# Patient Record
Sex: FEMALE | Race: WHITE | Employment: FULL TIME
[De-identification: names, ages, dates, MRNs, and addresses within clinical notes are randomized per-mention and may not be internally consistent; named-entity substitution may affect disease eponyms.]

---

## 2017-01-12 ENCOUNTER — TELEPHONE (OUTPATIENT)
Facility: CLINIC | Age: 47
End: 2017-01-12

## 2017-01-12 RX ORDER — AZITHROMYCIN 250 MG/1
TABLET, FILM COATED ORAL
Qty: 1 PACKET | Refills: 0 | Status: SHIPPED | OUTPATIENT
Start: 2017-01-12 | End: 2017-01-22

## 2017-02-10 DIAGNOSIS — F90.9 ATTENTION DEFICIT HYPERACTIVITY DISORDER (ADHD), UNSPECIFIED ADHD TYPE: Primary | ICD-10-CM

## 2017-02-10 DIAGNOSIS — M79.7 FIBROMYALGIA: ICD-10-CM

## 2017-02-10 RX ORDER — CYCLOBENZAPRINE HCL 5 MG
5 TABLET ORAL EVERY 8 HOURS PRN
Qty: 30 TABLET | Refills: 0 | Status: SHIPPED | OUTPATIENT
Start: 2017-02-10 | End: 2017-05-18 | Stop reason: SDUPTHER

## 2017-02-10 RX ORDER — DEXTROAMPHETAMINE SACCHARATE, AMPHETAMINE ASPARTATE, DEXTROAMPHETAMINE SULFATE AND AMPHETAMINE SULFATE 3.75; 3.75; 3.75; 3.75 MG/1; MG/1; MG/1; MG/1
15 TABLET ORAL 2 TIMES DAILY
Qty: 60 TABLET | Refills: 0 | Status: SHIPPED | OUTPATIENT
Start: 2017-02-10 | End: 2017-05-18 | Stop reason: SDUPTHER

## 2017-02-14 ENCOUNTER — OFFICE VISIT (OUTPATIENT)
Facility: CLINIC | Age: 47
End: 2017-02-14

## 2017-02-14 VITALS
DIASTOLIC BLOOD PRESSURE: 78 MMHG | HEART RATE: 80 BPM | OXYGEN SATURATION: 98 % | SYSTOLIC BLOOD PRESSURE: 120 MMHG | RESPIRATION RATE: 16 BRPM

## 2017-02-14 DIAGNOSIS — R42 LIGHT HEADED: Primary | ICD-10-CM

## 2017-02-14 DIAGNOSIS — M79.7 FIBROMYALGIA: ICD-10-CM

## 2017-02-14 DIAGNOSIS — E55.9 VITAMIN D DEFICIENCY: ICD-10-CM

## 2017-02-14 DIAGNOSIS — E78.00 PURE HYPERCHOLESTEROLEMIA: ICD-10-CM

## 2017-02-14 PROCEDURE — G8427 DOCREV CUR MEDS BY ELIG CLIN: HCPCS | Performed by: NURSE PRACTITIONER

## 2017-02-14 PROCEDURE — G8419 CALC BMI OUT NRM PARAM NOF/U: HCPCS | Performed by: NURSE PRACTITIONER

## 2017-02-14 PROCEDURE — 1036F TOBACCO NON-USER: CPT | Performed by: NURSE PRACTITIONER

## 2017-02-14 PROCEDURE — 99213 OFFICE O/P EST LOW 20 MIN: CPT | Performed by: NURSE PRACTITIONER

## 2017-02-14 PROCEDURE — G8484 FLU IMMUNIZE NO ADMIN: HCPCS | Performed by: NURSE PRACTITIONER

## 2017-02-14 RX ORDER — PREDNISONE 10 MG/1
TABLET ORAL
Qty: 12 TABLET | Refills: 0 | Status: SHIPPED | OUTPATIENT
Start: 2017-02-14 | End: 2017-08-22 | Stop reason: ALTCHOICE

## 2017-02-14 RX ORDER — SERTRALINE HYDROCHLORIDE 100 MG/1
TABLET, FILM COATED ORAL
Qty: 90 TABLET | Refills: 3 | Status: SHIPPED | OUTPATIENT
Start: 2017-02-14 | End: 2017-08-22

## 2017-02-15 ASSESSMENT — ENCOUNTER SYMPTOMS
ALLERGIC/IMMUNOLOGIC NEGATIVE: 1
GASTROINTESTINAL NEGATIVE: 1
EYES NEGATIVE: 1
RESPIRATORY NEGATIVE: 1

## 2017-02-18 ENCOUNTER — HOSPITAL ENCOUNTER (OUTPATIENT)
Age: 47
Discharge: HOME | End: 2017-02-18
Payer: COMMERCIAL

## 2017-02-18 DIAGNOSIS — R42 LIGHT HEADED: ICD-10-CM

## 2017-02-18 DIAGNOSIS — E55.9 VITAMIN D DEFICIENCY: ICD-10-CM

## 2017-02-18 DIAGNOSIS — E78.00 PURE HYPERCHOLESTEROLEMIA: ICD-10-CM

## 2017-02-18 LAB
ALBUMIN SERPL-MCNC: 4.4 G/DL (ref 3.5–5.2)
ALBUMIN/GLOBULIN RATIO: ABNORMAL (ref 1–2.5)
ALP BLD-CCNC: 51 U/L (ref 35–104)
ALT SERPL-CCNC: 10 U/L (ref 5–33)
ANION GAP SERPL CALCULATED.3IONS-SCNC: 12 MMOL/L (ref 9–17)
AST SERPL-CCNC: 16 U/L
BILIRUB SERPL-MCNC: 0.39 MG/DL (ref 0.3–1.2)
BUN BLDV-MCNC: 10 MG/DL (ref 6–20)
BUN/CREAT BLD: ABNORMAL (ref 9–20)
CALCIUM SERPL-MCNC: 9.2 MG/DL (ref 8.6–10.4)
CHLORIDE BLD-SCNC: 105 MMOL/L (ref 98–107)
CHOLESTEROL/HDL RATIO: 2.6
CHOLESTEROL: 182 MG/DL
CO2: 27 MMOL/L (ref 20–31)
CREAT SERPL-MCNC: 0.57 MG/DL (ref 0.5–0.9)
GFR AFRICAN AMERICAN: >60 ML/MIN
GFR NON-AFRICAN AMERICAN: >60 ML/MIN
GFR SERPL CREATININE-BSD FRML MDRD: ABNORMAL ML/MIN/{1.73_M2}
GFR SERPL CREATININE-BSD FRML MDRD: ABNORMAL ML/MIN/{1.73_M2}
GLUCOSE BLD-MCNC: 108 MG/DL (ref 70–99)
HCT VFR BLD CALC: 31.6 % (ref 36–46)
HDLC SERPL-MCNC: 70 MG/DL
HEMOGLOBIN: 9.5 G/DL (ref 12–16)
IRON: 21 UG/DL (ref 37–145)
LDL CHOLESTEROL: 99 MG/DL (ref 0–130)
MCH RBC QN AUTO: 21.2 PG (ref 26–34)
MCHC RBC AUTO-ENTMCNC: 30.2 G/DL (ref 31–37)
MCV RBC AUTO: 70.3 FL (ref 80–100)
PDW BLD-RTO: 17.6 % (ref 11.5–14.9)
PLATELET # BLD: 298 K/UL (ref 150–450)
PMV BLD AUTO: 8.4 FL (ref 6–12)
POTASSIUM SERPL-SCNC: 4.5 MMOL/L (ref 3.7–5.3)
RBC # BLD: 4.5 M/UL (ref 4–5.2)
SODIUM BLD-SCNC: 144 MMOL/L (ref 135–144)
THYROXINE, FREE: 1.19 NG/DL (ref 0.93–1.7)
TOTAL PROTEIN: 7.5 G/DL (ref 6.4–8.3)
TRIGL SERPL-MCNC: 66 MG/DL
TSH SERPL DL<=0.05 MIU/L-ACNC: 1.03 MIU/L (ref 0.3–5)
VITAMIN B-12: 539 PG/ML (ref 211–946)
VITAMIN D 25-HYDROXY: 34.4 NG/ML (ref 30–100)
VLDLC SERPL CALC-MCNC: NORMAL MG/DL (ref 1–30)
WBC # BLD: 4.5 K/UL (ref 3.5–11)

## 2017-02-18 PROCEDURE — 80053 COMPREHEN METABOLIC PANEL: CPT

## 2017-02-18 PROCEDURE — 82306 VITAMIN D 25 HYDROXY: CPT

## 2017-02-18 PROCEDURE — 82607 VITAMIN B-12: CPT

## 2017-02-18 PROCEDURE — 84443 ASSAY THYROID STIM HORMONE: CPT

## 2017-02-18 PROCEDURE — 80061 LIPID PANEL: CPT

## 2017-02-18 PROCEDURE — 84439 ASSAY OF FREE THYROXINE: CPT

## 2017-02-18 PROCEDURE — 83540 ASSAY OF IRON: CPT

## 2017-02-18 PROCEDURE — 85027 COMPLETE CBC AUTOMATED: CPT

## 2017-02-18 PROCEDURE — 36415 COLL VENOUS BLD VENIPUNCTURE: CPT

## 2017-12-26 PROBLEM — M25.551 RIGHT HIP PAIN: Status: ACTIVE | Noted: 2017-12-26

## 2017-12-26 PROBLEM — F33.41 RECURRENT MAJOR DEPRESSIVE DISORDER, IN PARTIAL REMISSION (HCC): Status: ACTIVE | Noted: 2017-12-26

## 2018-01-02 PROBLEM — M19.90 OSTEOARTHRITIS: Status: ACTIVE | Noted: 2018-01-02

## 2018-01-25 ENCOUNTER — HOSPITAL ENCOUNTER (OUTPATIENT)
Dept: WOMENS IMAGING | Age: 48
Discharge: HOME | End: 2018-01-25
Payer: COMMERCIAL

## 2018-01-25 DIAGNOSIS — Z12.39 SCREENING FOR BREAST CANCER: ICD-10-CM

## 2018-01-25 PROCEDURE — 77063 BREAST TOMOSYNTHESIS BI: CPT

## 2018-03-15 ENCOUNTER — HOSPITAL ENCOUNTER (OUTPATIENT)
Age: 48
Discharge: HOME | End: 2018-03-15
Payer: COMMERCIAL

## 2018-03-15 DIAGNOSIS — D50.9 IRON DEFICIENCY ANEMIA, UNSPECIFIED IRON DEFICIENCY ANEMIA TYPE: ICD-10-CM

## 2018-03-15 DIAGNOSIS — E78.00 PURE HYPERCHOLESTEROLEMIA: ICD-10-CM

## 2018-03-15 DIAGNOSIS — Z98.84 STATUS POST GASTRIC BYPASS FOR OBESITY: ICD-10-CM

## 2018-03-15 DIAGNOSIS — E55.9 VITAMIN D DEFICIENCY: ICD-10-CM

## 2018-03-15 LAB
ALBUMIN SERPL-MCNC: 4.3 G/DL (ref 3.5–5.2)
ALBUMIN/GLOBULIN RATIO: NORMAL (ref 1–2.5)
ALP BLD-CCNC: 63 U/L (ref 35–104)
ALT SERPL-CCNC: 13 U/L (ref 5–33)
ANION GAP SERPL CALCULATED.3IONS-SCNC: 11 MMOL/L (ref 9–17)
AST SERPL-CCNC: 20 U/L
BILIRUB SERPL-MCNC: 0.63 MG/DL (ref 0.3–1.2)
BUN BLDV-MCNC: 11 MG/DL (ref 6–20)
BUN/CREAT BLD: NORMAL (ref 9–20)
CALCIUM SERPL-MCNC: 9.4 MG/DL (ref 8.6–10.4)
CHLORIDE BLD-SCNC: 100 MMOL/L (ref 98–107)
CHOLESTEROL/HDL RATIO: 2.7
CHOLESTEROL: 196 MG/DL
CO2: 28 MMOL/L (ref 20–31)
CREAT SERPL-MCNC: 0.64 MG/DL (ref 0.5–0.9)
GFR AFRICAN AMERICAN: >60 ML/MIN
GFR NON-AFRICAN AMERICAN: >60 ML/MIN
GFR SERPL CREATININE-BSD FRML MDRD: NORMAL ML/MIN/{1.73_M2}
GFR SERPL CREATININE-BSD FRML MDRD: NORMAL ML/MIN/{1.73_M2}
GLUCOSE BLD-MCNC: 96 MG/DL (ref 70–99)
HCT VFR BLD CALC: 38.6 % (ref 36–46)
HDLC SERPL-MCNC: 73 MG/DL
HEMOGLOBIN: 12.6 G/DL (ref 12–16)
IRON: 128 UG/DL (ref 37–145)
LDL CHOLESTEROL: 99 MG/DL (ref 0–130)
MCH RBC QN AUTO: 27.9 PG (ref 26–34)
MCHC RBC AUTO-ENTMCNC: 32.7 G/DL (ref 31–37)
MCV RBC AUTO: 85.5 FL (ref 80–100)
NRBC AUTOMATED: ABNORMAL PER 100 WBC
PDW BLD-RTO: 15.6 % (ref 11.5–14.9)
PLATELET # BLD: 243 K/UL (ref 150–450)
PMV BLD AUTO: 8.9 FL (ref 6–12)
POTASSIUM SERPL-SCNC: 4 MMOL/L (ref 3.7–5.3)
RBC # BLD: 4.51 M/UL (ref 4–5.2)
SODIUM BLD-SCNC: 139 MMOL/L (ref 135–144)
THYROXINE, FREE: 1.35 NG/DL (ref 0.93–1.7)
TOTAL PROTEIN: 7.5 G/DL (ref 6.4–8.3)
TRIGL SERPL-MCNC: 122 MG/DL
TSH SERPL DL<=0.05 MIU/L-ACNC: 4.74 MIU/L (ref 0.3–5)
VITAMIN B-12: 541 PG/ML (ref 232–1245)
VITAMIN D 25-HYDROXY: 35.7 NG/ML (ref 30–100)
VLDLC SERPL CALC-MCNC: NORMAL MG/DL (ref 1–30)
WBC # BLD: 5.1 K/UL (ref 3.5–11)

## 2018-03-15 PROCEDURE — 36415 COLL VENOUS BLD VENIPUNCTURE: CPT

## 2018-03-15 PROCEDURE — 82306 VITAMIN D 25 HYDROXY: CPT

## 2018-03-15 PROCEDURE — 82607 VITAMIN B-12: CPT

## 2018-03-15 PROCEDURE — 83540 ASSAY OF IRON: CPT

## 2018-03-15 PROCEDURE — 84439 ASSAY OF FREE THYROXINE: CPT

## 2018-03-15 PROCEDURE — 80061 LIPID PANEL: CPT

## 2018-03-15 PROCEDURE — 85027 COMPLETE CBC AUTOMATED: CPT

## 2018-03-15 PROCEDURE — 84443 ASSAY THYROID STIM HORMONE: CPT

## 2018-03-15 PROCEDURE — 80053 COMPREHEN METABOLIC PANEL: CPT

## 2018-03-28 ENCOUNTER — HOSPITAL ENCOUNTER (OUTPATIENT)
Dept: CT IMAGING | Age: 48
Discharge: HOME | End: 2018-03-30
Payer: COMMERCIAL

## 2018-03-28 DIAGNOSIS — R10.32 ABDOMINAL PAIN, LEFT LOWER QUADRANT: ICD-10-CM

## 2018-03-28 DIAGNOSIS — R10.32 LLQ ABDOMINAL PAIN: ICD-10-CM

## 2018-03-28 PROCEDURE — 74177 CT ABD & PELVIS W/CONTRAST: CPT

## 2018-03-28 PROCEDURE — 2580000003 HC RX 258: Performed by: NURSE PRACTITIONER

## 2018-03-28 PROCEDURE — 6360000004 HC RX CONTRAST MEDICATION: Performed by: NURSE PRACTITIONER

## 2018-03-28 RX ORDER — 0.9 % SODIUM CHLORIDE 0.9 %
100 INTRAVENOUS SOLUTION INTRAVENOUS ONCE
Status: COMPLETED | OUTPATIENT
Start: 2018-03-28 | End: 2018-03-28

## 2018-03-28 RX ORDER — SODIUM CHLORIDE 0.9 % (FLUSH) 0.9 %
10 SYRINGE (ML) INJECTION PRN
Status: DISCONTINUED | OUTPATIENT
Start: 2018-03-28 | End: 2018-03-31 | Stop reason: HOSPADM

## 2018-03-28 RX ADMIN — IOPAMIDOL 100 ML: 755 INJECTION, SOLUTION INTRAVENOUS at 07:30

## 2018-03-28 RX ADMIN — SODIUM CHLORIDE 100 ML: 9 INJECTION, SOLUTION INTRAVENOUS at 07:30

## 2018-03-28 RX ADMIN — Medication 10 ML: at 07:30

## 2018-05-31 ENCOUNTER — HOSPITAL ENCOUNTER (OUTPATIENT)
Dept: CT IMAGING | Age: 48
Discharge: HOME | End: 2018-06-02
Payer: COMMERCIAL

## 2018-05-31 DIAGNOSIS — J32.0 CHRONIC MAXILLARY SINUSITIS: ICD-10-CM

## 2018-05-31 PROCEDURE — 70486 CT MAXILLOFACIAL W/O DYE: CPT

## 2018-11-02 PROBLEM — R07.2 PRECORDIAL PAIN: Status: ACTIVE | Noted: 2018-11-02

## 2018-11-02 PROBLEM — F51.04 PSYCHOPHYSIOLOGICAL INSOMNIA: Status: ACTIVE | Noted: 2018-11-02

## 2018-11-02 PROBLEM — N39.41 URGE INCONTINENCE OF URINE: Status: ACTIVE | Noted: 2018-11-02

## 2018-11-02 PROBLEM — N95.1 PERIMENOPAUSAL SYMPTOMS: Status: ACTIVE | Noted: 2018-11-02

## 2018-11-16 PROBLEM — Z78.9 ALCOHOL USE: Status: ACTIVE | Noted: 2018-11-16

## 2018-11-19 ENCOUNTER — OFFICE VISIT (OUTPATIENT)
Dept: BEHAVIORAL/MENTAL HEALTH CLINIC | Age: 48
End: 2018-11-19
Payer: COMMERCIAL

## 2018-11-19 DIAGNOSIS — F33.0 MAJOR DEPRESSIVE DISORDER, RECURRENT EPISODE, MILD (HCC): Primary | ICD-10-CM

## 2018-11-19 PROCEDURE — 90791 PSYCH DIAGNOSTIC EVALUATION: CPT | Performed by: SOCIAL WORKER

## 2019-01-02 PROBLEM — R56.9 SEIZURE (HCC): Status: ACTIVE | Noted: 2019-01-02

## 2019-01-02 PROBLEM — M54.31 SCIATICA OF RIGHT SIDE: Status: ACTIVE | Noted: 2019-01-02

## 2019-01-21 ENCOUNTER — HOSPITAL ENCOUNTER (OUTPATIENT)
Age: 49
Discharge: HOME | End: 2019-01-21
Payer: COMMERCIAL

## 2019-01-21 DIAGNOSIS — R53.83 OTHER FATIGUE: ICD-10-CM

## 2019-01-21 DIAGNOSIS — N95.1 PERIMENOPAUSAL SYMPTOMS: ICD-10-CM

## 2019-01-21 LAB
ANION GAP SERPL CALCULATED.3IONS-SCNC: 10 MMOL/L (ref 9–17)
BUN BLDV-MCNC: 13 MG/DL (ref 6–20)
BUN/CREAT BLD: NORMAL (ref 9–20)
CALCIUM SERPL-MCNC: 8.9 MG/DL (ref 8.6–10.4)
CHLORIDE BLD-SCNC: 105 MMOL/L (ref 98–107)
CO2: 26 MMOL/L (ref 20–31)
CREAT SERPL-MCNC: 0.5 MG/DL (ref 0.5–0.9)
ESTIMATED AVERAGE GLUCOSE: 103 MG/DL
ESTRADIOL LEVEL: 98 PG/ML (ref 27–314)
FOLLICLE STIMULATING HORMONE: 21.5 U/L (ref 1.7–21.5)
GFR AFRICAN AMERICAN: >60 ML/MIN
GFR NON-AFRICAN AMERICAN: >60 ML/MIN
GFR SERPL CREATININE-BSD FRML MDRD: NORMAL ML/MIN/{1.73_M2}
GFR SERPL CREATININE-BSD FRML MDRD: NORMAL ML/MIN/{1.73_M2}
GLUCOSE BLD-MCNC: 97 MG/DL (ref 70–99)
HBA1C MFR BLD: 5.2 % (ref 4–6)
HCT VFR BLD CALC: 37.2 % (ref 36–46)
HEMOGLOBIN: 12.4 G/DL (ref 12–16)
IRON: 31 UG/DL (ref 37–145)
LH: 21 U/L (ref 1–95.6)
MCH RBC QN AUTO: 29 PG (ref 26–34)
MCHC RBC AUTO-ENTMCNC: 33.3 G/DL (ref 31–37)
MCV RBC AUTO: 86.8 FL (ref 80–100)
MYOGLOBIN: <21 NG/ML (ref 25–58)
NRBC AUTOMATED: NORMAL PER 100 WBC
PDW BLD-RTO: 14.5 % (ref 11.5–14.9)
PLATELET # BLD: 241 K/UL (ref 150–450)
PMV BLD AUTO: 8.3 FL (ref 6–12)
POTASSIUM SERPL-SCNC: 4.4 MMOL/L (ref 3.7–5.3)
PROGESTERONE LEVEL: 1.72 NG/ML
RBC # BLD: 4.28 M/UL (ref 4–5.2)
SEX HORMONE BINDING GLOBULIN: 106 NMOL/L (ref 30–135)
SODIUM BLD-SCNC: 141 MMOL/L (ref 135–144)
TESTOSTERONE FREE-NONMALE: 1.6 PG/ML (ref 1.1–5.8)
TESTOSTERONE TOTAL: 20 NG/DL (ref 20–70)
THYROXINE, FREE: 0.94 NG/DL (ref 0.93–1.7)
TOTAL CK: 99 U/L (ref 26–192)
TSH SERPL DL<=0.05 MIU/L-ACNC: 1.96 MIU/L (ref 0.3–5)
VITAMIN B-12: 313 PG/ML (ref 232–1245)
VITAMIN D 25-HYDROXY: 26.8 NG/ML (ref 30–100)
WBC # BLD: 5.2 K/UL (ref 3.5–11)

## 2019-01-21 PROCEDURE — 84439 ASSAY OF FREE THYROXINE: CPT

## 2019-01-21 PROCEDURE — 83036 HEMOGLOBIN GLYCOSYLATED A1C: CPT

## 2019-01-21 PROCEDURE — 83001 ASSAY OF GONADOTROPIN (FSH): CPT

## 2019-01-21 PROCEDURE — 82306 VITAMIN D 25 HYDROXY: CPT

## 2019-01-21 PROCEDURE — 82607 VITAMIN B-12: CPT

## 2019-01-21 PROCEDURE — 84144 ASSAY OF PROGESTERONE: CPT

## 2019-01-21 PROCEDURE — 36415 COLL VENOUS BLD VENIPUNCTURE: CPT

## 2019-01-21 PROCEDURE — 83874 ASSAY OF MYOGLOBIN: CPT

## 2019-01-21 PROCEDURE — 84403 ASSAY OF TOTAL TESTOSTERONE: CPT

## 2019-01-21 PROCEDURE — 80048 BASIC METABOLIC PNL TOTAL CA: CPT

## 2019-01-21 PROCEDURE — 82670 ASSAY OF TOTAL ESTRADIOL: CPT

## 2019-01-21 PROCEDURE — 83002 ASSAY OF GONADOTROPIN (LH): CPT

## 2019-01-21 PROCEDURE — 84443 ASSAY THYROID STIM HORMONE: CPT

## 2019-01-21 PROCEDURE — 84270 ASSAY OF SEX HORMONE GLOBUL: CPT

## 2019-01-21 PROCEDURE — 85027 COMPLETE CBC AUTOMATED: CPT

## 2019-01-21 PROCEDURE — 83540 ASSAY OF IRON: CPT

## 2019-01-21 PROCEDURE — 82550 ASSAY OF CK (CPK): CPT

## 2019-02-11 ENCOUNTER — HOSPITAL ENCOUNTER (OUTPATIENT)
Dept: NON INVASIVE DIAGNOSTICS | Age: 49
Discharge: HOME | End: 2019-02-11
Payer: COMMERCIAL

## 2019-02-11 ENCOUNTER — HOSPITAL ENCOUNTER (OUTPATIENT)
Dept: NUCLEAR MEDICINE | Age: 49
Discharge: HOME | End: 2019-02-13
Payer: COMMERCIAL

## 2019-02-11 VITALS — HEIGHT: 67 IN | BODY MASS INDEX: 36.1 KG/M2 | WEIGHT: 230 LBS

## 2019-02-11 DIAGNOSIS — R07.9 CHEST PAIN, UNSPECIFIED TYPE: ICD-10-CM

## 2019-02-11 DIAGNOSIS — R07.2 PRECORDIAL PAIN: ICD-10-CM

## 2019-02-11 LAB
LV EF: 53 %
LVEF MODALITY: NORMAL

## 2019-02-11 PROCEDURE — 78452 HT MUSCLE IMAGE SPECT MULT: CPT

## 2019-02-11 PROCEDURE — 2580000003 HC RX 258: Performed by: NURSE PRACTITIONER

## 2019-02-11 PROCEDURE — 93017 CV STRESS TEST TRACING ONLY: CPT

## 2019-02-11 PROCEDURE — 6360000002 HC RX W HCPCS: Performed by: NURSE PRACTITIONER

## 2019-02-11 PROCEDURE — 3430000000 HC RX DIAGNOSTIC RADIOPHARMACEUTICAL: Performed by: NURSE PRACTITIONER

## 2019-02-11 PROCEDURE — A9500 TC99M SESTAMIBI: HCPCS | Performed by: NURSE PRACTITIONER

## 2019-02-11 RX ORDER — NITROGLYCERIN 0.4 MG/1
0.4 TABLET SUBLINGUAL EVERY 5 MIN PRN
Status: ACTIVE | OUTPATIENT
Start: 2019-02-11 | End: 2019-02-12

## 2019-02-11 RX ORDER — SODIUM CHLORIDE 0.9 % (FLUSH) 0.9 %
10 SYRINGE (ML) INJECTION PRN
Status: DISCONTINUED | OUTPATIENT
Start: 2019-02-11 | End: 2019-02-14 | Stop reason: HOSPADM

## 2019-02-11 RX ORDER — METOPROLOL TARTRATE 5 MG/5ML
2.5 INJECTION INTRAVENOUS PRN
Status: ACTIVE | OUTPATIENT
Start: 2019-02-11 | End: 2019-02-12

## 2019-02-11 RX ORDER — 0.9 % SODIUM CHLORIDE 0.9 %
250 INTRAVENOUS SOLUTION INTRAVENOUS ONCE
Status: DISCONTINUED | OUTPATIENT
Start: 2019-02-11 | End: 2019-02-14 | Stop reason: HOSPADM

## 2019-02-11 RX ORDER — SODIUM CHLORIDE 0.9 % (FLUSH) 0.9 %
10 SYRINGE (ML) INJECTION PRN
Status: ACTIVE | OUTPATIENT
Start: 2019-02-11 | End: 2019-02-12

## 2019-02-11 RX ORDER — AMINOPHYLLINE DIHYDRATE 25 MG/ML
100 INJECTION, SOLUTION INTRAVENOUS
Status: ACTIVE | OUTPATIENT
Start: 2019-02-11 | End: 2019-02-11

## 2019-02-11 RX ADMIN — Medication 10 ML: at 08:52

## 2019-02-11 RX ADMIN — REGADENOSON 0.4 MG: 0.08 INJECTION, SOLUTION INTRAVENOUS at 09:22

## 2019-02-11 RX ADMIN — TETRAKIS(2-METHOXYISOBUTYLISOCYANIDE)COPPER(I) TETRAFLUOROBORATE 35.3 MILLICURIE: 1 INJECTION, POWDER, LYOPHILIZED, FOR SOLUTION INTRAVENOUS at 09:24

## 2019-02-11 RX ADMIN — Medication 10 ML: at 06:56

## 2019-02-11 RX ADMIN — TETRAKIS(2-METHOXYISOBUTYLISOCYANIDE)COPPER(I) TETRAFLUOROBORATE 11.5 MILLICURIE: 1 INJECTION, POWDER, LYOPHILIZED, FOR SOLUTION INTRAVENOUS at 06:55

## 2019-05-01 ENCOUNTER — HOSPITAL ENCOUNTER (OUTPATIENT)
Age: 49
Discharge: HOME | End: 2019-05-01
Payer: COMMERCIAL

## 2019-05-01 DIAGNOSIS — D50.9 IRON DEFICIENCY ANEMIA, UNSPECIFIED IRON DEFICIENCY ANEMIA TYPE: ICD-10-CM

## 2019-05-01 DIAGNOSIS — E55.9 VITAMIN D DEFICIENCY: ICD-10-CM

## 2019-05-01 LAB
IRON: 84 UG/DL (ref 37–145)
VITAMIN D 25-HYDROXY: 35.8 NG/ML (ref 30–100)

## 2019-05-01 PROCEDURE — 83540 ASSAY OF IRON: CPT

## 2019-05-01 PROCEDURE — 82306 VITAMIN D 25 HYDROXY: CPT

## 2019-05-01 PROCEDURE — 36415 COLL VENOUS BLD VENIPUNCTURE: CPT

## 2019-05-18 ENCOUNTER — HOSPITAL ENCOUNTER (OUTPATIENT)
Age: 49
Discharge: HOME | End: 2019-05-18
Payer: COMMERCIAL

## 2019-05-18 DIAGNOSIS — T14.8XXA BRUISING: ICD-10-CM

## 2019-05-18 DIAGNOSIS — Z80.8 FAMILY HISTORY OF THYROID CANCER: ICD-10-CM

## 2019-05-18 DIAGNOSIS — R56.9 SEIZURE (HCC): ICD-10-CM

## 2019-05-18 LAB
HCT VFR BLD CALC: 38.2 % (ref 36–46)
HEMOGLOBIN: 12.6 G/DL (ref 12–16)
KEPPRA: 32 UG/ML
MCH RBC QN AUTO: 28.7 PG (ref 26–34)
MCHC RBC AUTO-ENTMCNC: 32.9 G/DL (ref 31–37)
MCV RBC AUTO: 87.2 FL (ref 80–100)
NRBC AUTOMATED: ABNORMAL PER 100 WBC
PDW BLD-RTO: 15.9 % (ref 11.5–14.9)
PLATELET # BLD: 271 K/UL (ref 150–450)
PMV BLD AUTO: 8.3 FL (ref 6–12)
RBC # BLD: 4.38 M/UL (ref 4–5.2)
THYROXINE, FREE: 1.46 NG/DL (ref 0.93–1.7)
TSH SERPL DL<=0.05 MIU/L-ACNC: 2.13 MIU/L (ref 0.3–5)
WBC # BLD: 4.5 K/UL (ref 3.5–11)

## 2019-05-18 PROCEDURE — 84439 ASSAY OF FREE THYROXINE: CPT

## 2019-05-18 PROCEDURE — 36415 COLL VENOUS BLD VENIPUNCTURE: CPT

## 2019-05-18 PROCEDURE — 85027 COMPLETE CBC AUTOMATED: CPT

## 2019-05-18 PROCEDURE — 80177 DRUG SCRN QUAN LEVETIRACETAM: CPT

## 2019-05-18 PROCEDURE — 84597 ASSAY OF VITAMIN K: CPT

## 2019-05-18 PROCEDURE — 84443 ASSAY THYROID STIM HORMONE: CPT

## 2019-05-21 LAB — VITAMIN K: 1.28 NMOL/L (ref 0.22–4.88)

## 2019-11-05 ENCOUNTER — HOSPITAL ENCOUNTER (OUTPATIENT)
Age: 49
Discharge: HOME | End: 2019-11-05
Payer: COMMERCIAL

## 2019-11-05 LAB
C DIFFICILE TOXINS, PCR: NORMAL
C-REACTIVE PROTEIN: 1.2 MG/L (ref 0–5)
CAMPYLOBACTER PCR: NORMAL
E COLI ENTEROTOXIGENIC PCR: NORMAL
IGA: 333 MG/DL (ref 70–400)
PLESIOMONAS SHIGELLOIDES PCR: NORMAL
SALMONELLA PCR: NORMAL
SHIGATOXIN GENE PCR: NORMAL
SHIGELLA SP PCR: NORMAL
SPECIMEN DESCRIPTION: NORMAL
SPECIMEN DESCRIPTION: NORMAL
VIBRIO PCR: NORMAL
YERSINIA ENTEROCOLITICA PCR: NORMAL

## 2019-11-05 PROCEDURE — 83516 IMMUNOASSAY NONANTIBODY: CPT

## 2019-11-05 PROCEDURE — 87329 GIARDIA AG IA: CPT

## 2019-11-05 PROCEDURE — 82784 ASSAY IGA/IGD/IGG/IGM EACH: CPT

## 2019-11-05 PROCEDURE — 83993 ASSAY FOR CALPROTECTIN FECAL: CPT

## 2019-11-05 PROCEDURE — 87493 C DIFF AMPLIFIED PROBE: CPT

## 2019-11-05 PROCEDURE — 36415 COLL VENOUS BLD VENIPUNCTURE: CPT

## 2019-11-05 PROCEDURE — 87506 IADNA-DNA/RNA PROBE TQ 6-11: CPT

## 2019-11-05 PROCEDURE — 86140 C-REACTIVE PROTEIN: CPT

## 2019-11-05 PROCEDURE — 87328 CRYPTOSPORIDIUM AG IA: CPT

## 2019-11-06 LAB
DIRECT EXAM: NORMAL
DIRECT EXAM: NORMAL
GLIADIN DEAMINIDATED PEPTIDE AB IGG: <0.4 U/ML
Lab: NORMAL
SPECIMEN DESCRIPTION: NORMAL
TISSUE TRANSGLUTAMINASE IGA: 0.5 U/ML

## 2019-11-08 LAB — CALPROTECTIN, FECAL: 79 UG/G

## 2020-05-20 ENCOUNTER — HOSPITAL ENCOUNTER (OUTPATIENT)
Dept: WOMENS IMAGING | Age: 50
Discharge: HOME | End: 2020-05-22
Payer: COMMERCIAL

## 2020-05-20 ENCOUNTER — HOSPITAL ENCOUNTER (OUTPATIENT)
Age: 50
Discharge: HOME | End: 2020-05-20
Payer: COMMERCIAL

## 2020-05-20 LAB
ALBUMIN SERPL-MCNC: 4.5 G/DL (ref 3.5–5.2)
ALBUMIN/GLOBULIN RATIO: ABNORMAL (ref 1–2.5)
ALP BLD-CCNC: 83 U/L (ref 35–104)
ALT SERPL-CCNC: 53 U/L (ref 5–33)
ANION GAP SERPL CALCULATED.3IONS-SCNC: 17 MMOL/L (ref 9–17)
AST SERPL-CCNC: 30 U/L
BILIRUB SERPL-MCNC: 0.67 MG/DL (ref 0.3–1.2)
BUN BLDV-MCNC: 12 MG/DL (ref 6–20)
BUN/CREAT BLD: ABNORMAL (ref 9–20)
CALCIUM SERPL-MCNC: 9.6 MG/DL (ref 8.6–10.4)
CHLORIDE BLD-SCNC: 99 MMOL/L (ref 98–107)
CHOLESTEROL/HDL RATIO: 2.2
CHOLESTEROL: 243 MG/DL
CO2: 24 MMOL/L (ref 20–31)
CREAT SERPL-MCNC: 0.65 MG/DL (ref 0.5–0.9)
ESTIMATED AVERAGE GLUCOSE: 100 MG/DL
GFR AFRICAN AMERICAN: >60 ML/MIN
GFR NON-AFRICAN AMERICAN: >60 ML/MIN
GFR SERPL CREATININE-BSD FRML MDRD: ABNORMAL ML/MIN/{1.73_M2}
GFR SERPL CREATININE-BSD FRML MDRD: ABNORMAL ML/MIN/{1.73_M2}
GLUCOSE BLD-MCNC: 102 MG/DL (ref 70–99)
HBA1C MFR BLD: 5.1 % (ref 4–6)
HCT VFR BLD CALC: 40.1 % (ref 36–46)
HDLC SERPL-MCNC: 111 MG/DL
HEMOGLOBIN: 13.2 G/DL (ref 12–16)
IRON: 95 UG/DL (ref 37–145)
LDL CHOLESTEROL: 109 MG/DL (ref 0–130)
MCH RBC QN AUTO: 29.7 PG (ref 26–34)
MCHC RBC AUTO-ENTMCNC: 33 G/DL (ref 31–37)
MCV RBC AUTO: 89.9 FL (ref 80–100)
NRBC AUTOMATED: ABNORMAL PER 100 WBC
PDW BLD-RTO: 16.7 % (ref 11.5–14.9)
PLATELET # BLD: 234 K/UL (ref 150–450)
PMV BLD AUTO: 8 FL (ref 6–12)
POTASSIUM SERPL-SCNC: 4.2 MMOL/L (ref 3.7–5.3)
RBC # BLD: 4.45 M/UL (ref 4–5.2)
SODIUM BLD-SCNC: 140 MMOL/L (ref 135–144)
TOTAL PROTEIN: 7.6 G/DL (ref 6.4–8.3)
TRIGL SERPL-MCNC: 114 MG/DL
VITAMIN B-12: 590 PG/ML (ref 232–1245)
VITAMIN D 25-HYDROXY: 37.7 NG/ML (ref 30–100)
VLDLC SERPL CALC-MCNC: ABNORMAL MG/DL (ref 1–30)
WBC # BLD: 4.2 K/UL (ref 3.5–11)

## 2020-05-20 PROCEDURE — 82607 VITAMIN B-12: CPT

## 2020-05-20 PROCEDURE — 36415 COLL VENOUS BLD VENIPUNCTURE: CPT

## 2020-05-20 PROCEDURE — 80053 COMPREHEN METABOLIC PANEL: CPT

## 2020-05-20 PROCEDURE — 82306 VITAMIN D 25 HYDROXY: CPT

## 2020-05-20 PROCEDURE — 83540 ASSAY OF IRON: CPT

## 2020-05-20 PROCEDURE — 85027 COMPLETE CBC AUTOMATED: CPT

## 2020-05-20 PROCEDURE — 80061 LIPID PANEL: CPT

## 2020-05-20 PROCEDURE — 77067 SCR MAMMO BI INCL CAD: CPT

## 2020-05-20 PROCEDURE — 83036 HEMOGLOBIN GLYCOSYLATED A1C: CPT

## 2021-04-22 ENCOUNTER — HOSPITAL ENCOUNTER (OUTPATIENT)
Age: 51
Discharge: HOME | End: 2021-04-22
Payer: COMMERCIAL

## 2021-04-22 DIAGNOSIS — E78.00 PURE HYPERCHOLESTEROLEMIA: ICD-10-CM

## 2021-04-22 DIAGNOSIS — E55.9 VITAMIN D DEFICIENCY: ICD-10-CM

## 2021-04-22 DIAGNOSIS — E53.8 VITAMIN B 12 DEFICIENCY: ICD-10-CM

## 2021-04-22 DIAGNOSIS — Z13.9 SCREENING FOR CONDITION: ICD-10-CM

## 2021-04-22 LAB
ALBUMIN SERPL-MCNC: 4.3 G/DL (ref 3.5–5.2)
ALBUMIN/GLOBULIN RATIO: ABNORMAL (ref 1–2.5)
ALP BLD-CCNC: 67 U/L (ref 35–104)
ALT SERPL-CCNC: 32 U/L (ref 5–33)
ANION GAP SERPL CALCULATED.3IONS-SCNC: 8 MMOL/L (ref 9–17)
AST SERPL-CCNC: 27 U/L
BILIRUB SERPL-MCNC: 0.2 MG/DL (ref 0.3–1.2)
BUN BLDV-MCNC: 11 MG/DL (ref 6–20)
BUN/CREAT BLD: ABNORMAL (ref 9–20)
CALCIUM SERPL-MCNC: 9.4 MG/DL (ref 8.6–10.4)
CHLORIDE BLD-SCNC: 108 MMOL/L (ref 98–107)
CHOLESTEROL/HDL RATIO: 3.8
CHOLESTEROL: 220 MG/DL
CO2: 30 MMOL/L (ref 20–31)
CREAT SERPL-MCNC: 0.67 MG/DL (ref 0.5–0.9)
ESTIMATED AVERAGE GLUCOSE: 117 MG/DL
GFR AFRICAN AMERICAN: >60 ML/MIN
GFR NON-AFRICAN AMERICAN: >60 ML/MIN
GFR SERPL CREATININE-BSD FRML MDRD: ABNORMAL ML/MIN/{1.73_M2}
GFR SERPL CREATININE-BSD FRML MDRD: ABNORMAL ML/MIN/{1.73_M2}
GLUCOSE BLD-MCNC: 122 MG/DL (ref 70–99)
HBA1C MFR BLD: 5.7 % (ref 4–6)
HCT VFR BLD CALC: 38 % (ref 36–46)
HDLC SERPL-MCNC: 58 MG/DL
HEMOGLOBIN: 12.3 G/DL (ref 12–16)
LDL CHOLESTEROL: 137 MG/DL (ref 0–130)
MCH RBC QN AUTO: 27 PG (ref 26–34)
MCHC RBC AUTO-ENTMCNC: 32.4 G/DL (ref 31–37)
MCV RBC AUTO: 83.3 FL (ref 80–100)
NRBC AUTOMATED: NORMAL PER 100 WBC
PDW BLD-RTO: 14 % (ref 11.5–14.9)
PLATELET # BLD: 266 K/UL (ref 150–450)
PMV BLD AUTO: 8.2 FL (ref 6–12)
POTASSIUM SERPL-SCNC: 4.4 MMOL/L (ref 3.7–5.3)
RBC # BLD: 4.56 M/UL (ref 4–5.2)
SODIUM BLD-SCNC: 146 MMOL/L (ref 135–144)
TOTAL PROTEIN: 7.3 G/DL (ref 6.4–8.3)
TRIGL SERPL-MCNC: 123 MG/DL
VITAMIN B-12: 405 PG/ML (ref 232–1245)
VITAMIN D 25-HYDROXY: 32.9 NG/ML (ref 30–100)
VLDLC SERPL CALC-MCNC: ABNORMAL MG/DL (ref 1–30)
WBC # BLD: 6.3 K/UL (ref 3.5–11)

## 2021-04-22 PROCEDURE — 83036 HEMOGLOBIN GLYCOSYLATED A1C: CPT

## 2021-04-22 PROCEDURE — 82306 VITAMIN D 25 HYDROXY: CPT

## 2021-04-22 PROCEDURE — 82607 VITAMIN B-12: CPT

## 2021-04-22 PROCEDURE — 85027 COMPLETE CBC AUTOMATED: CPT

## 2021-04-22 PROCEDURE — 80053 COMPREHEN METABOLIC PANEL: CPT

## 2021-04-22 PROCEDURE — 80061 LIPID PANEL: CPT

## 2021-04-22 PROCEDURE — 36415 COLL VENOUS BLD VENIPUNCTURE: CPT

## 2021-06-28 ENCOUNTER — HOSPITAL ENCOUNTER (OUTPATIENT)
Facility: CLINIC | Age: 51
Discharge: HOME | End: 2021-06-30
Payer: COMMERCIAL

## 2021-06-28 ENCOUNTER — HOSPITAL ENCOUNTER (OUTPATIENT)
Dept: GENERAL RADIOLOGY | Facility: CLINIC | Age: 51
Discharge: HOME | End: 2021-06-30
Payer: COMMERCIAL

## 2021-06-28 DIAGNOSIS — M54.2 CHRONIC NECK PAIN: ICD-10-CM

## 2021-06-28 DIAGNOSIS — G89.29 CHRONIC NECK PAIN: ICD-10-CM

## 2021-06-28 PROCEDURE — 72050 X-RAY EXAM NECK SPINE 4/5VWS: CPT

## 2021-09-27 ENCOUNTER — NURSE TRIAGE (OUTPATIENT)
Dept: OTHER | Facility: CLINIC | Age: 51
End: 2021-09-27

## 2021-09-27 NOTE — TELEPHONE ENCOUNTER
This is not a new problem. If she is not suicidal or thinking of hurting herself she does not need to be seen today. She could however do a virtual visit today. Go ahead and add her to the schedule that she wants to be seen to the virtual schedule.

## 2021-09-27 NOTE — TELEPHONE ENCOUNTER
Received call from Cj Valenzuela at [facility] with Red Flag Complaint. Brief description of triage: anxiety and depression    Triage indicates for patient to be seen today. Care advice provided, patient verbalizes understanding; denies any other questions or concerns; instructed to call back for any new or worsening symptoms. Writer provided warm transfer to Mony at [facility] for appointment scheduling. Attention Provider: Thank you for allowing me to participate in the care of your patient. The patient was connected to triage in response to information provided to the ECC/PSC. Please do not respond through this encounter as the response is not directed to a shared pool. Reason for Disposition   Sometimes has thoughts of suicide    Answer Assessment - Initial Assessment Questions  1. CONCERN: \"What happened that made you call today? \"      Missing work    2. DEPRESSION SYMPTOM SCREENING: \"How are you feeling overall? \" (e.g., decreased energy, increased sleeping or difficulty sleeping, difficulty concentrating, feelings of sadness, guilt, hopelessness, or worthlessness)      Anxious    3. RISK OF HARM - SUICIDAL IDEATION:  \"Do you ever have thoughts of hurting or killing yourself? \"  (e.g., yes, no, no but preoccupation with thoughts about death)    - INTENT:  \"Do you have thoughts of hurting or killing yourself right NOW? \" (e.g., yes, no, N/A)    - PLAN: \"Do you have a specific plan for how you would do this? \" (e.g., gun, knife, overdose, no plan, N/A)     No, no    4. RISK OF HARM - HOMICIDAL IDEATION:  \"Do you ever have thoughts of hurting or killing someone else? \"  (e.g., yes, no, no but preoccupation with thoughts about death)    - INTENT:  \"Do you have thoughts of hurting or killing someone right NOW? \" (e.g., yes, no, N/A)    - PLAN: \"Do you have a specific plan for how you would do this? \" (e.g., gun, knife, no plan, N/A)     No    5.  FUNCTIONAL IMPAIRMENT: \"How have things been going for you overall in your life? Have you had any more difficulties than usual doing your normal daily activities? \"  (e.g., better, same, worse; self-care, school, work, interactions)   states life not good, work,  yes    6. SUPPORT: \"Who is with you now? \" \"Who do you live with?\" \"Do you have family or friends nearby who you can talk to?\"   Son is home, lives with  and son, yes      7. THERAPIST: \"Do you have a counselor or therapist? Name? \"  No    8. STRESSORS: \"Has there been any new stress or recent changes in your life? \"    Change in work schedule    9. DRUG ABUSE/ALCOHOL: \"Do you drink alcohol or use any illegal drugs? \"    drinks alcohol    10. OTHER: \"Do you have any other health or medical symptoms right now? \" (e.g., fever)      Headache, nausea, diarrhea    11. PREGNANCY: \"Is there any chance you are pregnant? \" \"When was your last menstrual period? \"      No, no cycle    Protocols used: DEPRESSION-ADULT-OH

## 2021-10-23 ENCOUNTER — HOSPITAL ENCOUNTER (OUTPATIENT)
Age: 51
Discharge: HOME | End: 2021-10-23
Payer: COMMERCIAL

## 2021-10-23 PROCEDURE — 86225 DNA ANTIBODY NATIVE: CPT

## 2021-10-23 PROCEDURE — 86038 ANTINUCLEAR ANTIBODIES: CPT

## 2021-10-23 PROCEDURE — 36415 COLL VENOUS BLD VENIPUNCTURE: CPT

## 2021-10-25 LAB
ANTI DNA DOUBLE STRANDED: <0.5 IU/ML
ANTI-NUCLEAR ANTIBODY (ANA): NEGATIVE
ENA ANTIBODIES SCREEN: 0.2 U/ML

## 2021-12-05 ENCOUNTER — APPOINTMENT (OUTPATIENT)
Dept: GENERAL RADIOLOGY | Age: 51
End: 2021-12-05
Payer: COMMERCIAL

## 2021-12-05 ENCOUNTER — HOSPITAL ENCOUNTER (EMERGENCY)
Age: 51
Discharge: HOME | End: 2021-12-05
Attending: EMERGENCY MEDICINE
Payer: COMMERCIAL

## 2021-12-05 VITALS
SYSTOLIC BLOOD PRESSURE: 120 MMHG | OXYGEN SATURATION: 98 % | DIASTOLIC BLOOD PRESSURE: 83 MMHG | TEMPERATURE: 97.9 F | HEART RATE: 73 BPM | HEIGHT: 68 IN | BODY MASS INDEX: 31.83 KG/M2 | WEIGHT: 210 LBS | RESPIRATION RATE: 18 BRPM

## 2021-12-05 DIAGNOSIS — R05.9 COUGH: Primary | ICD-10-CM

## 2021-12-05 LAB
INFLUENZA A: NOT DETECTED
INFLUENZA B: NOT DETECTED
SARS-COV-2 RNA, RT PCR: NOT DETECTED
SOURCE: NORMAL
SPECIMEN DESCRIPTION: NORMAL

## 2021-12-05 PROCEDURE — 6370000000 HC RX 637 (ALT 250 FOR IP): Performed by: EMERGENCY MEDICINE

## 2021-12-05 PROCEDURE — 87636 SARSCOV2 & INF A&B AMP PRB: CPT

## 2021-12-05 PROCEDURE — 71046 X-RAY EXAM CHEST 2 VIEWS: CPT

## 2021-12-05 PROCEDURE — 99284 EMERGENCY DEPT VISIT MOD MDM: CPT

## 2021-12-05 RX ORDER — AZITHROMYCIN 250 MG/1
250 TABLET, FILM COATED ORAL DAILY
Qty: 4 TABLET | Refills: 0 | Status: SHIPPED | OUTPATIENT
Start: 2021-12-06 | End: 2021-12-10

## 2021-12-05 RX ORDER — AZITHROMYCIN 250 MG/1
500 TABLET, FILM COATED ORAL ONCE
Status: COMPLETED | OUTPATIENT
Start: 2021-12-05 | End: 2021-12-05

## 2021-12-05 RX ADMIN — AZITHROMYCIN MONOHYDRATE 500 MG: 250 TABLET ORAL at 18:10

## 2021-12-05 ASSESSMENT — ENCOUNTER SYMPTOMS
DIARRHEA: 0
CONSTIPATION: 0
COUGH: 1
SHORTNESS OF BREATH: 1
SORE THROAT: 1
NAUSEA: 0
VOMITING: 0
CHEST TIGHTNESS: 0
TROUBLE SWALLOWING: 1
ABDOMINAL PAIN: 0
COLOR CHANGE: 0

## 2021-12-05 ASSESSMENT — PAIN DESCRIPTION - DESCRIPTORS: DESCRIPTORS: DISCOMFORT

## 2021-12-05 ASSESSMENT — PAIN DESCRIPTION - FREQUENCY: FREQUENCY: CONTINUOUS

## 2021-12-05 ASSESSMENT — PAIN SCALES - GENERAL: PAINLEVEL_OUTOF10: 4

## 2021-12-05 ASSESSMENT — PAIN DESCRIPTION - PAIN TYPE: TYPE: ACUTE PAIN

## 2021-12-05 ASSESSMENT — PAIN DESCRIPTION - LOCATION: LOCATION: GENERALIZED

## 2021-12-05 NOTE — ED PROVIDER NOTES
EMERGENCY DEPARTMENT ENCOUNTER   ATTENDING ATTESTATION     Pt Name: Cristiano Yan  MRN: [id number]  [place] 1970  Date of evaluation: 12/5/21       Cristiano Yan is a 49 y.o. female who presents with Cough      MDM:     URI and cough for 10 days not getting better with cough meds  Do not suspect PE  Pulse < 100 bpm  SaO2 > 94%  No unilateral leg swelling  No hemoptysis  No recent trauma or surgery  No prior PE or DVT  No hormone use    covid neg            Vitals:   Vitals:    12/05/21 1529   BP: 120/83   Pulse: 73   Resp: 18   Temp: 97.9 °F (36.6 °C)   TempSrc: Oral   SpO2: 98%   Weight: 210 lb (95.3 kg)   Height: 5' 8\" (1.727 m)         I personally evaluated and examined the patient in conjunction with the resident and agree with the assessment, treatment plan, and disposition of the patient as recorded by the resident. I performed a history and physical examination of the patient and discussed management with the resident. I reviewed the residents note and agree with the documented findings and plan of care. Any areas of disagreement are noted on the chart. I was personally present for the key portions of any procedures. I have documented in the chart those procedures where I was not present during the key portions. I have personally reviewed all images and agree with the resident's interpretation. I have reviewed the emergency nurses triage note. I agree with the chief complaint, past medical history, past surgical history, allergies, medications, social and family history as documented unless otherwise noted. The care is provided during an unprecedented national emergency due to the novel coronavirus, COVID 19.   Ankit Cannon MD  Attending Emergency Physician            Ankit Cannon MD  12/05/21 8426

## 2021-12-05 NOTE — ED PROVIDER NOTES
[address]  Emergency Department Encounter  EmergencyMedicine Resident     Pt Nadeemmekhi Poster  MRN: [id number]  [place] 1970  Date of evaluation: 12/5/21  PCP:  ALIDA Yen [id number]       Chief Complaint   Patient presents with    Cough       HISTORY OF PRESENT ILLNESS  (Location/Symptom, Timing/Onset, Context/Setting, Quality, Duration, Modifying Factors, Severity.)      Essie Hawkins is a 49 y.o. female who presents with cough, shortness of breath, sinus drainage, sore throat, fever for 11 days. Patient's been utilizing Flonase, over-the-counter cold and flu, Mucinex, with minimal benefit. Patient has been exposed to COVID-19. 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 Patient states that she has not had a fever last couple days, states that she has had worsening cough and shortness of breath and is concerned that she has pneumonia. Patient has done at home COVID-19 test with negative results. Patient has a history of seizures. PAST MEDICAL / SURGICAL / SOCIAL / FAMILY HISTORY      has a past medical history of Abnormal Pap smear of cervix, ADHD (attention deficit hyperactivity disorder), Anxiety, Depression, Fibromyalgia, Hyperlipidemia, Obesity, Unspecified sleep apnea, and UTI (urinary tract infection). Seizures, no additional pertinent.     has a past surgical history that includes Tonsillectomy and adenoidectomy; Carpal tunnel release (1993); Colonoscopy (11/07); Gastric bypass surgery (09/09/13); Cholecystectomy (09/09/13); and hernia repair (09/2016).   No additional pertinent    Social History     Socioeconomic History    Marital status:      Spouse name: Not on file    Number of children: Not on file    Years of education: Not on file    Highest education level: Not on file   Occupational History    Not on file   Tobacco Use    Smoking status: Former Smoker     Packs/day: 0.00     Years: 1.00     Pack years: 0.00     Quit date: 1994     Years since quittin.2    Smokeless tobacco: Never Used   Substance and Sexual Activity    Alcohol use: No    Drug use: No    Sexual activity: Not on file   Other Topics Concern    Not on file   Social History Narrative    Not on file     Social Determinants of Health     Financial Resource Strain:     Difficulty of Paying Living Expenses: Not on file   Food Insecurity:     Worried About Running Out of Food in the Last Year: Not on file    Cheikh of Food in the Last Year: Not on file   Transportation Needs:     Lack of Transportation (Medical): Not on file    Lack of Transportation (Non-Medical):  Not on file   Physical Activity:     Days of Exercise per Week: Not on file    Minutes of Exercise per Session: Not on file   Stress:     Feeling of Stress : Not on file   Social Connections:     Frequency of Communication with Friends and Family: Not on file    Frequency of Social Gatherings with Friends and Family: Not on file    Attends [employer] Services: Not on file    Active Member of [address] [place] [address] or Organizations: Not on file    Attends Club or Organization Meetings: Not on file    Marital Status: Not on file   Intimate Partner Violence:     Fear of Current or Ex-Partner: Not on file    Emotionally Abused: Not on file    Physically Abused: Not on file    Sexually Abused: Not on file   Housing Stability:     Unable to Pay for Housing in the Last Year: Not on file    Number of [place] in the Last Year: Not on file    Unstable Housing in the Last Year: Not on file       Family History   Problem Relation Age of Onset    Diabetes Mother     High Blood Pressure Mother     Heart Disease Mother     Heart Disease Father     Cancer Father         prostate    Cancer Paternal Grandmother         breast cancer at age 80       Allergies:  Bupropion, Morphine, Fluoxetine, Motrin [ibuprofen], and Nsaids    Home Medications:  Prior to Admission medications    Medication Sig Start Date End Date Taking? Authorizing Provider   azithromycin (ZITHROMAX) 250 MG tablet Take 1 tablet by mouth daily for 4 days Start first dose on Monday 12/6 12/6/21 12/10/21 Yes Georgina Schaeffer DO   busPIRone (BUSPAR) 7.5 MG tablet take 1 tablet by mouth twice a day if needed for anxiety 12/1/21   Reford Second, APRN - CNP   diclofenac sodium (VOLTAREN) 1 % GEL apply 4 grams topically four times a day AS NEEDED FOR PAIN 11/18/21   Reford Second, APRN - CNP   cyclobenzaprine (FLEXERIL) 5 MG tablet take 1 tablet by mouth three times a day if needed for muscle spasm 9/9/21   Reford Second, APRN - CNP   amphetamine-dextroamphetamine (ADDERALL) 15 MG tablet take 1 tablet by mouth twice a day 9/9/21 10/9/21  Reford Second, APRN - CNP   pantoprazole (PROTONIX) 40 MG tablet take 1 tablet by mouth once daily 5/18/21   Reford Second, APRN - CNP   sertraline (ZOLOFT) 50 MG tablet take 2 tablets by mouth once daily 5/18/21   Reford Second, APRN - CNP   levETIRAcetam (KEPPRA) 750 MG tablet take 1 tablet by mouth twice a day 3/25/21   Historical Provider, MD   fluticasone [facility]) 50 MCG/ACT nasal spray 1 spray by Nasal route daily 4/21/21   Reford Second, APRN - CNP   cyanocobalamin 1000 MCG/ML injection Inject 1 mL into the muscle every 30 days 10/30/20   Reford Second, APRN - CNP   Pantoprazole Sodium (PROTONIX PO) Take by mouth    Historical Provider, MD   loratadine (CLARITIN) 10 MG tablet take 1 tablet by mouth once daily if needed 4/15/20   Reford Second, APRN - CNP   levETIRAcetam (KEPPRA) 500 MG tablet Take 1 tablet by mouth 2 times daily 1/2/19   Reford Second, APRN - CNP   Mirabegron ER (MYRBETRIQ) 25 MG TB24 Take 1 tablet by mouth daily 11/2/18   Reford Second, APRN - CNP   IRON PO  10/15/13   Historical Provider, MD   Vitamin D (CHOLECALCIFEROL) 1000 UNITS CAPS capsule Take 1,000 Units by mouth daily. Historical Provider, MD   CALCIUM CITRATE PO Take 1,500 mg by mouth daily. Historical Provider, MD   acetaminophen (TYLENOL) 325 MG tablet Take 650 mg by mouth every 6 hours as needed. Historical Provider, MD       REVIEW OF SYSTEMS    (2-9 systems for level 4, 10 or more for level 5)      Review of Systems   Constitutional: Positive for fever. Negative for chills. HENT: Positive for congestion, sore throat and trouble swallowing. Respiratory: Positive for cough and shortness of breath. Negative for chest tightness. Cardiovascular: Negative for chest pain and leg swelling. Gastrointestinal: Negative for abdominal pain, constipation, diarrhea, nausea and vomiting. Endocrine: Negative for polyuria. Genitourinary: Negative for difficulty urinating. Skin: Negative for color change. Neurological: Negative for dizziness, weakness, light-headedness and headaches. Psychiatric/Behavioral: Negative for confusion. PHYSICAL EXAM   (up to 7 for level 4, 8 or more for level 5)      INITIAL VITALS:   /83   Pulse 73   Temp 97.9 °F (36.6 °C) (Oral)   Resp 18   Ht 5' 8\" (1.727 m)   Wt 210 lb (95.3 kg)   LMP 11/29/2017   SpO2 98%   BMI 31.93 kg/m²     Physical Exam  Constitutional:       Appearance: Normal appearance. She is obese. HENT:      Head: Normocephalic and atraumatic. Mouth/Throat:      Mouth: Mucous membranes are moist.      Pharynx: Oropharynx is clear. No oropharyngeal exudate or posterior oropharyngeal erythema. Eyes:      Extraocular Movements: Extraocular movements intact. Conjunctiva/sclera: Conjunctivae normal.   Cardiovascular:      Rate and Rhythm: Normal rate and regular rhythm. Pulses: Normal pulses. Heart sounds: Normal heart sounds. No murmur heard. Pulmonary:      Effort: Pulmonary effort is normal.      Breath sounds: Rhonchi present. Chest:      Chest wall: Tenderness present. Abdominal:      General: There is no distension. Tenderness: There is no abdominal tenderness. There is no guarding. Musculoskeletal:         General: Normal range of motion. Comments: Range of motion noted to be normal with patient's natural movements   Lymphadenopathy:      Cervical: No cervical adenopathy. Skin:     General: Skin is warm and dry. Findings: No rash (On exposed skin). Neurological:      General: No focal deficit present. Mental Status: She is alert and oriented to person, place, and time. Psychiatric:         Mood and Affect: Mood normal.         Behavior: Behavior normal.         DIFFERENTIAL  DIAGNOSIS     PLAN (LABS / IMAGING / EKG):  Orders Placed This Encounter   Procedures    COVID-19 & Influenza Combo    XR CHEST (2 VW)       MEDICATIONS ORDERED:  Orders Placed This Encounter   Medications    azithromycin (ZITHROMAX) tablet 500 mg     Order Specific Question:   Antimicrobial Indications     Answer:   Pneumonia (CAP)    azithromycin (ZITHROMAX) 250 MG tablet     Sig: Take 1 tablet by mouth daily for 4 days Start first dose on Monday 12/6     Dispense:  4 tablet     Refill:  0       DIAGNOSTIC RESULTS / EMERGENCY DEPARTMENT COURSE / MDM   LAB RESULTS:  Results for orders placed or performed during the hospital encounter of 12/05/21   COVID-19 & Influenza Combo    Specimen: Nasopharyngeal Swab   Result Value Ref Range    Specimen Description . NASOPHARYNGEAL SWAB     Source . NASOPHARYNGEAL SWAB     SARS-CoV-2 RNA, RT PCR Not Detected Not Detected    INFLUENZA A Not Detected Not Detected    INFLUENZA B Not Detected Not Detected       RADIOLOGY:  XR CHEST (2 VW)   Final Result   No acute process. PROCEDURES:  None    CONSULTS:  None    MEDICAL DECISION MAKING:  Patient presenting with acute upper respiratory and airway disease. Patient states is been going on for about 10 days with no benefit after taking Flonase, Mucinex, cold and flu.   Chest x-ray was obtained which was noted to be negative, patient describes shortness of breath and could be presenting with subclinical/atypical pneumonia. Patient was afebrile, severe infection not likely. Patient was noted to have negative Covid influenza a and B. Patient was discharged home with azithromycin for concerns for pneumonia and cough. Patient was discharged home in stable condition. CRITICAL CARE:  Please see attending note    FINAL IMPRESSION      1.  Cough          DISPOSITION / PLAN     DISPOSITION Decision To Discharge 12/05/2021 06:07:42 PM      PATIENT REFERRED TO:  Millie Hsieh, ALIDA - CNP  [address]  [address] [address]  [facility] [address]  [phone]    Schedule an appointment as soon as possible for a visit         DISCHARGE MEDICATIONS:  Discharge Medication List as of 12/5/2021  6:10 PM      START taking these medications    Details   azithromycin (ZITHROMAX) 250 MG tablet Take 1 tablet by mouth daily for 4 days Start first dose on Monday 12/6, Disp-4 tablet, R-0Print             Maksim Cortez DO  Emergency Medicine Resident    (Please note that portions of thisnote were completed with a voice recognition program.  Efforts were made to edit the dictations but occasionally words are mis-transcribed.)       Shan Nelson DO  Resident  12/06/21 5958

## 2021-12-28 ENCOUNTER — HOSPITAL ENCOUNTER (OUTPATIENT)
Dept: GENERAL RADIOLOGY | Facility: CLINIC | Age: 51
Discharge: HOME | End: 2021-12-30
Payer: COMMERCIAL

## 2021-12-28 ENCOUNTER — HOSPITAL ENCOUNTER (EMERGENCY)
Age: 51
Discharge: HOME | End: 2021-12-28
Attending: EMERGENCY MEDICINE
Payer: COMMERCIAL

## 2021-12-28 ENCOUNTER — HOSPITAL ENCOUNTER (OUTPATIENT)
Facility: CLINIC | Age: 51
Discharge: HOME | End: 2021-12-30
Payer: COMMERCIAL

## 2021-12-28 VITALS
SYSTOLIC BLOOD PRESSURE: 118 MMHG | TEMPERATURE: 98.1 F | HEART RATE: 95 BPM | DIASTOLIC BLOOD PRESSURE: 82 MMHG | HEIGHT: 68 IN | RESPIRATION RATE: 18 BRPM | WEIGHT: 205 LBS | OXYGEN SATURATION: 97 % | BODY MASS INDEX: 31.07 KG/M2

## 2021-12-28 DIAGNOSIS — R05.9 COUGH: ICD-10-CM

## 2021-12-28 DIAGNOSIS — U07.1 COVID-19: Primary | ICD-10-CM

## 2021-12-28 LAB
DIRECT EXAM: NORMAL
Lab: NORMAL
SARS-COV-2, RAPID: DETECTED
SPECIMEN DESCRIPTION: ABNORMAL
SPECIMEN DESCRIPTION: NORMAL

## 2021-12-28 PROCEDURE — 87880 STREP A ASSAY W/OPTIC: CPT

## 2021-12-28 PROCEDURE — 87635 SARS-COV-2 COVID-19 AMP PRB: CPT

## 2021-12-28 PROCEDURE — 99284 EMERGENCY DEPT VISIT MOD MDM: CPT

## 2021-12-28 PROCEDURE — 71046 X-RAY EXAM CHEST 2 VIEWS: CPT

## 2021-12-28 RX ORDER — BENZONATATE 100 MG/1
100-200 CAPSULE ORAL 3 TIMES DAILY PRN
Qty: 20 CAPSULE | Refills: 0 | Status: SHIPPED | OUTPATIENT
Start: 2021-12-28 | End: 2022-01-24

## 2021-12-28 ASSESSMENT — PAIN DESCRIPTION - PAIN TYPE: TYPE: ACUTE PAIN

## 2021-12-28 ASSESSMENT — PAIN DESCRIPTION - DESCRIPTORS: DESCRIPTORS: DISCOMFORT

## 2021-12-28 ASSESSMENT — PAIN SCALES - GENERAL: PAINLEVEL_OUTOF10: 3

## 2021-12-28 ASSESSMENT — PAIN DESCRIPTION - LOCATION: LOCATION: GENERALIZED

## 2021-12-28 ASSESSMENT — PAIN DESCRIPTION - FREQUENCY: FREQUENCY: CONTINUOUS

## 2021-12-28 NOTE — ED PROVIDER NOTES
Medications    ACETAMINOPHEN (TYLENOL) 325 MG TABLET    Take 650 mg by mouth every 6 hours as needed. AMPHETAMINE-DEXTROAMPHETAMINE (ADDERALL) 15 MG TABLET    take 1 tablet by mouth twice a day    BUSPIRONE (BUSPAR) 7.5 MG TABLET    take 1 tablet by mouth twice a day if needed for anxiety    CALCIUM CITRATE PO    Take 1,500 mg by mouth daily. CYANOCOBALAMIN 1000 MCG/ML INJECTION    Inject 1 mL into the muscle every 30 days    CYCLOBENZAPRINE (FLEXERIL) 5 MG TABLET    take 1 tablet by mouth three times a day if needed for muscle spasm    DICLOFENAC SODIUM (VOLTAREN) 1 % GEL    apply 4 grams topically four times a day AS NEEDED FOR PAIN    FLUTICASONE (FLONASE) 50 MCG/ACT NASAL SPRAY    1 spray by Nasal route daily    IRON PO        LEVETIRACETAM (KEPPRA) 500 MG TABLET    Take 1 tablet by mouth 2 times daily    LEVETIRACETAM (KEPPRA) 750 MG TABLET    take 1 tablet by mouth twice a day    LORATADINE (CLARITIN) 10 MG TABLET    take 1 tablet by mouth once daily if needed    MIRABEGRON ER (MYRBETRIQ) 25 MG TB24    Take 1 tablet by mouth daily    PANTOPRAZOLE (PROTONIX) 40 MG TABLET    take 1 tablet by mouth once daily    PANTOPRAZOLE SODIUM (PROTONIX PO)    Take by mouth    SERTRALINE (ZOLOFT) 50 MG TABLET    take 2 tablets by mouth once daily    VITAMIN D (CHOLECALCIFEROL) 1000 UNITS CAPS CAPSULE    Take 1,000 Units by mouth daily.        ALLERGIES     Bupropion, Morphine, Fluoxetine, Motrin [ibuprofen], and Nsaids    FAMILY HISTORY           Problem Relation Age of Onset    Diabetes Mother     High Blood Pressure Mother     Heart Disease Mother     Heart Disease Father     Cancer Father         prostate    Cancer Paternal Grandmother         breast cancer at age 80     Family Status   Relation Name Status    Mother     [facility] Father  Alive    MGM      MGF      [address]      PGF        None otherwise stated in nurses notes    SOCIAL HISTORY      reports that she quit smoking about 27 years ago. She smoked 0.00 packs per day for 1.00 year. She has never used smokeless tobacco. She reports that she does not drink alcohol and does not use drugs. lives at home with others     PHYSICAL EXAM    (up to 7 for level 4, 8 or more for level 5)     ED Triage Vitals [12/28/21 1332]   BP Temp Temp Source Pulse Resp SpO2 Height Weight   118/82 98.1 °F (36.7 °C) Oral 100 18 96 % 5' 8\" (1.727 m) 205 lb (93 kg)       Physical Exam   Nursing note and vitals reviewed. Constitutional: well-developed, well-nourished, nontoxic, well appearing, not distressed  HEENT:  normocephalic atraumatic, external ears normal appearance, no nasal deformity, no neck masses or edema, patient protecting airway, no stridor, phonating well  PP: erythematous with no tonsillar swelling or exudates. Uvula midline. Airway patent. No trismus. Controlling secretions. Eyes: pupils equal, sclera non-icteric, no discharge  Cardiovascular: no JVD  Respiratory: non-labored breathing, effort normal, no accessory muscle use visulized, no audible wheezing  Gastrointestinal: Abdomen not distended  Musculoskeletal: moves extremities without impaired range of motion, no deformity, no edema  Skin: no pallor, no rashes visible  Neuro: alert and oriented times 3, GCS 15, normal coordination, no dysarthria or aphasia  Psych: normal mood and affect, cooperative, normal thought content              DIAGNOSTIC RESULTS     EKG: All EKG's are interpreted by the Emergency Department Physician who either signs or Co-signs this chart in the absence of a cardiologist.        RADIOLOGY:   All plain film, CT, MRI, and formal ultrasound images (except ED bedside ultrasound) are read by the radiologist, see reports below, unless otherwise noted in MDM or here. No orders to display       XR CHEST (2 VW)    Result Date: 12/28/2021  EXAMINATION: TWO XRAY VIEWS OF THE CHEST 12/28/2021 1:08 pm COMPARISON: Chest radiograph performed 12/05/2021. HISTORY: ORDERING SYSTEM PROVIDED HISTORY: Cough TECHNOLOGIST PROVIDED HISTORY: cough, shortness of breath Reason for Exam: Pt states cough SOB x a few weeks worse the last few days FINDINGS: There is no acute consolidation or effusion. There is no pneumothorax. The mediastinal structures are unremarkable. The upper abdomen is unremarkable. The extrathoracic soft tissues are unremarkable. There is no acute osseous abnormality. No acute cardiopulmonary process. XR CHEST (2 VW)    Result Date: 12/5/2021  EXAMINATION: TWO XRAY VIEWS OF THE CHEST 12/5/2021 2:50 pm COMPARISON: 12/16/2014 HISTORY: ORDERING SYSTEM PROVIDED HISTORY: concern for pneumonia TECHNOLOGIST PROVIDED HISTORY: concern for pneumonia Reason for Exam: Concern for pneumonia Acuity: Acute Type of Exam: Initial FINDINGS: The lungs are without acute focal process. There is no effusion or pneumothorax. The cardiomediastinal silhouette is stable. The osseous structures are stable. No acute process. LABS:  Labs Reviewed   COVID-19, RAPID - Abnormal; Notable for the following components:       Result Value    SARS-CoV-2, Rapid DETECTED (*)     All other components within normal limits   STREP SCREEN GROUP A THROAT       All other labs were within normal range or not returned as of this dictation.     EMERGENCY DEPARTMENT COURSE and DIFFERENTIAL DIAGNOSIS/MDM:   Vitals:    Vitals:    12/28/21 1332 12/28/21 1447   BP: 118/82    Pulse: 100 95   Resp: 18    Temp: 98.1 °F (36.7 °C)    TempSrc: Oral    SpO2: 96% 97%   Weight: 205 lb (93 kg)    Height: 5' 8\" (1.727 m)          Patient instructed to return to the emergency room if symptoms worsen, return, or any other concern right away which is agreed by the patient    ED MEDS:  Orders Placed This Encounter   Medications    benzonatate (TESSALON) 100 MG capsule     Sig: Take 1-2 capsules by mouth 3 times daily as needed for Cough     Dispense:  20 capsule     Refill:  0 CONSULTS:  None    PROCEDURES:  None      FINAL IMPRESSION      1. COVID-19          DISPOSITION/PLAN   DISPOSITION Decision To Discharge    PATIENT REFERRED TO:  ALIDA Larson CNP  [address] [facility]  [address]  [address] [facility] [address] [facility] ED  [place] [address]  [phone]          DISCHARGE MEDICATIONS:  New Prescriptions    BENZONATATE (TESSALON) 100 MG CAPSULE    Take 1-2 capsules by mouth 3 times daily as needed for Cough         Summation      Patient Course:    URI symptoms with productive cough. Sinus congestion/ pressure, headache, post nasal drip. Chest xray outpatient today was unremarkable. Positive covid test.   Strep is negative. Low concern for PE. PERC Rule Negative  Age < 50 years  Pulse < 100 bpm  SaO2 > 94%  No unilateral leg swelling  No hemoptysis  No recent trauma or surgery  No prior PE or DVT  No hormone use    Pt is outside of the time frame for regeneron infusion. Recommend OTC medications. Rest, fluids. Return to the ED if symptoms change or worsen. Pt is agreeable with plan. Discussed results and plan with the pt. They expressed appropriate understanding. Pt given close follow up, supportive care instructions and strict return instructions at the bedside. The care is provided during an unprecedented national emergency due to the novel coronavirus, COVID-19. ED Medications administered this visit:  Medications - No data to display    New Prescriptions from this visit:    New Prescriptions    BENZONATATE (TESSALON) 100 MG CAPSULE    Take 1-2 capsules by mouth 3 times daily as needed for Cough       Follow-up:  ALIDA Larson CNP  [address] [facility] ED  [place] [address]  [phone]            Final Impression:   1.  COVID-19               (Please note that portions of this note were completed with a voice recognition program )        JALEESA Espana PA-C  12/28/21 2929

## 2021-12-28 NOTE — ED PROVIDER NOTES
eMERGENCY dEPARTMENT eNCOUnter   Independent Attestation     Pt Name: Dang Nicole  MRN: [id number]  [place] 1970  Date of evaluation: 12/28/21     Dang Nicole is a 49 y.o. female with CC: Shortness of Breath      Based on the medical record the care appears appropriate. I was personally available for consultation in the Emergency Department. The care is provided during an unprecedented national emergency due to the novel coronavirus, COVID 19.     Smita Ayers MD  Attending Emergency Physician                    Smita Ayers MD  12/28/21 [id number]

## 2021-12-28 NOTE — Clinical Note
Kaleb Magallanes was seen and treated in our emergency department on 12/28/2021. COVID19 virus is suspected. Per the CDC guidelines we recommend home isolation until the following conditions are all met:    1. At least 10 days have passed since symptoms first appeared and  2. At least 24 hours have passed since last fever without the use of fever-reducing medications and  3. Symptoms (e.g., cough, shortness of breath) have improved    If you have any questions or concerns, please don't hesitate to call.     She may return to work/school on 01/08/2022        John Luke PA-C

## 2021-12-29 ENCOUNTER — CARE COORDINATION (OUTPATIENT)
Dept: CARE COORDINATION | Age: 51
End: 2021-12-29

## 2021-12-29 NOTE — CARE COORDINATION
Patient contacted regarding COVID-19 diagnosis. Discussed COVID-19 related testing which was available at this time. Test results were positive. Patient informed of results, if available? Yes. LPN Care Coordinator contacted the patient by telephone to perform post discharge assessment. Call within 2 business days of discharge: Yes. Verified name and  with patient as identifiers. Provided introduction to self, and explanation of the CTN/ACM role, and reason for call due to risk factors for infection and/or exposure to COVID-19. Symptoms reviewed with patient who verbalized the following symptoms: no new symptoms and no worsening symptoms. Due to no new or worsening symptoms encounter was not routed to provider for escalation. Discussed follow-up appointments. If no appointment was previously scheduled, appointment scheduling offered: No.  [id number] follow up appointment(s): No future appointments. Non-[facility] follow up appointment(s): no    Non-face-to-face services provided:  Obtained and reviewed discharge summary and/or continuity of care documents     Advance Care Planning:   Does patient have an Advance Directive:  reviewed and current. Educated patient about risk for severe COVID-19 due to risk factors according to CDC guidelines. LPN CC reviewed discharge instructions, medical action plan and red flag symptoms with the patient who verbalized understanding. Discussed COVID vaccination status: Yes. Education provided on COVID-19 vaccination as appropriate. Discussed exposure protocols and quarantine with CDC Guidelines. Patient was given an opportunity to verbalize any questions and concerns and agrees to contact LPN CC or health care provider for questions related to their healthcare. Reviewed and educated patient on any new and changed medications related to discharge diagnosis     Was patient discharged with a pulse oximeter?  No Discussed and confirmed pulse oximeter discharge instructions

## 2021-12-29 NOTE — ACP (ADVANCE CARE PLANNING)
Advance Care Planning   Healthcare Decision Maker:    Primary Decision Maker: Teodora Li - [phone]    Click here to complete Healthcare Decision Makers including selection of the Healthcare Decision Maker Relationship (ie \"Primary\"). Today we documented Decision Maker(s) consistent with Legal Next of Kin hierarchy.

## 2022-01-24 ENCOUNTER — TELEPHONE (OUTPATIENT)
Dept: PSYCHIATRY | Age: 52
End: 2022-01-24

## 2022-01-24 NOTE — TELEPHONE ENCOUNTER
Warm handoff requested by ALIDA Okeefe CNP and was completed. Patient scheduled for future virtual health visit.

## 2022-01-26 ENCOUNTER — HOSPITAL ENCOUNTER (OUTPATIENT)
Dept: WOMENS IMAGING | Age: 52
Discharge: HOME | End: 2022-01-28
Payer: COMMERCIAL

## 2022-01-26 DIAGNOSIS — Z12.31 SCREENING MAMMOGRAM FOR HIGH-RISK PATIENT: ICD-10-CM

## 2022-01-26 PROCEDURE — 77063 BREAST TOMOSYNTHESIS BI: CPT

## 2022-01-27 ENCOUNTER — CLINICAL DOCUMENTATION (OUTPATIENT)
Dept: PSYCHIATRY | Age: 52
End: 2022-01-27

## 2022-01-27 ENCOUNTER — TELEMEDICINE (OUTPATIENT)
Dept: PSYCHIATRY | Age: 52
End: 2022-01-27
Payer: COMMERCIAL

## 2022-01-27 DIAGNOSIS — F10.10 ALCOHOL CONSUMPTION BINGE DRINKING: ICD-10-CM

## 2022-01-27 DIAGNOSIS — F33.9 MAJOR DEPRESSIVE DISORDER, RECURRENT EPISODE WITH ANXIOUS DISTRESS (HCC): Primary | ICD-10-CM

## 2022-01-27 DIAGNOSIS — Z91.49 PSYCHOLOGICAL TRAUMA HISTORY: ICD-10-CM

## 2022-01-27 PROCEDURE — 90792 PSYCH DIAG EVAL W/MED SRVCS: CPT | Performed by: NURSE PRACTITIONER

## 2022-01-27 RX ORDER — HYDROXYZINE HYDROCHLORIDE 10 MG/1
10 TABLET, FILM COATED ORAL 3 TIMES DAILY PRN
Qty: 90 TABLET | Refills: 2 | Status: SHIPPED | OUTPATIENT
Start: 2022-01-27

## 2022-01-27 RX ORDER — SERTRALINE HYDROCHLORIDE 100 MG/1
150 TABLET, FILM COATED ORAL DAILY
Qty: 45 TABLET | Refills: 0 | Status: SHIPPED | OUTPATIENT
Start: 2022-01-27 | End: 2022-06-16 | Stop reason: SDUPTHER

## 2022-01-27 RX ORDER — TRAZODONE HYDROCHLORIDE 50 MG/1
25 TABLET ORAL NIGHTLY PRN
Qty: 15 TABLET | Refills: 2 | Status: SHIPPED | OUTPATIENT
Start: 2022-01-27

## 2022-01-27 NOTE — PROGRESS NOTES
Therapist spoke with pt on phone to discuss services. Pt is seeking trauma therapy and wants a later appt. At this time therapist's latest appt available is Wed at 330. Pt will attend assessment on Wed 2/2/22 at 330 and will go from there.

## 2022-01-27 NOTE — PROGRESS NOTES
Behavioral Health Consultation  Barbara Hurt, MSN, APRN-CNP, PMHNP-BC  1/27/2022, 9:31 AM      Time spent with Patient:  60 minutes  This was a telehealth visit. Patient Location: home. Provider Location: office. This virtual visit was conducted via interactive/real-time audio/video     Emerson Lazaro, was evaluated through a synchronous (real-time) audio-video encounter. The patient (or guardian if applicable) is aware that this is a billable service, which includes applicable co-pays. This Virtual Visit was conducted with patient's (and/or legal guardian's) consent. The visit was conducted pursuant to the emergency declaration under the [address] [place] [address], [address] [place] [address] authority and the [employer] Act. Patient identification was verified, and a caregiver was present when appropriate. The patient was located in a state where the provider was licensed to provide care. Chief Complaint:anxiety and depression. Referring Provider:PCP    [place]:  Patient complains of a multi-year history of anxiety. She reports anhedonia, depressed mood, tearfulness, feelings of hopelessness, feelings of worthlessness/excessive guilt, insomnia, fatigue, changes in appetite/weight, decreased libido, difficulty concentrating, irritability, excessive worry, restlessness and panic attacks. She reports difficulty falling asleep, difficulty staying asleep, restless unsatisfying sleep and early morning waking on most nights of the week. Pt reports taking topamax, buspirone, adderall. Pt Symptoms/signs of donovan: none. She denies hallucinations. Symptoms have been unchanged with time. External stressors: illness or family illness.    Pt reports excessive worry or anxiety, difficulty controlling the worry, restlessness; feeling keyed up or on edge, easily fatigued, difficulty concentrating, irritability, muscle tension, sleep disturbance: difficulty falling asleep, difficulty staying asleep, restless unsatisfying sleep and early morning waking, for at least 6 months, anxiety/worry about a number of events/activities, causing significant distress in important areas of function and is not due to physiological effects of substances or medical condition. Patient exposed to traumatic event - actual or threatened death, serious injury, or sexual violence, intrusive symotoms:  images, thoughts, or perceptions of the event, dreams of the event, intense psychological distress when reminded of the event and intense physiological reactivity when reminded of the event, avoidance symptoms: avoids thoughts, feelings, conversations associated with the trauma and avoids external reminders :activities, people, places that arouse recollections of the trauma, negative alterations in cognition and mood:  persistent negative beliefs about oneself, others, or the world, negative emotional state, exhibits markedly diminished interest/participation in significant activities and feels detached/estranged from others, arousal symptoms : irritability or outbursts of anger, poor concentration, hyper-vigilance, exaggerated startle resopnse and difficulty falling or staying asleep and duration of disturbances is greater than one month. Pt denies any current exercise. Jaron Brown states that she eats around 2 meals a day. She states that she is a vegetarian and eats healthy. Social:, with 2 kids, age 28,13. 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 Has an associates degree. ETOH- self-reported binge drinking, consuming 2 bottles of wine at a time twice a week, reports last drink was 01/21/22. Denies all substance use. Caffeine- 2 cups of coffee in the morning and another at work. Past Psychiatric history:   The patient has a history of  depression and PTSD. Current treatment includes Anti-depressant: buspirone and Mood stabilizer: topamax.   Patient has dizziness/lightheadedness and weight loss from current treatment. Previous treatment has included: Prozac- felt it caused her nightmares, Paxil- can't remember much about this one, Zoloft- taking now- feels it is working, Celexa- can't remember much about this one, Lexapro- can't remember much about this one, Effexor- can't remember much about this one, Wellbutrin- caused a seizure, stimulant- on adderall currently, sleep aid- ambien- caused sleep walking and individual therapy- has done this one time in the past not trauma focused. Family Mental Health history:   Pertinent family history: bipolar disorder, alcoholism and drug abuse. Maternal side has alcohol issues, and cousin is bipolar. MSE:    Appearance: alert, cooperative, crying  Attention:Intact  Appetite: normal  Ambulation: unable to assess. Sleep disturbance: Yes  Loss of pleasure: Yes  Speech: spontaneous, normal rate, normal volume and well articulated  Mood: Depressed  Affect: depressed affect  Thought Content: intact, hopelessness, helplessness and worthlessness  Insight: Good  Judgment: Intact  Memory: Intact long-term and Intact short-term  Suicide Assessment: no suicidal ideation  Homicide Assessment: denies current homicidal ideation, plan and intent      History:      Review of Systems:   Constitutional: negative  HENT: negative  Eyes: negative  Respiratory: negative  Cardiovascular: negative  Gastrointestinal: negative, hx of diverticulitis  Genitourinary: negative  Musculoskeletal: negative  Skin:negative  Neurological:negative, hx of seizures. Endo/Heme/Allergies:positive for drug allergies listed.          Current Outpatient Medications:     sertraline (ZOLOFT) 100 MG tablet, Take 1.5 tablets by mouth daily, Disp: 45 tablet, Rfl: 0    hydrOXYzine (ATARAX) 10 MG tablet, Take 1 tablet by mouth 3 times daily as needed for Anxiety, Disp: 90 tablet, Rfl: 2    traZODone (DESYREL) 50 MG tablet, Take 0.5 tablets by mouth nightly as needed for Sleep, Disp: 15 tablet, Rfl: 2    topiramate (TOPAMAX) 100 MG tablet, Take 1 tablet by mouth 2 times daily, Disp: 60 tablet, Rfl: 3    busPIRone (BUSPAR) 7.5 MG tablet, take 1 tablet by mouth twice a day if needed for anxiety, Disp: 60 tablet, Rfl: 0    diclofenac sodium (VOLTAREN) 1 % GEL, apply 4 grams topically four times a day AS NEEDED FOR PAIN, Disp: 500 g, Rfl: 5    amphetamine-dextroamphetamine (ADDERALL) 15 MG tablet, take 1 tablet by mouth twice a day, Disp: 60 tablet, Rfl: 0    pantoprazole (PROTONIX) 40 MG tablet, take 1 tablet by mouth once daily, Disp: 90 tablet, Rfl: 0    levETIRAcetam (KEPPRA) 750 MG tablet, take 1 tablet by mouth twice a day, Disp: , Rfl:     fluticasone (FLONASE) 50 MCG/ACT nasal spray, 1 spray by Nasal route daily, Disp: 16 g, Rfl: 5    cyanocobalamin 1000 MCG/ML injection, Inject 1 mL into the muscle every 30 days, Disp: 1 mL, Rfl: 11    Pantoprazole Sodium (PROTONIX PO), Take by mouth, Disp: , Rfl:     loratadine (CLARITIN) 10 MG tablet, take 1 tablet by mouth once daily if needed, Disp: 30 tablet, Rfl: 5    Mirabegron ER (MYRBETRIQ) 25 MG TB24, Take 1 tablet by mouth daily, Disp: 14 tablet, Rfl: 0    IRON PO, , Disp: , Rfl:     Vitamin D (CHOLECALCIFEROL) 1000 UNITS CAPS capsule, Take 1,000 Units by mouth daily. , Disp: , Rfl:     CALCIUM CITRATE PO, Take 1,500 mg by mouth daily. , Disp: , Rfl:     acetaminophen (TYLENOL) 325 MG tablet, Take 650 mg by mouth every 6 hours as needed. , Disp: , Rfl:      PDMP Monitoring:    Last PDMP [place] as Reviewed [facility]):  Review User Review Instant Review Result   Darrin Franklin 1/27/2022  8:32 AM Reviewed PDMP [1]     Last Controlled Substance Monitoring Documentation      Telemedicine from 5/8/2020 in [facility]   Periodic Controlled Substance Monitoring No signs of potential drug abuse or diversion identified.  filed at 05/08/2020 0945   Chronic Pain > 50 MEDD Obtained or confirmed \"Consent for Opioid Use\" on file. filed at 2020 0945        Urine Drug Screenings (1 yr)    No resulted procedures found. Medication Contract and Consent for Opioid Use Documents Filed     Patient Documents     Type of Document Status Date Received Received By Description    Medication Contract Received 2017  9:18 AM [employer] Medication Agreement                 OARRS checked and there were no concerns of substance abuse, or prescription misuse. Social History     Socioeconomic History    Marital status:      Spouse name: Not on file    Number of children: Not on file    Years of education: Not on file    Highest education level: Not on file   Occupational History    Not on file   Tobacco Use    Smoking status: Former Smoker     Packs/day: 0.00     Years: 1.00     Pack years: 0.00     Quit date: 1994     Years since quittin.4    Smokeless tobacco: Never Used   Substance and Sexual Activity    Alcohol use: No    Drug use: No    Sexual activity: Not on file   Other Topics Concern    Not on file   Social History Narrative    Not on file     Social Determinants of Health     Financial Resource Strain: Low Risk     Difficulty of Paying Living Expenses: Not hard at all   Food Insecurity: No Food Insecurity    Worried About [address] in the Last Year: Never true    [address] [facility] [address] in the Last Year: Never true   Transportation Needs:     Lack of Transportation (Medical): Not on file    Lack of Transportation (Non-Medical):  Not on file   Physical Activity:     Days of Exercise per Week: Not on file    Minutes of Exercise per Session: Not on file   Stress:     Feeling of Stress : Not on file   Social Connections:     Frequency of Communication with Friends and Family: Not on file    Frequency of Social Gatherings with Friends and Family: Not on file    Attends [employer] Services: Not on file    Active Member of Clubs or Organizations: Not on file    Attends Club or Organization Meetings: Not on file    Marital Status: Not on file   Intimate Partner Violence:     Fear of Current or Ex-Partner: Not on file    Emotionally Abused: Not on file    Physically Abused: Not on file    Sexually Abused: Not on file   Housing Stability:     Unable to Pay for Housing in the Last Year: Not on file    Number of [place] in the Last Year: Not on file    Unstable Housing in the Last Year: Not on file       TOBACCO: [facility]  reports that she quit smoking about 27 years ago. She smoked 0.00 packs per day for 1.00 year. She has never used smokeless tobacco.  ETOH: [facility]  reports no history of alcohol use. Past Medical History:   Diagnosis Date    Abnormal Pap smear of cervix     ADHD (attention deficit hyperactivity disorder)     Anxiety     Depression     Fibromyalgia     Hyperlipidemia     Obesity     Unspecified sleep apnea     UTI (urinary tract infection)       Metabolic monitoring is being done by PCP.    Family History   Problem Relation Age of Onset    Diabetes Mother     High Blood Pressure Mother     Heart Disease Mother     Heart Disease Father     Cancer Father         prostate    Cancer Paternal Grandmother         breast cancer at age 80       Last Labs:   Lab Results   Component Value Date    LABA1C 5.7 04/22/2021     Lab Results   Component Value Date     04/22/2021      Lab Results   Component Value Date    WBC 6.3 04/22/2021    HGB 12.3 04/22/2021    HCT 38.0 04/22/2021    MCV 83.3 04/22/2021     04/22/2021    LYMPHOPCT 22.9 07/31/2019    RBC 4.56 04/22/2021    MCH 27.0 04/22/2021    MCHC 32.4 04/22/2021    RDW 14.0 04/22/2021          Lab Results   Component Value Date     (H) 04/22/2021    K 4.4 04/22/2021     (H) 04/22/2021    CO2 30 04/22/2021    BUN 11 04/22/2021    CREATININE 0.67 04/22/2021    GLUCOSE 122 (H) 04/22/2021    CALCIUM 9.4 04/22/2021    PROT 7.3 04/22/2021    LABALBU 4.3 04/22/2021    BILITOT 0.20 (L) 04/22/2021    ALKPHOS 67 04/22/2021    AST 27 04/22/2021    ALT 32 04/22/2021    LABGLOM >60 04/22/2021    GFRAA >60 04/22/2021    GLOB NOT REPORTED 09/09/2014      . last      Diagnosis:      1. Major depressive disorder, recurrent episode with anxious distress ([facility] [address]    2. Psychological trauma history    3. Alcohol consumption binge drinking        Plan:    Discussed increasing sertraline to 150mg/day. Discussed risks and benefits of medications, as well as need for yearly lab work. Follow up with therapist for continued therapy. No orders of the defined types were placed in this encounter.      Orders Placed This Encounter   Medications    sertraline (ZOLOFT) 100 MG tablet     Sig: Take 1.5 tablets by mouth daily     Dispense:  45 tablet     Refill:  0    hydrOXYzine (ATARAX) 10 MG tablet     Sig: Take 1 tablet by mouth 3 times daily as needed for Anxiety     Dispense:  90 tablet     Refill:  2    traZODone (DESYREL) 50 MG tablet     Sig: Take 0.5 tablets by mouth nightly as needed for Sleep     Dispense:  15 tablet     Refill:  2       Pt interventions:    Discussed importance of medication adherence, Discussed risks, benefits, side effects of medication and need for follow up treatment, Discussed benefits of referral for specialty care and Discussed self-care (sleep, nutrition, rewarding activities, social support, exercise)

## 2022-02-01 ENCOUNTER — CLINICAL DOCUMENTATION (OUTPATIENT)
Dept: PSYCHIATRY | Age: 52
End: 2022-02-01

## 2022-02-02 ENCOUNTER — HOSPITAL ENCOUNTER (OUTPATIENT)
Dept: PSYCHIATRY | Age: 52
Setting detail: THERAPIES SERIES
Discharge: HOME | End: 2022-02-02
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
## 2022-02-09 ENCOUNTER — HOSPITAL ENCOUNTER (OUTPATIENT)
Dept: PSYCHIATRY | Age: 52
Setting detail: THERAPIES SERIES
Discharge: HOME | End: 2022-02-09
Payer: COMMERCIAL

## 2022-02-09 PROCEDURE — 90837 PSYTX W PT 60 MINUTES: CPT | Performed by: SOCIAL WORKER

## 2022-02-09 NOTE — PSYCHOTHERAPY
TELEHEALTH EVALUATION -- Audio/Visual (During LLFQQ-62 public health emergency)     [address] [facility] Therapy Note  Gigi Higgins, [address]   2/9/2022  9:00 AM  Steph Dean  1970  [id number]    Patient location is at their home address. Location of clinician is at [address] [place] [address] located at [address] [facility], [facility]. Time spent with Patient: 60 minutes      Pt was provided informed consent for the [address] [facility]. Discussed with patient model of service to include the limits of confidentiality (i.e. abuse reporting, suicide intervention, etc.) and short-term intervention focused approach. Pt indicated understanding. S:  Pt and therapist engaged in a check in to process pt's last week. Pt reports she has felt more positive in the last week but has been stressed out by work. Pt and therapist processed pt's relationship with her M and her sister. Pt identifies that she has begun to feel angry at her M for how she grew up. Pt and therapist engaged in discussion to explore pt's thoughts and feelings about love. Pt expressed that despite what her M did, she could be loving and kind so she \"latched onto that. \"  Pt and therapist explored pt's boundaries and pt reports that she is a \"people pleaser\" and lets people \"microabuse\" her. Pt and therapist also discussed pt's triggers. Pt identified some of her triggers as mean people, one doctor from work, the thought of \"I'm useless\" and overthinking things. Pt reports that she often feels like no one notices or cares about her and she feels like she can't stand up for herself. Pt and therapist discussed her reactions to these triggers which pt identified as numbing her feelings and thoughts or drinking. Pt and therapist explored pt's level of alcohol use. Pt reports she doesn't want or crave alcohol and doesn't think about it.   Pt reports that she can go months without drinking but will then hang out with her sister or go to a function and have \"one drink\" which leads her to use again when she gets home. Pt drinks to numb her feelings and go to sleep. Pt denies that alcohol is a problem. Pt and therapist discussed using other positive coping skills and working to practice meditation/breathing daily. Therapist and pt collaboratively set treatment goals which can be found below and discussed potential trauma treatment. Pt asked about EMDR and therapist informed pt she was not trained in it. O:  MSE:     Appearance    alert, cooperative, mild distress  Appetite normal  Sleep disturbance Yes  Fatigue Yes  Loss of pleasure Yes  Impulsive behavior No  Speech    spontaneous, rapid and loud  Mood    Anxious  Affect    anxiety  Thought Content    Scattered   Thought Process    rapid and overabundance of ideas  Associations    logical connections  Insight    Fair  Judgment    Intact  Orientation    oriented to person, place, time, and general circumstances  Memory    recent and remote memory intact  Attention/Concentration    impaired  Morbid ideation No  Suicide Assessment    no suicidal ideation    A:  Pt presented to therapy as stressed and anxious. Pt's speech was scattered and would often move from topic to topic. Therapist had to use gentle interruption multiple times to redirect pt back to current topic. Pt appears in need of an outlet to vent or express her emotions. Pt has identified many needs in therapy including developing better coping skills, boundaries/assertive communication, self-esteem, and trauma. Pt appears to be unsure of herself so she will try to please others to obtain their attention/praise. Pt did not identify her drinking as an addiction or a problem. Therapist notes that pt's drinking is not consistent and she does not report any cravings, social/occupational impairment, or desire to drink.   Pt does use drinking to numb her feelings sporadically and reports that she drinks in large quantities. At this time pt does not meet criteria for alcohol use disorder but unspecified alcohol-related disorder as pt binge drinks as a negative coping skill.           Visit Diagnosis:   Post Traumatic Stress Disorder (f43.10)  Unspecified Alcohol-related disorder (f10.99)      History from Medical Record:        Diagnosis Date    Abnormal Pap smear of cervix     ADHD (attention deficit hyperactivity disorder)     Anxiety     Depression     Fibromyalgia     Hyperlipidemia     Obesity     Unspecified sleep apnea     UTI (urinary tract infection)      Medications:   Current Outpatient Medications   Medication Sig Dispense Refill    busPIRone (BUSPAR) 7.5 MG tablet take 1 tablet by mouth twice a day if needed for anxiety 60 tablet 0    sertraline (ZOLOFT) 100 MG tablet Take 1.5 tablets by mouth daily 45 tablet 0    hydrOXYzine (ATARAX) 10 MG tablet Take 1 tablet by mouth 3 times daily as needed for Anxiety 90 tablet 2    traZODone (DESYREL) 50 MG tablet Take 0.5 tablets by mouth nightly as needed for Sleep 15 tablet 2    topiramate (TOPAMAX) 100 MG tablet Take 1 tablet by mouth 2 times daily 60 tablet 3    diclofenac sodium (VOLTAREN) 1 % GEL apply 4 grams topically four times a day AS NEEDED FOR PAIN 500 g 5    amphetamine-dextroamphetamine (ADDERALL) 15 MG tablet take 1 tablet by mouth twice a day 60 tablet 0    pantoprazole (PROTONIX) 40 MG tablet take 1 tablet by mouth once daily 90 tablet 0    levETIRAcetam (KEPPRA) 750 MG tablet take 1 tablet by mouth twice a day      fluticasone (FLONASE) 50 MCG/ACT nasal spray 1 spray by Nasal route daily 16 g 5    cyanocobalamin 1000 MCG/ML injection Inject 1 mL into the muscle every 30 days 1 mL 11    Pantoprazole Sodium (PROTONIX PO) Take by mouth      loratadine (CLARITIN) 10 MG tablet take 1 tablet by mouth once daily if needed 30 tablet 5    Mirabegron ER (MYRBETRIQ) 25 MG TB24 Take 1 tablet by mouth daily 14 tablet 0    IRON PO       Vitamin D (CHOLECALCIFEROL) 1000 UNITS CAPS capsule Take 1,000 Units by mouth daily.  CALCIUM CITRATE PO Take 1,500 mg by mouth daily.  acetaminophen (TYLENOL) 325 MG tablet Take 650 mg by mouth every 6 hours as needed. No current facility-administered medications for this encounter. Social History:   Social History     Socioeconomic History    Marital status:      Spouse name: Not on file    Number of children: Not on file    Years of education: Not on file    Highest education level: Not on file   Occupational History    Not on file   Tobacco Use    Smoking status: Former Smoker     Packs/day: 0.00     Years: 1.00     Pack years: 0.00     Quit date: 1994     Years since quittin.4    Smokeless tobacco: Never Used   Substance and Sexual Activity    Alcohol use: No    Drug use: No    Sexual activity: Not on file   Other Topics Concern    Not on file   Social History Narrative    Not on file     Social Determinants of Health     Financial Resource Strain: Low Risk     Difficulty of Paying Living Expenses: Not hard at all   Food Insecurity: No Food Insecurity    Worried About [address] in the Last Year: Never true    [address]  in the Last Year: Never true   Transportation Needs:     Lack of Transportation (Medical): Not on file    Lack of Transportation (Non-Medical):  Not on file   Physical Activity:     Days of Exercise per Week: Not on file    Minutes of Exercise per Session: Not on file   Stress:     Feeling of Stress : Not on file   Social Connections:     Frequency of Communication with Friends and Family: Not on file    Frequency of Social Gatherings with Friends and Family: Not on file    Attends [employer] Services: Not on file    Active Member of Clubs or Organizations: Not on file    Attends Club or Organization Meetings: Not on file    Marital Status: Not on file   Intimate Partner Violence:     Fear of Current or Ex-Partner: Not on file    Emotionally Abused: Not on file    Physically Abused: Not on file    Sexually Abused: Not on file   Housing Stability:     Unable to Pay for Housing in the Last Year: Not on file    Number of Places Lived in the Last Year: Not on file    Unstable Housing in the Last Year: Not on file       TOBACCO:   reports that she quit smoking about 27 years ago. She smoked 0.00 packs per day for 1.00 year. She has never used smokeless tobacco.  ETOH:   reports no history of alcohol use. Family History:   Family History   Problem Relation Age of Onset    Diabetes Mother     High Blood Pressure Mother     Heart Disease Mother     Heart Disease Father     Cancer Father         prostate    Cancer Paternal Grandmother         breast cancer at age 80           Pt interventions:  Discussed potential treatments for  trauma, Provided education, Recommended limiting alcohol use or abstaining, Provided education on PTSD symptoms and treatment options for evidence-based treatment (Cognitive Processing Therapy and Prolonged Exposure), Established rapport, Conducted functional assessment, Supportive techniques, Identified maladaptive thoughts and Collaboratively set goals with pt re: tx plan below        PLAN:   Coping skills   Discuss boundaries   Trauma treatment modalities       Patient scheduled to return on:   Thursday 2/17/22 @ 5 PM     Were changes or additions made to the treatment plan today? YES [x]   NO []  Noted changes:    [facility], [address]    2/10/2022  8:44 AM  Vickey Dykes  1970  [id number]    Is this a Review? [] Yes   [x] No    Presenting Problem(s):  Pt has difficulty with anxiety, self-esteem, boundaries, coping skills, and past trauma. Pt has used alcohol to cope.        Diagnoses:  Post Traumatic Stress Disorder (f43.10)  Unspecified Alcohol-related disorder (f10.99)      Treatment Goals   [x] Develop appropriate boundaries and assertive communication.      As Evidenced By:  [x] Client Self Report  [] PCL-5  [] PHQ-9  [] RCADS  [] Other:    [x] Reduce Major Symptoms of: Trauma    As Evidenced By:  [] Client Self Report  [x] PCL-5 (Initial: 47)   [] PHQ-9  [] RCADS  [] Other:    [] Ameliorate Functional Impairments of:    As Evidenced By:  [] Client Self Report  [] PCL-5  [] PHQ-9  [] RCADS  [] Other:    [x] Develop Coping Strategies to Deal with Stress of: Past trauma, daily life,   As Evidenced By:  [x] Client Self Report  [] PCL-5  [] PHQ-9  [] RCADS  [x] Other: Reduced drinking     [] Stabilize (short term) Crisis of:    As Evidenced By:  [] Client Self Report  [] PCL-5  [] PHQ-9  [] RCADS  [] Other:    [] Maintain (long term) Stabilization of Symptoms of:    As Evidenced By:  [] Client Self Report  [] PCL-5  [] PHQ-9  [] RCADS  [] Other:    [] Medication Referral to:     [] Other: Increase self-esteem    As Evidenced By: increase in [place] Self-esteem scale (Initial: 12)    Planned Interventions-Patient Participation (must be consistent with treatment goals):  [x] Assertiveness Training   [] Affect Identification and Expression   [] Anger Management   [] Art Therapy/Creative Expressions  [x] CBT  [x] Cognitive Restructuring   [x] Communication Training   [] CPT  [] DBT  [] Decision Option Exploration   [] EMDR  [x] Empty Chair Technique  [] Grief Work   [x] Imagery/Relaxation Training   [] Integration of Spirituality/Uatsdin/Sally Based Practices  [x] Narrative Therapy  [] Parent Training   [] Pattern Identification and Interruption   [x] Problem Solving Skills Training   [x] Self/Other Boundaries Training   [] Self-Compassion Training  [] Solution Focused Techniques   [x] Stress Management   [x] Supportive Therapy   [] Other:    [] Assign Readings:   [] Assign Tasks of:   [] Educate Regarding:   [] Engage Significant Others in Treatment:   [] Explore/Monitor:   [] Facilitate Decision Making Regarding:   [] Obstacles to Change:  [] Preventive Strategies:   [] Referrals Planned:   [] Teach Skills of:   [] Use of Resources/Strengths:     Estimated Time Frames for Goals:  [] 5 sessions:    [] 10 sessions:    [] 15 sessions:    [] 20 sessions:    [x] 25 sessions:      [] Extensions to time frames or changes to previous treatment plan:      My therapist and I have developed this plan together, and I am in agreement to working on these issues and goals. I understand the plan that has been developed for my treatment. Signatures if providing Client with printed copy:    Patient Signature      Date      Therapist Signature      Date              Pursuant to the emergency declaration under the [facility] waiver authority and the [employer] Act, this Virtual Visit was conducted, with patient's consent, to reduce the patient's risk of exposure to COVID-19 and provide continuity of care for an established patient. Services were provided through a video synchronous discussion virtually to substitute for in-person clinic visit.

## 2022-02-17 ENCOUNTER — HOSPITAL ENCOUNTER (OUTPATIENT)
Dept: PSYCHIATRY | Age: 52
Setting detail: THERAPIES SERIES
Discharge: HOME | End: 2022-02-17
Payer: COMMERCIAL

## 2022-02-17 PROCEDURE — 90837 PSYTX W PT 60 MINUTES: CPT | Performed by: SOCIAL WORKER

## 2022-02-18 DIAGNOSIS — F33.9 MAJOR DEPRESSIVE DISORDER, RECURRENT EPISODE WITH ANXIOUS DISTRESS (HCC): ICD-10-CM

## 2022-02-18 RX ORDER — SERTRALINE HYDROCHLORIDE 100 MG/1
TABLET, FILM COATED ORAL
Qty: 45 TABLET | Refills: 0 | OUTPATIENT
Start: 2022-02-18
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
## 2022-02-24 ENCOUNTER — HOSPITAL ENCOUNTER (OUTPATIENT)
Dept: PSYCHIATRY | Age: 52
Setting detail: THERAPIES SERIES
Discharge: HOME | End: 2022-02-24
Payer: COMMERCIAL

## 2022-02-24 PROCEDURE — 90837 PSYTX W PT 60 MINUTES: CPT | Performed by: SOCIAL WORKER

## 2022-02-25 NOTE — PSYCHOTHERAPY
No  Fatigue No  Loss of pleasure No  Impulsive behavior No  Speech    spontaneous, rapid and loud  Mood    Anxious  Affect    elevated affect  Thought Content    worthlessness and cognitive distortions  Thought Process    rapid  Associations    logical connections  Insight    Good  Judgment    Intact  Orientation    oriented to person, place, time, and general circumstances  Memory    recent and remote memory intact  Attention/Concentration    impaired  Morbid ideation No  Suicide Assessment    no suicidal ideation    A:  Pt presented to therapy as anxious and talkative. Pt began speaking loudly and with a rapid pace, as pt does not like silence so she will fill the gaps in communication. Pt does not like to [employer]" with her thoughts and will keep talking so she does not have to think about them. Therapist needed to redirect pt multiple times but she accepted the redirection and was not offended. Pt did show good insight in connecting how she feels about how her coworker treat her with how she grew up. Pt often feels like \"no one stands up for her\" but is starting to believe she does not have to just \"take it. \"  Pt is consistently on edge and anxious, has recurring memories and nightmares. Pt uses a lot of self-doubting language and uses humor to cope. Pt would benefit from cognitive techniques to address thinking patterns.       Visit Diagnosis:   Post Traumatic Stress Disorder       History from Medical Record:        Diagnosis Date    Abnormal Pap smear of cervix     ADHD (attention deficit hyperactivity disorder)     Anxiety     Depression     Fibromyalgia     Hyperlipidemia     Obesity     Unspecified sleep apnea     UTI (urinary tract infection)      Medications:   Current Outpatient Medications   Medication Sig Dispense Refill    busPIRone (BUSPAR) 7.5 MG tablet take 1 tablet by mouth twice a day if needed for anxiety 60 tablet 0    sertraline (ZOLOFT) 100 MG tablet Take 1.5 tablets by mouth daily 45 tablet 0    hydrOXYzine (ATARAX) 10 MG tablet Take 1 tablet by mouth 3 times daily as needed for Anxiety 90 tablet 2    traZODone (DESYREL) 50 MG tablet Take 0.5 tablets by mouth nightly as needed for Sleep 15 tablet 2    topiramate (TOPAMAX) 100 MG tablet Take 1 tablet by mouth 2 times daily 60 tablet 3    diclofenac sodium (VOLTAREN) 1 % GEL apply 4 grams topically four times a day AS NEEDED FOR PAIN 500 g 5    amphetamine-dextroamphetamine (ADDERALL) 15 MG tablet take 1 tablet by mouth twice a day 60 tablet 0    pantoprazole (PROTONIX) 40 MG tablet take 1 tablet by mouth once daily 90 tablet 0    levETIRAcetam (KEPPRA) 750 MG tablet take 1 tablet by mouth twice a day      fluticasone (FLONASE) 50 MCG/ACT nasal spray 1 spray by Nasal route daily 16 g 5    cyanocobalamin 1000 MCG/ML injection Inject 1 mL into the muscle every 30 days 1 mL 11    Pantoprazole Sodium (PROTONIX PO) Take by mouth      loratadine (CLARITIN) 10 MG tablet take 1 tablet by mouth once daily if needed 30 tablet 5    Mirabegron ER (MYRBETRIQ) 25 MG TB24 Take 1 tablet by mouth daily 14 tablet 0    IRON PO       Vitamin D (CHOLECALCIFEROL) 1000 UNITS CAPS capsule Take 1,000 Units by mouth daily.  CALCIUM CITRATE PO Take 1,500 mg by mouth daily.  acetaminophen (TYLENOL) 325 MG tablet Take 650 mg by mouth every 6 hours as needed. No current facility-administered medications for this encounter.        Social History:   Social History     Socioeconomic History    Marital status:      Spouse name: Not on file    Number of children: Not on file    Years of education: Not on file    Highest education level: Not on file   Occupational History    Not on file   Tobacco Use    Smoking status: Former Smoker     Packs/day: 0.00     Years: 1.00     Pack years: 0.00     Quit date: 1994     Years since quittin.5    Smokeless tobacco: Never Used   Substance and Sexual Activity    Alcohol use: No    Drug use: No    Sexual activity: Not on file   Other Topics Concern    Not on file   Social History Narrative    Not on file     Social Determinants of Health     Financial Resource Strain: Low Risk     Difficulty of Paying Living Expenses: Not hard at all   Food Insecurity: No Food Insecurity    Worried About Running Out of Food in the Last Year: Never true    [address] in the Last Year: Never true   Transportation Needs:     Lack of Transportation (Medical): Not on file    Lack of Transportation (Non-Medical): Not on file   Physical Activity:     Days of Exercise per Week: Not on file    Minutes of Exercise per Session: Not on file   Stress:     Feeling of Stress : Not on file   Social Connections:     Frequency of Communication with Friends and Family: Not on file    Frequency of Social Gatherings with Friends and Family: Not on file    Attends Zoroastrian Services: Not on file    Active Member of [address] [place] [address] [email or web address] or Organizations: Not on file    Attends Club or Organization Meetings: Not on file    Marital Status: Not on file   Intimate Partner Violence:     Fear of Current or Ex-Partner: Not on file    Emotionally Abused: Not on file    Physically Abused: Not on file    Sexually Abused: Not on file   Housing Stability:     Unable to Pay for Housing in the Last Year: Not on file    Number of [place] in the Last Year: Not on file    Unstable Housing in the Last Year: Not on file       TOBACCO:   reports that she quit smoking about 27 years ago. She smoked 0.00 packs per day for 1.00 year. She has never used smokeless tobacco.  ETOH:   reports no history of alcohol use.     Family History:   Family History   Problem Relation Age of Onset    Diabetes Mother     High Blood Pressure Mother     Heart Disease Mother     Heart Disease Father     Cancer Father         prostate    Cancer Paternal Grandmother         breast cancer at age 80           Pt interventions:  Practiced assertive communication, Trained in improving communication skills, Provided education, Discussed self-care (sleep, nutrition, rewarding activities, social support, exercise), Provided education on PTSD symptoms and treatment options for evidence-based treatment (Cognitive Processing Therapy and Prolonged Exposure), Established rapport, Conducted functional assessment and Identified maladaptive thoughts        PLAN:   Self-care  Relationship with daughter  Thinking patterns     Patient scheduled to return on:   Thursday 3/3/2022 at 5 PM    Were changes or additions made to the treatment plan today?   YES []   NO []  Noted changes:

## 2022-03-03 ENCOUNTER — HOSPITAL ENCOUNTER (OUTPATIENT)
Dept: PSYCHIATRY | Age: 52
Setting detail: THERAPIES SERIES
Discharge: HOME | End: 2022-03-03
Payer: COMMERCIAL

## 2022-03-03 PROCEDURE — 90837 PSYTX W PT 60 MINUTES: CPT | Performed by: SOCIAL WORKER
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
## 2022-03-10 ENCOUNTER — HOSPITAL ENCOUNTER (OUTPATIENT)
Dept: PSYCHIATRY | Age: 52
Setting detail: THERAPIES SERIES
Discharge: HOME | End: 2022-03-10
Payer: COMMERCIAL

## 2022-03-10 PROCEDURE — 90837 PSYTX W PT 60 MINUTES: CPT | Performed by: SOCIAL WORKER

## 2022-03-11 NOTE — PSYCHOTHERAPY
[facility], [address]   3/10/2022  5:00 PM  Negra Gone  1970  [id number]    Time spent with Patient: 60 minutes      Pt was provided informed consent for the [facility]. Discussed with patient model of service to include the limits of confidentiality (i.e. abuse reporting, suicide intervention, etc.) and short-term intervention focused approach. Pt indicated understanding. S:  Pt and therapist met for session and engaged in check in. Pt reports she is off her Keppra medication and feels like it might be affecting her mood. Pt reports feeling \"bitchy\" to people lately as her body adjusts. Therapist and pt processed situations and therapist offered emotional support and used active listening to clarify pt's thoughts and feelings and cognitive strategies to address them. Pt and therapist discussed assertiveness and boundaries vs simply feeling \"like a bitch. \"  Therapist helped pt reframe negative thoughts and discussed pt's relationships with her two children. Pt identified that she feels heartbroken that she and her daughter are not close and often \"goes in circles\" looking for reasons why her daughter is distant. Therapist and pt discussed control, grieving the loss of the relationship with her daughter, and loneliness. Pt and therapist also explored pt's discomfort in silence. Therapist and pt discussed stress signals and stress management and therapist gave pt sheets to look over that identify physical symptoms of stress. Therapist offered psychoeducation on daily use of relaxation/grounding techniques and recommended the book: The Body Keeps the Score.           O:  MSE:     Appearance    alert, cooperative, mild distress  Appetite normal  Sleep disturbance Yes  Fatigue Yes  Loss of pleasure No  Impulsive behavior No  Speech    rapid, loud and interrupting  Mood    Anxious  Low self-esteem  Affect    anxiety  Thought Content cognitive distortions  Thought Process    linear, goal directed and coherent  Associations    logical connections  Insight    Good  Judgment    Intact  Orientation    oriented to person, place, time, and general circumstances  Memory    recent and remote memory intact  Attention/Concentration    impaired  Morbid ideation No  Suicide Assessment    no suicidal ideation    A:  Pt presented to session as anxious and tired. Pt was able to address some of her anxiety and identify negative thoughts. Pt speaks quickly and struggles to stay on topic. Pt has identified that she does not like silence so she will fill the quiet with talking. Pt uses humor to cope in difficult situations and will often use self-depricating jokes. Pt would benefit from daily use of grounding and relaxation skills. Therapist assesses that pt is in need of stabilization before beginning a trauma narrative/discussing her trauma.      Visit Diagnosis:   PTSD      History from Medical Record:        Diagnosis Date    Abnormal Pap smear of cervix     ADHD (attention deficit hyperactivity disorder)     Anxiety     Depression     Fibromyalgia     Hyperlipidemia     Obesity     Unspecified sleep apnea     UTI (urinary tract infection)      Medications:   Current Outpatient Medications   Medication Sig Dispense Refill    pantoprazole (PROTONIX) 40 MG tablet take 1 tablet by mouth once daily 90 tablet 0    busPIRone (BUSPAR) 7.5 MG tablet take 1 tablet by mouth twice a day if needed for anxiety 60 tablet 0    sertraline (ZOLOFT) 100 MG tablet Take 1.5 tablets by mouth daily 45 tablet 0    hydrOXYzine (ATARAX) 10 MG tablet Take 1 tablet by mouth 3 times daily as needed for Anxiety 90 tablet 2    traZODone (DESYREL) 50 MG tablet Take 0.5 tablets by mouth nightly as needed for Sleep 15 tablet 2    topiramate (TOPAMAX) 100 MG tablet Take 1 tablet by mouth 2 times daily 60 tablet 3    diclofenac sodium (VOLTAREN) 1 % GEL apply 4 grams topically four times a day AS NEEDED FOR PAIN 500 g 5    amphetamine-dextroamphetamine (ADDERALL) 15 MG tablet take 1 tablet by mouth twice a day 60 tablet 0    levETIRAcetam (KEPPRA) 750 MG tablet take 1 tablet by mouth twice a day      fluticasone (FLONASE) 50 MCG/ACT nasal spray 1 spray by Nasal route daily 16 g 5    cyanocobalamin 1000 MCG/ML injection Inject 1 mL into the muscle every 30 days 1 mL 11    Pantoprazole Sodium (PROTONIX PO) Take by mouth      loratadine (CLARITIN) 10 MG tablet take 1 tablet by mouth once daily if needed 30 tablet 5    Mirabegron ER (MYRBETRIQ) 25 MG TB24 Take 1 tablet by mouth daily 14 tablet 0    IRON PO       Vitamin D (CHOLECALCIFEROL) 1000 UNITS CAPS capsule Take 1,000 Units by mouth daily.  CALCIUM CITRATE PO Take 1,500 mg by mouth daily.  acetaminophen (TYLENOL) 325 MG tablet Take 650 mg by mouth every 6 hours as needed. No current facility-administered medications for this encounter.        Social History:   Social History     Socioeconomic History    Marital status:      Spouse name: Not on file    Number of children: Not on file    Years of education: Not on file    Highest education level: Not on file   Occupational History    Not on file   Tobacco Use    Smoking status: Former Smoker     Packs/day: 0.00     Years: 1.00     Pack years: 0.00     Quit date: 1994     Years since quittin.5    Smokeless tobacco: Never Used   Substance and Sexual Activity    Alcohol use: No    Drug use: No    Sexual activity: Not on file   Other Topics Concern    Not on file   Social History Narrative    Not on file     Social Determinants of Health     Financial Resource Strain: Low Risk     Difficulty of Paying Living Expenses: Not hard at all   Food Insecurity: No Food Insecurity    Worried About [address] in the Last Year: Never true    [employer] of Food in the Last Year: Never true   Transportation Needs:     Lack of Transportation (Medical): Not on file    Lack of Transportation (Non-Medical): Not on file   Physical Activity:     Days of Exercise per Week: Not on file    Minutes of Exercise per Session: Not on file   Stress:     Feeling of Stress : Not on file   Social Connections:     Frequency of Communication with Friends and Family: Not on file    Frequency of Social Gatherings with Friends and Family: Not on file    Attends Oriental orthodox Services: Not on file    Active Member of [address] [place] [address] [employer] or Organizations: Not on file    Attends Club or Organization Meetings: Not on file    Marital Status: Not on file   Intimate Partner Violence:     Fear of Current or Ex-Partner: Not on file    Emotionally Abused: Not on file    Physically Abused: Not on file    Sexually Abused: Not on file   Housing Stability:     Unable to Pay for Housing in the Last Year: Not on file    Number of [place] in the Last Year: Not on file    Unstable Housing in the Last Year: Not on file       TOBACCO:   reports that she quit smoking about 27 years ago. She smoked 0.00 packs per day for 1.00 year. She has never used smokeless tobacco.  ETOH:   reports no history of alcohol use.     Family History:   Family History   Problem Relation Age of Onset    Diabetes Mother     High Blood Pressure Mother     Heart Disease Mother     Heart Disease Father     Cancer Father         prostate    Cancer Paternal Grandmother         breast cancer at age 80           Pt interventions:  Provided handout on  anxiety and Grief, Practiced assertive communication, Discussed various factors related to the development and maintenance of  stress (including biological, cognitive, behavioral, and environmental factors), Trained in relaxation strategies, Trained in improving communication skills, Provided education, Discussed self-care (sleep, nutrition, rewarding activities, social support, exercise), Discussed potential barriers to change, Discussed use of imagery, distractions, relaxation, mood management, communication training, questioning unhelpful thinking, problem-solving, and behavioral activation to manage pain, Established rapport, Supportive techniques, CBT to target cognitive distortions and Identified maladaptive thoughts        PLAN:   Cognitive Distortions   Stress Management       Patient scheduled to return on:   3/17/2022    Were changes or additions made to the treatment plan today?   YES []   NO []  Noted changes:

## 2022-03-17 ENCOUNTER — HOSPITAL ENCOUNTER (OUTPATIENT)
Dept: PSYCHIATRY | Age: 52
Setting detail: THERAPIES SERIES
Discharge: HOME | End: 2022-03-17
Payer: COMMERCIAL

## 2022-03-17 PROCEDURE — 90837 PSYTX W PT 60 MINUTES: CPT | Performed by: SOCIAL WORKER

## 2022-03-18 NOTE — PSYCHOTHERAPY
[facility] Therapy Note in [facility], [address]   3/17/2022  5:00 PM  Renard Haas  1970  [id number]    Time spent with Patient: 60 minutes      Pt was provided informed consent for the [address] [place], [place] [address]. Discussed with patient model of service to include the limits of confidentiality (i.e. abuse reporting, suicide intervention, etc.) and short-term intervention focused approach. Pt indicated understanding. S:    Pt and therapist engaged in check in as pt presented in a low mood and anxious. Pt and therapist processed pt's previous day in which pt identified was \"the universe against her. \"  Pt identified many negative aspects of her day including being frustrated with her  for forgetting to pay the mortgage. Therapist and pt engaged in [email or web address] and other cognitive techniques to address thoughts and therapist offered emotional support. Therapist and pt discussed stress management, boundaries, and pt's unhappiness at her job. Therapist offered psychoeducation on PTSD and anxiety and assisted pt in reframing negative thoughts. Therapist challenged pt to replace negative self-talk with 3 positive things about herself. Therapist introduced cognitive distortions and will continue to go through them with pt next week.           O:  MSE:     Appearance    alert, cooperative, mild distress  Appetite normal  Sleep disturbance Yes  Fatigue No  Loss of pleasure No  Impulsive behavior No  Speech    spontaneous, normal volume and rapid  Mood    Anxious  Angry  Depressed  Low self-esteem  Affect    agitation  Thought Content    excessive guilt, cognitive distortions and all or nothing thinking  Thought Process    coherent   Associations    logical connections  Insight    Good  Judgment    Intact  Orientation    oriented to person, place, time, and general circumstances  Memory    recent and remote memory intact  Attention/Concentration    intact  Morbid ideation No  Suicide Assessment    no suicidal ideation    A:  Pt presented as both low and anxious and in need of processing her previous day. Pt reported many negative thoughts directed towards others and herself and felt guilty about some of them. Pt was able to process and emotionally regulate herself and began to identify patterns. 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 Pt has developed some stress management skills and is ready to begin talking about cognitive distortions.       Visit Diagnosis:   Post Traumatic Stress Disorder       History from Medical Record:        Diagnosis Date    Abnormal Pap smear of cervix     ADHD (attention deficit hyperactivity disorder)     Anxiety     Depression     Fibromyalgia     Hyperlipidemia     Obesity     Unspecified sleep apnea     UTI (urinary tract infection)      Medications:   Current Outpatient Medications   Medication Sig Dispense Refill    pantoprazole (PROTONIX) 40 MG tablet take 1 tablet by mouth once daily 90 tablet 0    busPIRone (BUSPAR) 7.5 MG tablet take 1 tablet by mouth twice a day if needed for anxiety 60 tablet 0    sertraline (ZOLOFT) 100 MG tablet Take 1.5 tablets by mouth daily 45 tablet 0    hydrOXYzine (ATARAX) 10 MG tablet Take 1 tablet by mouth 3 times daily as needed for Anxiety 90 tablet 2    traZODone (DESYREL) 50 MG tablet Take 0.5 tablets by mouth nightly as needed for Sleep 15 tablet 2    topiramate (TOPAMAX) 100 MG tablet Take 1 tablet by mouth 2 times daily 60 tablet 3    diclofenac sodium (VOLTAREN) 1 % GEL apply 4 grams topically four times a day AS NEEDED FOR PAIN 500 g 5    amphetamine-dextroamphetamine (ADDERALL) 15 MG tablet take 1 tablet by mouth twice a day 60 tablet 0    levETIRAcetam (KEPPRA) 750 MG tablet take 1 tablet by mouth twice a day      fluticasone (FLONASE) 50 MCG/ACT nasal spray 1 spray by Nasal route daily 16 g 5    cyanocobalamin 1000 MCG/ML injection Inject 1 mL into the muscle every 30 days 1 mL 11    Pantoprazole Sodium (PROTONIX PO) Take by mouth      loratadine (CLARITIN) 10 MG tablet take 1 tablet by mouth once daily if needed 30 tablet 5    Mirabegron ER (MYRBETRIQ) 25 MG TB24 Take 1 tablet by mouth daily 14 tablet 0    IRON PO       Vitamin D (CHOLECALCIFEROL) 1000 UNITS CAPS capsule Take 1,000 Units by mouth daily.  CALCIUM CITRATE PO Take 1,500 mg by mouth daily.  acetaminophen (TYLENOL) 325 MG tablet Take 650 mg by mouth every 6 hours as needed. No current facility-administered medications for this encounter. Social History:   Social History     Socioeconomic History    Marital status:      Spouse name: Not on file    Number of children: Not on file    Years of education: Not on file    Highest education level: Not on file   Occupational History    Not on file   Tobacco Use    Smoking status: Former Smoker     Packs/day: 0.00     Years: 1.00     Pack years: 0.00     Quit date: 1994     Years since quittin.5    Smokeless tobacco: Never Used   Substance and Sexual Activity    Alcohol use: No    Drug use: No    Sexual activity: Not on file   Other Topics Concern    Not on file   Social History Narrative    Not on file     Social Determinants of Health     Financial Resource Strain: Low Risk     Difficulty of Paying Living Expenses: Not hard at all   Food Insecurity: No Food Insecurity    Worried About [facility] in the Last Year: Never true    [facility] in the Last Year: Never true   Transportation Needs:     Lack of Transportation (Medical): Not on file    Lack of Transportation (Non-Medical):  Not on file   Physical Activity:     Days of Exercise per Week: Not on file    Minutes of Exercise per Session: Not on file   Stress:     Feeling of Stress : Not on file   Social Connections:     Frequency of Communication with Friends and Family: Not on file    Frequency of Social Gatherings with Friends and Family: Not on file    Attends Taoism Services: Not on file    Active Member of Clubs or Organizations: Not on file    Attends Club or Organization Meetings: Not on file    Marital Status: Not on file   Intimate Partner Violence:     Fear of Current or Ex-Partner: Not on file    Emotionally Abused: Not on file    Physically Abused: Not on file    Sexually Abused: Not on file   Housing Stability:     Unable to Pay for Housing in the Last Year: Not on file    Number of [place] in the Last Year: Not on file    Unstable Housing in the Last Year: Not on file       TOBACCO:   reports that she quit smoking about 27 years ago. She smoked 0.00 packs per day for 1.00 year. She has never used smokeless tobacco.  ETOH:   reports no history of alcohol use. Family History:   Family History   Problem Relation Age of Onset    Diabetes Mother     High Blood Pressure Mother     Heart Disease Mother     Heart Disease Father     Cancer Father         prostate    Cancer Paternal Grandmother         breast cancer at age 80           Pt interventions:  Provided handout on  cognitive distortions, Trained in strategies for increasing balanced thinking, Trained in relaxation strategies, Trained in improving communication skills, Provided education, Provided education on PTSD symptoms and treatment options for evidence-based treatment (Cognitive Processing Therapy and Prolonged Exposure), Discussed potential barriers to change, Established rapport, Supportive techniques, CBT to target negative thoughts, Cognitive strategies to target negative thoughts  including socratic dialogue and Identified maladaptive thoughts        PLAN:   Cognitive Distortions       Patient scheduled to return on:   Thursday 3/24/2022    Were changes or additions made to the treatment plan today?   YES []   NO [x]  Noted changes:

## 2022-03-24 ENCOUNTER — HOSPITAL ENCOUNTER (OUTPATIENT)
Dept: PSYCHIATRY | Age: 52
Setting detail: THERAPIES SERIES
Discharge: HOME | End: 2022-03-24
Payer: COMMERCIAL

## 2022-03-24 PROCEDURE — 90837 PSYTX W PT 60 MINUTES: CPT | Performed by: SOCIAL WORKER

## 2022-03-25 NOTE — PSYCHOTHERAPY
[facility] Therapy Note in [facility], [address]   3/24/2022  5:00 PM  Carlos Medellin  1970  [id number]    Time spent with Patient: 60 minutes      Pt was provided informed consent for the [address]. Discussed with patient model of service to include the limits of confidentiality (i.e. abuse reporting, suicide intervention, etc.) and short-term intervention focused approach. Pt indicated understanding. S:  Pt and therapist engaged in [address] [place] [address] and therapist offered emotional support and positive regard to pt. Therapist used active listening throughout session to help pt feel heard and stay on track. Pt and therapist discussed pt's ability to maintain boundaries and manage emotions when she was mad at her sister. Therapist and pt practiced assertive communication. Therapist then introduced cognitive techniques such as socratic questioning and cognitive distortions. Therapist offered psychoeducation on cognitive distortions and how to reframe negative thoughts. Therapist and pt discussed magnification and minimization and uncovered some feelings of abandonment from M, worthlessness, and low self-esteem. Pt and therapist made connections between now and past trauma, and started to identify triggers. Pt identified that her M never offered compliments so she believes herself to be unworthy. Pt also reports she had to learn to \"feel the mood of others\" and did whatever M wanted to survive. Therapist offered support to pt when she became tearful and engaged in breathing techniques with pt. Therapist assigned pt homework of completing a thought log and therapist and pt problem solved a way to keep pt on track of the conversation. Therapist will set a timer for 10 minutes for pt to talk about work and process her day before moving on to therapy agenda.             O:  MSE:     Appearance    alert, cooperative, crying, moderate distress  Appetite normal  Sleep disturbance Yes  Fatigue Yes  Loss of pleasure No  Impulsive behavior No  Speech    spontaneous, rapid and loud  Mood    Anxious  Worthless  Low self-esteem  Affect    anxiety  Thought Content    intact  Thought Process    linear, goal directed and coherent  Associations    logical connections  Insight    Good  Judgment    Intact  Orientation    oriented to person, place, time, and general circumstances  Memory    recent and remote memory intact  Attention/Concentration    intact  Morbid ideation No  Suicide Assessment    no suicidal ideation    A:  Pt presented to session as stressed and became tearful at points during session. Pt cried heavily and needed deep breaths to regulate her emotions. Pt speaks quickly and easily gets off track, but was able to stay on topic for a good amount of the session. Pt was able to make connections between her past and current stressful situations and identified that she is mad at her M. Pt feels unworthy of compliments and has recurrent negative thoughts about herself. Pt has also developed hypervigilance skills for being in [place] mode\" that she still experiences today. Pt did show progress in setting boundaries and regulating emotions as identified in processing about her work. Visit Diagnosis:   Post Traumatic Stress Disorder -Pt is reporting intrusive and arousal symptoms. Pt is experiencing repeated dreams, memories, and flashbacks of the stressful experience. Pt is avoiding memories and external reminders. Pt has strong physical and emotional reactions to these exposures. Pt reports strong negative beliefs about herself. Pt reports self-blame, strong feelings such as fear horror or anger, los of interest in things she used to enjoy, some self-isolation, difficulty experiencing positive emotion, irritability, diffiuclty concentrating, and difficulty sleeping.       History from Medical Record:        Diagnosis Date    Abnormal Pap smear of cervix     ADHD (attention deficit hyperactivity disorder)     Anxiety     Depression     Fibromyalgia     Hyperlipidemia     Obesity     Unspecified sleep apnea     UTI (urinary tract infection)      Medications:   Current Outpatient Medications   Medication Sig Dispense Refill    pantoprazole (PROTONIX) 40 MG tablet take 1 tablet by mouth once daily 90 tablet 0    busPIRone (BUSPAR) 7.5 MG tablet take 1 tablet by mouth twice a day if needed for anxiety 60 tablet 0    sertraline (ZOLOFT) 100 MG tablet Take 1.5 tablets by mouth daily 45 tablet 0    hydrOXYzine (ATARAX) 10 MG tablet Take 1 tablet by mouth 3 times daily as needed for Anxiety 90 tablet 2    traZODone (DESYREL) 50 MG tablet Take 0.5 tablets by mouth nightly as needed for Sleep 15 tablet 2    topiramate (TOPAMAX) 100 MG tablet Take 1 tablet by mouth 2 times daily 60 tablet 3    diclofenac sodium (VOLTAREN) 1 % GEL apply 4 grams topically four times a day AS NEEDED FOR PAIN 500 g 5    amphetamine-dextroamphetamine (ADDERALL) 15 MG tablet take 1 tablet by mouth twice a day 60 tablet 0    levETIRAcetam (KEPPRA) 750 MG tablet take 1 tablet by mouth twice a day      fluticasone (FLONASE) 50 MCG/ACT nasal spray 1 spray by Nasal route daily 16 g 5    cyanocobalamin 1000 MCG/ML injection Inject 1 mL into the muscle every 30 days 1 mL 11    Pantoprazole Sodium (PROTONIX PO) Take by mouth      loratadine (CLARITIN) 10 MG tablet take 1 tablet by mouth once daily if needed 30 tablet 5    Mirabegron ER (MYRBETRIQ) 25 MG TB24 Take 1 tablet by mouth daily 14 tablet 0    IRON PO       Vitamin D (CHOLECALCIFEROL) 1000 UNITS CAPS capsule Take 1,000 Units by mouth daily.  CALCIUM CITRATE PO Take 1,500 mg by mouth daily.  acetaminophen (TYLENOL) 325 MG tablet Take 650 mg by mouth every 6 hours as needed. No current facility-administered medications for this encounter.        Social History:   Social History     Socioeconomic History    Marital status:      Spouse name: Not on file    Number of children: Not on file    Years of education: Not on file    Highest education level: Not on file   Occupational History    Not on file   Tobacco Use    Smoking status: Former Smoker     Packs/day: 0.00     Years: 1.00     Pack years: 0.00     Quit date: 1994     Years since quittin.5    Smokeless tobacco: Never Used   Substance and Sexual Activity    Alcohol use: No    Drug use: No    Sexual activity: Not on file   Other Topics Concern    Not on file   Social History Narrative    Not on file     Social Determinants of Health     Financial Resource Strain: Low Risk     Difficulty of Paying Living Expenses: Not hard at all   Food Insecurity: No Food Insecurity    Worried About [address] in the Last Year: Never true    [address] [place] in the Last Year: Never true   Transportation Needs:     Lack of Transportation (Medical): Not on file    Lack of Transportation (Non-Medical): Not on file   Physical Activity:     Days of Exercise per Week: Not on file    Minutes of Exercise per Session: Not on file   Stress:     Feeling of Stress : Not on file   Social Connections:     Frequency of Communication with Friends and Family: Not on file    Frequency of Social Gatherings with Friends and Family: Not on file    Attends Denominational Services: Not on file    Active Member of [address] [place] [address] or Organizations: Not on file    Attends Club or Organization Meetings: Not on file    Marital Status: Not on file   Intimate Partner Violence:     Fear of Current or Ex-Partner: Not on file    Emotionally Abused: Not on file    Physically Abused: Not on file    Sexually Abused: Not on file   Housing Stability:     Unable to Pay for Housing in the Last Year: Not on file    Number of [place] in the Last Year: Not on file    Unstable Housing in the Last Year: Not on file       TOBACCO:   reports that she quit smoking about 27 years ago.  She smoked 0.00 packs per day for 1.00 year. She has never used smokeless tobacco.  ETOH:   reports no history of alcohol use. Family History:   Family History   Problem Relation Age of Onset    Diabetes Mother     High Blood Pressure Mother     Heart Disease Mother     Heart Disease Father     Cancer Father         prostate    Cancer Paternal Grandmother         breast cancer at age 80           Pt interventions:  Provided handout on  Thought log, Trained in relaxation strategies, Trained in improving communication skills, Provided education, Provided education on PTSD symptoms and treatment options for evidence-based treatment (Cognitive Processing Therapy and Prolonged Exposure), Established rapport, Conducted functional assessment, Supportive techniques, Cognitive strategies to target negative thoughts  including i am not worthy and Identified maladaptive thoughts        PLAN:   Continue cognitive distortions   Review Thought Log     Patient scheduled to return on:       Were changes or additions made to the treatment plan today?   YES []   NO [x]  Noted changes:

## 2022-04-07 ENCOUNTER — HOSPITAL ENCOUNTER (OUTPATIENT)
Dept: PSYCHIATRY | Age: 52
Setting detail: THERAPIES SERIES
Discharge: HOME | End: 2022-04-07
Payer: COMMERCIAL

## 2022-04-07 PROCEDURE — 90837 PSYTX W PT 60 MINUTES: CPT | Performed by: SOCIAL WORKER

## 2022-04-08 NOTE — PSYCHOTHERAPY
[facility] Therapy Note in [facility], [address]   4/7/2022  5:00 PM  Gonsalo Castillo  1970  [id number]    Time spent with Patient: 60 minutes      Pt was provided informed consent for the [address] [facility]. Discussed with patient model of service to include the limits of confidentiality (i.e. abuse reporting, suicide intervention, etc.) and short-term intervention focused approach. Pt indicated understanding. S:  Pt and therapist took time at the beginning of session is to process work and frustrations from the last few weeks, as pt presented in a low mood. 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 Pt and therapist also processed the benefits and emotional costs of work. Pt and therapist also discussed whether pt feels like she could stay in her current job until intermediate and processed thoughts and feelings. Pt and therapist also discussed implementing more self-care and coping skills. Pt reported that she did not complete the thought log, but will complete it for next week. Therapist and pt used cognitive techniques including socratic dialogue to address thoughts and feelings. Therapist and pt discussed on pt's feelings about low self-worth and her definition of \"matering. \"  Therapist and pt discussed caring what others think, and pt's tendencies to share her mistakes with people but not her accomplishments or needs. Therapist and pt briefly processed her newfound anger at her M and will continue to process it next week.         O:  MSE:     Appearance    alert, cooperative, crying, mild distress  Appetite normal  Sleep disturbance No  Fatigue Yes  Loss of pleasure No  Impulsive behavior No  Speech    normal rate, normal volume and well articulated  Mood    Depressed  Low self-esteem  Affect    depressed affect  Thought Content    worthlessness, cognitive distortions and all or nothing thinking  Thought Process    linear, goal directed and coherent  Associations    logical connections  Insight    Good  Judgment    Intact  Orientation    oriented to person, place, time, and general circumstances  Memory    recent and remote memory intact  Attention/Concentration    intact  Morbid ideation No  Suicide Assessment    no suicidal ideation    A:  Pt presented to session in a low mood, as tired, and slightly depressed. Pt's mood improved slightly as session progressed and pt processed difficulties from week. Pt forgot about her homework and will complete thought log for next week. Pt used some negative self-talk in session but caught herself and replaced negative words with positive ones. Pt also displayed that she is experiencing some level of low self-worth and that she does not feel she matters as much as everyone else. Pt displayed insight by identifying that she feels she needs to earn her worth while others are born with it. Visit Diagnosis:   Post Traumatic Stress Disorder -Pt is reporting intrusive and arousal symptoms.  Pt is experiencing repeated dreams, memories, and flashbacks of the stressful experience.  Pt is avoiding memories and external reminders.  Pt has strong physical and emotional reactions to these exposures.  Pt reports strong negative beliefs about herself.  Pt reports self-blame, strong feelings such as fear horror or anger, los of interest in things she used to enjoy, some self-isolation, difficulty experiencing positive emotion, irritability, diffiuclty concentrating, and difficulty sleeping.        History from Medical Record:        Diagnosis Date    Abnormal Pap smear of cervix     ADHD (attention deficit hyperactivity disorder)     Anxiety     Depression     Fibromyalgia     Hyperlipidemia     Obesity     Unspecified sleep apnea     UTI (urinary tract infection)      Medications:   Current Outpatient Medications   Medication Sig Dispense Refill  pantoprazole (PROTONIX) 40 MG tablet take 1 tablet by mouth once daily 90 tablet 0    busPIRone (BUSPAR) 7.5 MG tablet take 1 tablet by mouth twice a day if needed for anxiety 60 tablet 0    sertraline (ZOLOFT) 100 MG tablet Take 1.5 tablets by mouth daily 45 tablet 0    hydrOXYzine (ATARAX) 10 MG tablet Take 1 tablet by mouth 3 times daily as needed for Anxiety 90 tablet 2    traZODone (DESYREL) 50 MG tablet Take 0.5 tablets by mouth nightly as needed for Sleep 15 tablet 2    topiramate (TOPAMAX) 100 MG tablet Take 1 tablet by mouth 2 times daily 60 tablet 3    diclofenac sodium (VOLTAREN) 1 % GEL apply 4 grams topically four times a day AS NEEDED FOR PAIN 500 g 5    amphetamine-dextroamphetamine (ADDERALL) 15 MG tablet take 1 tablet by mouth twice a day 60 tablet 0    levETIRAcetam (KEPPRA) 750 MG tablet take 1 tablet by mouth twice a day      fluticasone (FLONASE) 50 MCG/ACT nasal spray 1 spray by Nasal route daily 16 g 5    cyanocobalamin 1000 MCG/ML injection Inject 1 mL into the muscle every 30 days 1 mL 11    Pantoprazole Sodium (PROTONIX PO) Take by mouth      loratadine (CLARITIN) 10 MG tablet take 1 tablet by mouth once daily if needed 30 tablet 5    Mirabegron ER (MYRBETRIQ) 25 MG TB24 Take 1 tablet by mouth daily 14 tablet 0    IRON PO       Vitamin D (CHOLECALCIFEROL) 1000 UNITS CAPS capsule Take 1,000 Units by mouth daily.  CALCIUM CITRATE PO Take 1,500 mg by mouth daily.  acetaminophen (TYLENOL) 325 MG tablet Take 650 mg by mouth every 6 hours as needed. No current facility-administered medications for this encounter.        Social History:   Social History     Socioeconomic History    Marital status:      Spouse name: Not on file    Number of children: Not on file    Years of education: Not on file    Highest education level: Not on file   Occupational History    Not on file   Tobacco Use    Smoking status: Former Smoker     Packs/day: 0.00     Years: Disease Father     Cancer Father         prostate    Cancer Paternal Grandmother         breast cancer at age 80           Pt interventions:  Trained in improving communication skills, Provided education, Discussed self-care (sleep, nutrition, rewarding activities, social support, exercise), Provided education on PTSD symptoms and treatment options for evidence-based treatment (Cognitive Processing Therapy and Prolonged Exposure), Discussed potential barriers to change, Established rapport, [place]-setting to identify pt's primary goals for [facility] visit / overall health, Supportive techniques, Emphasized self-care as important for managing overall health, CBT to target distortions, Cognitive strategies to target negative thoughts  including \"I dont matter\" and Identified maladaptive thoughts        PLAN:   Discuss anger at M  Review thought log  Cognitive distortions       Patient scheduled to return on:   Thursday 4/14/2022 at 5:00 PM    Were changes or additions made to the treatment plan today?   YES []   NO [x]  Noted changes:

## 2022-04-14 ENCOUNTER — HOSPITAL ENCOUNTER (OUTPATIENT)
Dept: PSYCHIATRY | Age: 52
Setting detail: THERAPIES SERIES
Discharge: HOME | End: 2022-04-14
Payer: COMMERCIAL

## 2022-04-14 PROCEDURE — 90837 PSYTX W PT 60 MINUTES: CPT | Performed by: SOCIAL WORKER

## 2022-04-15 NOTE — PSYCHOTHERAPY
[facility] Therapy Note in [facility] [address], [address]   4/14/2022  5:00 PM  Shadi Marlon  1970  [id number]    Time spent with Patient: 60 minutes      Pt was provided informed consent for the [address] [place]. Discussed with patient model of service to include the limits of confidentiality (i.e. abuse reporting, suicide intervention, etc.) and short-term intervention focused approach. Pt indicated understanding. S:  Pt and therapist processed pt's thoughts from the week using pt's thought log. Therapist helped pt to make connections between thoughts and feelings. Therapist offered psychoeducation on cognitive distortions, trauma, and reframing thinking patterns. Therapist and pt processed pt's frustrations with work and engaged in positive psychology techniques to identify what pt's \"dreams\" are. Pt struggled to identify her dreams as she felt she could never have them due to a negative thought of never achieving them. Therapist led pt in socratic dialogue to help pt identify times she has achieved some of her dreams. Pt and therapist engaged in other cognitive techniques to identify and process difficult thoughts about work and how it contributes to pt's core belief of worthlessness.       O:  MSE:     Appearance    alert, cooperative, mild distress  Appetite normal  Sleep disturbance No  Fatigue Yes  Loss of pleasure No  Impulsive behavior No  Speech    rapid and loud  Mood    Anxious  Affect    agitation  Thought Content    worthlessness, excessive guilt, cognitive distortions and all or nothing thinking  Thought Process    rapid and overabundance of ideas  Associations    logical connections  Insight    Good  Judgment    Intact  Orientation    oriented to person, place, time, and general circumstances  Memory    recent and remote memory intact  Attention/Concentration    intact  Morbid ideation No  Suicide Assessment    no suicidal ideation    A:  Pt presented to session as slightly dysregulated and agitated. Pt was tearful throughout and pt would often use self-depricating jokes to cover up negative thoughts or difficult emotions. Pt's thoughts appeared scattered and needed redirection to return to topic. Pt appears to need to process things out loud as it does not appear she has a support person to listen about her daily experiences. Pt uses humor often to cope. Pt has a basic understanding of trauma reactions and how to reframe thoughts. Pt is going out of town and will be doing virtual sessions for the next two weeks. Visit Diagnosis:   Post Traumatic Stress Disorder -Pt is reporting intrusive and arousal symptoms.  Pt is experiencing repeated dreams, memories, and flashbacks of the stressful experience.  Pt is avoiding memories and external reminders.  Pt has strong physical and emotional reactions to these exposures.  Pt reports strong negative beliefs about herself.  Pt reports self-blame, strong feelings such as fear horror or anger, los of interest in things she used to enjoy, some self-isolation, difficulty experiencing positive emotion, irritability, diffiuclty concentrating, and difficulty sleeping.     History from Medical Record:        Diagnosis Date    Abnormal Pap smear of cervix     ADHD (attention deficit hyperactivity disorder)     Anxiety     Depression     Fibromyalgia     Hyperlipidemia     Obesity     Unspecified sleep apnea     UTI (urinary tract infection)      Medications:   Current Outpatient Medications   Medication Sig Dispense Refill    pantoprazole (PROTONIX) 40 MG tablet take 1 tablet by mouth once daily 90 tablet 0    busPIRone (BUSPAR) 7.5 MG tablet take 1 tablet by mouth twice a day if needed for anxiety 60 tablet 0    sertraline (ZOLOFT) 100 MG tablet Take 1.5 tablets by mouth daily 45 tablet 0    hydrOXYzine (ATARAX) 10 MG tablet Take 1 tablet by mouth 3 times daily as needed for Anxiety 90 tablet 2    traZODone (DESYREL) 50 MG tablet Take 0.5 tablets by mouth nightly as needed for Sleep 15 tablet 2    topiramate (TOPAMAX) 100 MG tablet Take 1 tablet by mouth 2 times daily 60 tablet 3    diclofenac sodium (VOLTAREN) 1 % GEL apply 4 grams topically four times a day AS NEEDED FOR PAIN 500 g 5    amphetamine-dextroamphetamine (ADDERALL) 15 MG tablet take 1 tablet by mouth twice a day 60 tablet 0    levETIRAcetam (KEPPRA) 750 MG tablet take 1 tablet by mouth twice a day      fluticasone (FLONASE) 50 MCG/ACT nasal spray 1 spray by Nasal route daily 16 g 5    cyanocobalamin 1000 MCG/ML injection Inject 1 mL into the muscle every 30 days 1 mL 11    Pantoprazole Sodium (PROTONIX PO) Take by mouth      loratadine (CLARITIN) 10 MG tablet take 1 tablet by mouth once daily if needed 30 tablet 5    Mirabegron ER (MYRBETRIQ) 25 MG TB24 Take 1 tablet by mouth daily 14 tablet 0    IRON PO       Vitamin D (CHOLECALCIFEROL) 1000 UNITS CAPS capsule Take 1,000 Units by mouth daily.  CALCIUM CITRATE PO Take 1,500 mg by mouth daily.  acetaminophen (TYLENOL) 325 MG tablet Take 650 mg by mouth every 6 hours as needed. No current facility-administered medications for this encounter.        Social History:   Social History     Socioeconomic History    Marital status:      Spouse name: Not on file    Number of children: Not on file    Years of education: Not on file    Highest education level: Not on file   Occupational History    Not on file   Tobacco Use    Smoking status: Former Smoker     Packs/day: 0.00     Years: 1.00     Pack years: 0.00     Quit date: 1994     Years since quittin.6    Smokeless tobacco: Never Used   Substance and Sexual Activity    Alcohol use: No    Drug use: No    Sexual activity: Not on file   Other Topics Concern    Not on file   Social History Narrative    Not on file     Social Determinants of Health     Financial Resource Strain: Low Risk     Difficulty of Paying Living Expenses: Not hard at all   Food Insecurity: No Food Insecurity    Worried About [facility] in the Last Year: Never true    Che[employer] of Food in the Last Year: Never true   Transportation Needs:     Lack of Transportation (Medical): Not on file    Lack of Transportation (Non-Medical): Not on file   Physical Activity:     Days of Exercise per Week: Not on file    Minutes of Exercise per Session: Not on file   Stress:     Feeling of Stress : Not on file   Social Connections:     Frequency of Communication with Friends and Family: Not on file    Frequency of Social Gatherings with Friends and Family: Not on file    Attends [employer] Services: Not on file    Active Member of [address] [place] [address] or Organizations: Not on file    Attends Club or Organization Meetings: Not on file    Marital Status: Not on file   Intimate Partner Violence:     Fear of Current or Ex-Partner: Not on file    Emotionally Abused: Not on file    Physically Abused: Not on file    Sexually Abused: Not on file   Housing Stability:     Unable to Pay for Housing in the Last Year: Not on file    Number of [place] in the Last Year: Not on file    Unstable Housing in the Last Year: Not on file       TOBACCO:   reports that she quit smoking about 27 years ago. She smoked 0.00 packs per day for 1.00 year. She has never used smokeless tobacco.  ETOH:   reports no history of alcohol use.     Family History:   Family History   Problem Relation Age of Onset    Diabetes Mother     High Blood Pressure Mother     Heart Disease Mother     Heart Disease Father     Cancer Father         prostate    Cancer Paternal Grandmother         breast cancer at age 80           Pt interventions:  Practiced assertive communication, Trained in improving communication skills, Provided education on PTSD symptoms and treatment options for evidence-based treatment (Cognitive Processing Therapy and Prolonged Exposure), Established rapport, Supportive techniques, Provided Psychoeducation re: thinking errors, CBT to target negative thoughts , Cognitive strategies to target distortions  including im worthless and Identified maladaptive thoughts        PLAN:   Continue with identifying thinking errors  Social support     Patient scheduled to return on:   Thursday 4/21/2022 via doxy    Were changes or additions made to the treatment plan today?   YES []   NO [x]  Noted changes:

## 2022-05-12 ENCOUNTER — HOSPITAL ENCOUNTER (OUTPATIENT)
Dept: PSYCHIATRY | Age: 52
Setting detail: THERAPIES SERIES
Discharge: HOME | End: 2022-05-12
Payer: COMMERCIAL

## 2022-05-12 PROCEDURE — 90837 PSYTX W PT 60 MINUTES: CPT | Performed by: SOCIAL WORKER

## 2022-05-13 NOTE — PSYCHOTHERAPY
[facility] Therapy Note in [facility] [address], [address]   5/12/2022  5:00 PM  Shadi Mason  1970  [id number]    Time spent with Patient: 60 minutes      Pt was provided informed consent for the [address] [facility]. Discussed with patient model of service to include the limits of confidentiality (i.e. abuse reporting, suicide intervention, etc.) and short-term intervention focused approach. Pt indicated understanding. S:  Pt presented to therapy and therapist and pt engaged in check in to review pt's last few weeks. Therapist gave pt space to ventilate as she was experiencing stress and frustration. Therapist utilized active listening and offered emotional support to pt while helping her process her experiences and identify thoughts and feelings. Therapist offered pt positive regard and praise when pt identified that she had been able to identify thinking errors and change her thinking patterns. Pt and therapist processed pt's trip for her nephew's wedding and identified frequent negative thinking patterns. Pt identified that she feels like her family is getting smaller and feels like she's mourning that. Therapist gave pt space to feel those emotions and express them. Pt also identified a lot of hurt associated with her sister. Therapist then introduced pt to possibility of doing EMDR. Pt had initially sought out EMDR at intake and would like to engage in it now that therapist is trained. Therapist provided pt with brochure and offered psychoeducation on EMDR. Therapist explained target planning process and pt identified first negative cognition of \"I'm safe\" and opposite positive thought of \"I am safe\"  Pt began to identify possible targets. Therapist led pt in closure and deep breathing to promote calmness and pt expressed that she felt relaxed before she left. Therapist also gave pt flier for support group.         O:  MSE:     Appearance    alert, mild distress  Appetite normal  Sleep disturbance Yes  Fatigue Yes  Loss of pleasure No  Impulsive behavior No  Speech    rapid and loud  Mood    Anxious  Affect    anxiety  Thought Content    worthlessness, cognitive distortions and all or nothing thinking  Thought Process    overabundance of ideas  Associations    logical connections  Insight    Good  Judgment    Intact  Orientation    oriented to person, place, time, and general circumstances  Memory    recent and remote memory intact  Attention/Concentration    impaired  Morbid ideation No  Suicide Assessment    no suicidal ideation    A:  Pt presented to session as anxious and in need of processing. Pt had not attended therapy in 3 weeks due to scheduling conflicts. Pt presented as needing to ventilate for part of session and appeared more relaxed after expressing thoughts and feelings about her vacation. Pt was able to reframe some thoughts while on her trip. Pt expressed interest in EMDR now that therapist is trained. Pt would benefit from EMDR to address her history of trauma and frequent negative thoughts of \"I'm not safe\" and \"I'm worthless. \"  Pt was able begin target planning but did express some emotional dysregulation. Therapist to work on resourcing with pt before continuing target planning. Pt did deep breathing before leaving session and experienced calmness and closure. Visit Diagnosis:   Post Traumatic Stress Disorder -Pt is reporting intrusive and arousal symptoms.  Pt is experiencing repeated dreams, memories, and flashbacks of the stressful experience.  Pt is avoiding memories and external reminders.  Pt has strong physical and emotional reactions to these exposures.  Pt reports strong negative beliefs about herself.  Pt reports self-blame, strong feelings such as fear horror or anger, los of interest in things she used to enjoy, some self-isolation, difficulty experiencing positive emotion, irritability, diffiuclty concentrating, and difficulty sleeping. History from Medical Record:        Diagnosis Date    Abnormal Pap smear of cervix     ADHD (attention deficit hyperactivity disorder)     Anxiety     Depression     Fibromyalgia     Hyperlipidemia     Obesity     Unspecified sleep apnea     UTI (urinary tract infection)      Medications:   Current Outpatient Medications   Medication Sig Dispense Refill    traMADol (ULTRAM) 50 MG tablet take 1 tablet by mouth every 6 hours if needed for pain FOR UP TO 7 DAYS 40 tablet 0    cyanocobalamin 1000 MCG/ML injection inject 1 milliliter ( 1000 MCG ) intramuscularly EVERY 30 DAYS 1 mL 11    pantoprazole (PROTONIX) 40 MG tablet take 1 tablet by mouth once daily 90 tablet 0    busPIRone (BUSPAR) 7.5 MG tablet take 1 tablet by mouth twice a day if needed for anxiety 60 tablet 0    sertraline (ZOLOFT) 100 MG tablet Take 1.5 tablets by mouth daily 45 tablet 0    hydrOXYzine (ATARAX) 10 MG tablet Take 1 tablet by mouth 3 times daily as needed for Anxiety 90 tablet 2    traZODone (DESYREL) 50 MG tablet Take 0.5 tablets by mouth nightly as needed for Sleep 15 tablet 2    topiramate (TOPAMAX) 100 MG tablet Take 1 tablet by mouth 2 times daily 60 tablet 3    diclofenac sodium (VOLTAREN) 1 % GEL apply 4 grams topically four times a day AS NEEDED FOR PAIN 500 g 5    amphetamine-dextroamphetamine (ADDERALL) 15 MG tablet take 1 tablet by mouth twice a day 60 tablet 0    levETIRAcetam (KEPPRA) 750 MG tablet take 1 tablet by mouth twice a day      fluticasone (FLONASE) 50 MCG/ACT nasal spray 1 spray by Nasal route daily 16 g 5    Pantoprazole Sodium (PROTONIX PO) Take by mouth      loratadine (CLARITIN) 10 MG tablet take 1 tablet by mouth once daily if needed 30 tablet 5    Mirabegron ER (MYRBETRIQ) 25 MG TB24 Take 1 tablet by mouth daily 14 tablet 0    IRON PO       Vitamin D (CHOLECALCIFEROL) 1000 UNITS CAPS capsule Take 1,000 Units by mouth daily.       CALCIUM CITRATE PO Take 1,500 mg by mouth daily.  acetaminophen (TYLENOL) 325 MG tablet Take 650 mg by mouth every 6 hours as needed. No current facility-administered medications for this encounter. Social History:   Social History     Socioeconomic History    Marital status:      Spouse name: Not on file    Number of children: Not on file    Years of education: Not on file    Highest education level: Not on file   Occupational History    Not on file   Tobacco Use    Smoking status: Former Smoker     Packs/day: 0.00     Years: 1.00     Pack years: 0.00     Quit date: 1994     Years since quittin.7    Smokeless tobacco: Never Used   Substance and Sexual Activity    Alcohol use: No    Drug use: No    Sexual activity: Not on file   Other Topics Concern    Not on file   Social History Narrative    Not on file     Social Determinants of Health     Financial Resource Strain: Low Risk     Difficulty of Paying Living Expenses: Not hard at all   Food Insecurity: No Food Insecurity    Worried About [address] in the Last Year: Never true    [address] [employer] in the Last Year: Never true   Transportation Needs:     Lack of Transportation (Medical): Not on file    Lack of Transportation (Non-Medical):  Not on file   Physical Activity:     Days of Exercise per Week: Not on file    Minutes of Exercise per Session: Not on file   Stress:     Feeling of Stress : Not on file   Social Connections:     Frequency of Communication with Friends and Family: Not on file    Frequency of Social Gatherings with Friends and Family: Not on file    Attends Spiritism Services: Not on file    Active Member of Clubs or Organizations: Not on file    Attends Club or Organization Meetings: Not on file    Marital Status: Not on file   Intimate Partner Violence:     Fear of Current or Ex-Partner: Not on file    Emotionally Abused: Not on file    Physically Abused: Not on file    Sexually Abused: Not on file   Housing Stability:     Unable to Pay for Housing in the Last Year: Not on file    Number of Places Lived in the Last Year: Not on file    Unstable Housing in the Last Year: Not on file       TOBACCO:   reports that she quit smoking about 27 years ago. She smoked 0.00 packs per day for 1.00 year. She has never used smokeless tobacco.  ETOH:   reports no history of alcohol use. Family History:   Family History   Problem Relation Age of Onset    Diabetes Mother     High Blood Pressure Mother     Heart Disease Mother     Heart Disease Father     Cancer Father         prostate    Cancer Paternal Grandmother         breast cancer at age 80           Pt interventions:  Provided handout on  EMDR, Trained in relaxation strategies, Discussed potential treatments for  PTSD, Discussed self-care (sleep, nutrition, rewarding activities, social support, exercise), Provided education on PTSD symptoms and treatment options for evidence-based treatment (Cognitive Processing Therapy and Prolonged Exposure), Established rapport, [place]-setting to identify pt's primary goals for [facility] visit / overall health, Supportive techniques, Provided Psychoeducation re: EMDR and Identified maladaptive thoughts        PLAN:   Continue to introduce EMDR  Resourcing   Target Plan (if time allows)      Patient scheduled to return on:   Thursday 5/19/2022 at 5 PM     Were changes or additions made to the treatment plan today?   YES [x]   NO []  Noted changes: Engage in EMDR

## 2022-05-19 ENCOUNTER — HOSPITAL ENCOUNTER (OUTPATIENT)
Dept: PSYCHIATRY | Age: 52
Setting detail: THERAPIES SERIES
Discharge: HOME | End: 2022-05-19
Payer: COMMERCIAL

## 2022-05-19 PROCEDURE — 90837 PSYTX W PT 60 MINUTES: CPT | Performed by: SOCIAL WORKER

## 2022-05-20 NOTE — PSYCHOTHERAPY
[facility] Therapy Note in [facility], [address]   5/19/2022  5:00 PM  Roshan Naomi  1970  [id number]    Time spent with Patient: 60 minutes      Pt was provided informed consent for the [address] [facility]. Discussed with patient model of service to include the limits of confidentiality (i.e. abuse reporting, suicide intervention, etc.) and short-term intervention focused approach. Pt indicated understanding. S:  Therapist and pt engaged in check in and therapist offered emotional support and utilzed active listening to help pt to process thoughts and feelings. Pt expressed distress over a reading she received from a medium that put doubts in her head about her . Therapist led pt in observing and recognizing cognitive distortions including catastrophizing and addressing them using cognitive restructuring. Therapist also introduced EMDR resourcing and led pt in developing her safe/secure space which pt identified as Omid Mon[place]. \"  Therapist led pt in practicing using [facility] and introduced slow Bilateral stimulation (BLS). Therapist also taught and practiced the darren/butterfly breathing to pt. Therapist set agenda for next session which will include additional resourcing and possible target planning.         O:  MSE:     Appearance    alert, cooperative, mild distress  Appetite normal  Sleep disturbance No  Fatigue No  Loss of pleasure No  Impulsive behavior No  Speech    spontaneous, normal volume and rapid  Mood    Anxious  Affect    anxiety  Thought Content    worthlessness, intrusive thoughts, cognitive distortions and all or nothing thinking  Thought Process    rapid and overabundance of ideas  Associations    logical connections  Insight    Good  Judgment    Intact  Orientation    oriented to person, place, time, and general circumstances  Memory    recent and remote memory intact  Attention/Concentration    impaired  Morbid ideation No  Suicide Assessment    no suicidal ideation    A:  Pt presented to session as stressed and agitated. Pt required redirection and guidance to stay on topic and displayed some paranoia about her 's fidelity after receiving a reading from a medium. Pt's mood stabilized after focusing on facts and processing thoughts. Pt displayed multiple cognitive distortions and was able to identify them with therapist's help. Pt then displayed a stable level of calmness and relaxation after learning her safe/secure place and butterfly breathing. Pt would benefit from continued work on resourcing before beginning target planning. Visit Diagnosis:   Post Traumatic Stress Disorder -Pt is reporting intrusive and arousal symptoms.  Pt is experiencing repeated dreams, memories, and flashbacks of the stressful experience.  Pt is avoiding memories and external reminders.  Pt has strong physical and emotional reactions to these exposures.  Pt reports strong negative beliefs about herself.  Pt reports self-blame, strong feelings such as fear horror or anger, los of interest in things she used to enjoy, some self-isolation, difficulty experiencing positive emotion, irritability, diffiuclty concentrating, and difficulty sleeping.         History from Medical Record:        Diagnosis Date    Abnormal Pap smear of cervix     ADHD (attention deficit hyperactivity disorder)     Anxiety     Depression     Fibromyalgia     Hyperlipidemia     Obesity     Unspecified sleep apnea     UTI (urinary tract infection)      Medications:   Current Outpatient Medications   Medication Sig Dispense Refill    traMADol (ULTRAM) 50 MG tablet take 1 tablet by mouth every 6 hours if needed for pain FOR UP TO 7 DAYS 40 tablet 0    cyanocobalamin 1000 MCG/ML injection inject 1 milliliter ( 1000 MCG ) intramuscularly EVERY 30 DAYS 1 mL 11    pantoprazole (PROTONIX) 40 MG tablet take 1 tablet by mouth once daily 90 tablet 0    busPIRone (BUSPAR) 7.5 MG tablet take 1 tablet by mouth twice a day if needed for anxiety 60 tablet 0    sertraline (ZOLOFT) 100 MG tablet Take 1.5 tablets by mouth daily 45 tablet 0    hydrOXYzine (ATARAX) 10 MG tablet Take 1 tablet by mouth 3 times daily as needed for Anxiety 90 tablet 2    traZODone (DESYREL) 50 MG tablet Take 0.5 tablets by mouth nightly as needed for Sleep 15 tablet 2    topiramate (TOPAMAX) 100 MG tablet Take 1 tablet by mouth 2 times daily 60 tablet 3    diclofenac sodium (VOLTAREN) 1 % GEL apply 4 grams topically four times a day AS NEEDED FOR PAIN 500 g 5    amphetamine-dextroamphetamine (ADDERALL) 15 MG tablet take 1 tablet by mouth twice a day 60 tablet 0    levETIRAcetam (KEPPRA) 750 MG tablet take 1 tablet by mouth twice a day      fluticasone (FLONASE) 50 MCG/ACT nasal spray 1 spray by Nasal route daily 16 g 5    Pantoprazole Sodium (PROTONIX PO) Take by mouth      loratadine (CLARITIN) 10 MG tablet take 1 tablet by mouth once daily if needed 30 tablet 5    Mirabegron ER (MYRBETRIQ) 25 MG TB24 Take 1 tablet by mouth daily 14 tablet 0    IRON PO       Vitamin D (CHOLECALCIFEROL) 1000 UNITS CAPS capsule Take 1,000 Units by mouth daily.  CALCIUM CITRATE PO Take 1,500 mg by mouth daily.  acetaminophen (TYLENOL) 325 MG tablet Take 650 mg by mouth every 6 hours as needed. No current facility-administered medications for this encounter.        Social History:   Social History     Socioeconomic History    Marital status:      Spouse name: Not on file    Number of children: Not on file    Years of education: Not on file    Highest education level: Not on file   Occupational History    Not on file   Tobacco Use    Smoking status: Former Smoker     Packs/day: 0.00     Years: 1.00     Pack years: 0.00     Quit date: 1994     Years since quittin.7    Smokeless tobacco: Never Used   Substance and Sexual Activity    Alcohol use: No    Drug use: No    Sexual activity: Not on file   Other Topics Concern    Not on file   Social History Narrative    Not on file     Social Determinants of Health     Financial Resource Strain: Low Risk     Difficulty of Paying Living Expenses: Not hard at all   Food Insecurity: No Food Insecurity    Worried About Running Out of Food in the Last Year: Never true    [address] in the Last Year: Never true   Transportation Needs:     Lack of Transportation (Medical): Not on file    Lack of Transportation (Non-Medical): Not on file   Physical Activity:     Days of Exercise per Week: Not on file    Minutes of Exercise per Session: Not on file   Stress:     Feeling of Stress : Not on file   Social Connections:     Frequency of Communication with Friends and Family: Not on file    Frequency of Social Gatherings with Friends and Family: Not on file    Attends Mandaeism Services: Not on file    Active Member of [address] [place] [address] [employer] or Organizations: Not on file    Attends Club or Organization Meetings: Not on file    Marital Status: Not on file   Intimate Partner Violence:     Fear of Current or Ex-Partner: Not on file    Emotionally Abused: Not on file    Physically Abused: Not on file    Sexually Abused: Not on file   Housing Stability:     Unable to Pay for Housing in the Last Year: Not on file    Number of [place] in the Last Year: Not on file    Unstable Housing in the Last Year: Not on file       TOBACCO:   reports that she quit smoking about 27 years ago. She smoked 0.00 packs per day for 1.00 year. She has never used smokeless tobacco.  ETOH:   reports no history of alcohol use.     Family History:   Family History   Problem Relation Age of Onset    Diabetes Mother     High Blood Pressure Mother     Heart Disease Mother     Heart Disease Father     Cancer Father         prostate    Cancer Paternal Grandmother         breast cancer at age 80           Pt interventions:  Trained in strategies for increasing balanced thinking, Trained in relaxation strategies, Discussed use of imagery, distractions, relaxation, mood management, communication training, questioning unhelpful thinking, problem-solving, and behavioral activation to manage pain, Established rapport, Supportive techniques, Cognitive strategies to target distorted thinking including catastrophizing, Identified maladaptive thoughts, Explained relaxed breathing technique in detail and practiced this with pt in visit and Emphasized importance of regular practice of relaxation strategies to target / promote coping skills        PLAN:   Resourcing (Container)   Target Planning if time allows     Patient scheduled to return on:   Thursday 5/26/2022 at 5 PM    Were changes or additions made to the treatment plan today?   YES []   NO [x]  Noted changes:

## 2022-05-26 ENCOUNTER — HOSPITAL ENCOUNTER (OUTPATIENT)
Dept: PSYCHIATRY | Age: 52
Setting detail: THERAPIES SERIES
Discharge: HOME | End: 2022-05-26
Payer: COMMERCIAL

## 2022-05-26 PROCEDURE — 90837 PSYTX W PT 60 MINUTES: CPT | Performed by: SOCIAL WORKER

## 2022-05-26 NOTE — PSYCHOTHERAPY
[facility] Therapy Note in [facility], [address]   5/26/2022  5:00 PM  Clovis Romero   1970  [id number]    Time spent with Patient: 60 minutes      Pt was provided informed consent for the [address] [facility]. Discussed with patient model of service to include the limits of confidentiality (i.e. abuse reporting, suicide intervention, etc.) and short-term intervention focused approach. Pt indicated understanding. S:  Pt and therapist engaged in a check in to process stressors from pt's week and allow her to [place] out\" emotions. Therapist and pt identified some maladaptive thinking patterns. Therapist and pt reviewed pt's use of EMDR Resourcing (safe place) and reviewed her [facility]. Therapist and pt also reviewed times during the week where she didn't allow herself to internalize things and \"let them go. \"  Therapist led pt in more development of resources and pt created, practiced, and implemented her container. Pt also identified a possible theme in her thinking of loneliness. O:  MSE:     Appearance    alert, cooperative, mild distress  Appetite normal  Sleep disturbance Yes  Fatigue Yes  Loss of pleasure No  Impulsive behavior No  Speech    spontaneous, normal volume and rapid  Mood    Anxious  Affect    anxiety  Thought Content    worthlessness, intrusive thoughts, cognitive distortions and all or nothing thinking  Thought Process    linear, goal directed and coherent  Associations    logical connections  Insight    Good  Judgment    Intact  Orientation    oriented to person, place, time, and general circumstances  Memory    recent and remote memory intact  Attention/Concentration    impaired  Morbid ideation No  Suicide Assessment    no suicidal ideation    A:  Pt presented to session as slightly anxious and scattered, but appeared calmer after expressing her emotions.   Pt identified that she did not use her \"[place]\" as much but was able to use other coping skills. Pt expressed understanding of importance of daily practice and will practice coping skills and breathing each day. Pt created her container and identified that she felt calmer and more relaxed at end of session after practicing. Pt and therapist will review container and possibly begin target planning next week. Visit Diagnosis:   Post Traumatic Stress Disorder -Pt is reporting intrusive and arousal symptoms.  Pt is experiencing repeated dreams, memories, and flashbacks of the stressful experience.  Pt is avoiding memories and external reminders. Pt has strong physical and emotional reactions to these exposures.  Pt reports strong negative beliefs about herself.  Pt reports self-blame, strong feelings such as fear horror or anger, los of interest in things she used to enjoy, some self-isolation, difficulty experiencing positive emotion, irritability, diffiuclty concentrating, and difficulty sleeping.     History from Medical Record:        Diagnosis Date    Abnormal Pap smear of cervix     ADHD (attention deficit hyperactivity disorder)     Anxiety     Depression     Fibromyalgia     Hyperlipidemia     Obesity     Unspecified sleep apnea     UTI (urinary tract infection)      Medications:   Current Outpatient Medications   Medication Sig Dispense Refill    traMADol (ULTRAM) 50 MG tablet take 1 tablet by mouth every 6 hours if needed for pain FOR UP TO 7 DAYS 40 tablet 0    cyanocobalamin 1000 MCG/ML injection inject 1 milliliter ( 1000 MCG ) intramuscularly EVERY 30 DAYS 1 mL 11    pantoprazole (PROTONIX) 40 MG tablet take 1 tablet by mouth once daily 90 tablet 0    busPIRone (BUSPAR) 7.5 MG tablet take 1 tablet by mouth twice a day if needed for anxiety 60 tablet 0    sertraline (ZOLOFT) 100 MG tablet Take 1.5 tablets by mouth daily 45 tablet 0    hydrOXYzine (ATARAX) 10 MG tablet Take 1 tablet by mouth 3 times daily as needed for Anxiety 90 tablet 2    traZODone (DESYREL) 50 MG tablet Take 0.5 tablets by mouth nightly as needed for Sleep 15 tablet 2    topiramate (TOPAMAX) 100 MG tablet Take 1 tablet by mouth 2 times daily 60 tablet 3    diclofenac sodium (VOLTAREN) 1 % GEL apply 4 grams topically four times a day AS NEEDED FOR PAIN 500 g 5    amphetamine-dextroamphetamine (ADDERALL) 15 MG tablet take 1 tablet by mouth twice a day 60 tablet 0    levETIRAcetam (KEPPRA) 750 MG tablet take 1 tablet by mouth twice a day      fluticasone (FLONASE) 50 MCG/ACT nasal spray 1 spray by Nasal route daily 16 g 5    Pantoprazole Sodium (PROTONIX PO) Take by mouth      loratadine (CLARITIN) 10 MG tablet take 1 tablet by mouth once daily if needed 30 tablet 5    Mirabegron ER (MYRBETRIQ) 25 MG TB24 Take 1 tablet by mouth daily 14 tablet 0    IRON PO       Vitamin D (CHOLECALCIFEROL) 1000 UNITS CAPS capsule Take 1,000 Units by mouth daily.  CALCIUM CITRATE PO Take 1,500 mg by mouth daily.  acetaminophen (TYLENOL) 325 MG tablet Take 650 mg by mouth every 6 hours as needed. No current facility-administered medications for this encounter.        Social History:   Social History     Socioeconomic History    Marital status:      Spouse name: Not on file    Number of children: Not on file    Years of education: Not on file    Highest education level: Not on file   Occupational History    Not on file   Tobacco Use    Smoking status: Former Smoker     Packs/day: 0.00     Years: 1.00     Pack years: 0.00     Quit date: 1994     Years since quittin.7    Smokeless tobacco: Never Used   Substance and Sexual Activity    Alcohol use: No    Drug use: No    Sexual activity: Not on file   Other Topics Concern    Not on file   Social History Narrative    Not on file     Social Determinants of Health     Financial Resource Strain: Low Risk     Difficulty of Paying Living Expenses: Not hard at all   Food Insecurity: No Food Insecurity    Worried About Running Out of Food in the Last Year: Never true    [address] in the Last Year: Never true   Transportation Needs:     Lack of Transportation (Medical): Not on file    Lack of Transportation (Non-Medical): Not on file   Physical Activity:     Days of Exercise per Week: Not on file    Minutes of Exercise per Session: Not on file   Stress:     Feeling of Stress : Not on file   Social Connections:     Frequency of Communication with Friends and Family: Not on file    Frequency of Social Gatherings with Friends and Family: Not on file    Attends Spiritism Services: Not on file    Active Member of [address] [place] [address] [employer] or Organizations: Not on file    Attends Club or Organization Meetings: Not on file    Marital Status: Not on file   Intimate Partner Violence:     Fear of Current or Ex-Partner: Not on file    Emotionally Abused: Not on file    Physically Abused: Not on file    Sexually Abused: Not on file   Housing Stability:     Unable to Pay for Housing in the Last Year: Not on file    Number of [place] in the Last Year: Not on file    Unstable Housing in the Last Year: Not on file       TOBACCO:   reports that she quit smoking about 27 years ago. She smoked 0.00 packs per day for 1.00 year. She has never used smokeless tobacco.  ETOH:   reports no history of alcohol use.     Family History:   Family History   Problem Relation Age of Onset    Diabetes Mother     High Blood Pressure Mother     Heart Disease Mother     Heart Disease Father     Cancer Father         prostate    Cancer Paternal Grandmother         breast cancer at age 80           Pt interventions:  Practiced assertive communication, Trained in relaxation strategies, Trained in improving communication skills, Discussed self-care (sleep, nutrition, rewarding activities, social support, exercise), Provided education on PTSD symptoms and treatment options for evidence-based treatment (Cognitive Processing Therapy and Prolonged Exposure), Discussed potential barriers to change, Discussed use of imagery, distractions, relaxation, mood management, communication training, questioning unhelpful thinking, problem-solving, and behavioral activation to manage pain, Established rapport, Conducted functional assessment, Supportive techniques, Identified maladaptive thoughts, Explained relaxed breathing technique in detail and practiced this with pt in visit and Emphasized importance of regular practice of relaxation strategies to target / promote expansion of toleance window        PLAN:   Review resources  Target Planning       Patient scheduled to return on:   Thursday 6/2/2022 at 5 PM    Were changes or additions made to the treatment plan today?   YES []   NO [x]  Noted changes:

## 2022-06-02 ENCOUNTER — HOSPITAL ENCOUNTER (OUTPATIENT)
Dept: PSYCHIATRY | Age: 52
Setting detail: THERAPIES SERIES
Discharge: HOME | End: 2022-06-02
Payer: COMMERCIAL

## 2022-06-02 PROCEDURE — 90834 PSYTX W PT 45 MINUTES: CPT | Performed by: SOCIAL WORKER

## 2022-06-03 NOTE — PSYCHOTHERAPY
[facility] Therapy Note in [facility], [address]   6/2/2022  5:00 PM  Sharla Marinellilanre  1970 [id number]    Time spent with Patient: 45 minutes      Pt was provided informed consent [facility] the [facility]. Discussed with patient model of service to include the limits of confidentiality (i.e. abuse reporting, suicide intervention, etc.) and short-term intervention focused approach. Pt indicated understanding. S:  Pt and therapist engaged in check in and therapist offered emotional support to pt. Pt and therapist discussed pt's use of cognitive skills outside of session especially when it comes to friends. Therapist addressed pt's questions about her recent increase of eating sweets and therapist assigned pt homework of recording thoughts and feelings when she notices she is eating more sweets. Pt and therapist reviewed pt's use of resourcing and coping skills. Therapist and pt engaged in EMDR target planning to establish negative cognitions and memories to process. Therapist and pt worked to identify connections between her current thinking process and her past.  Pt added the negative thought of \"I'm unimportant\" to her target plan and started picking memories. Therapist ended session with identifying positive cognition opposite to it to allow pt to have closure and leave session calm.           O:  MSE:     Appearance    alert, cooperative, mild distress  Appetite abnormal: Eating sweets more often  Sleep disturbance No  Fatigue Yes  Loss of pleasure Yes  Impulsive behavior No  Speech    normal volume, well articulated and rapid  Mood    Anxious  Depressed  Demoralization  Affect    anxiety  Thought Content    worthlessness, excessive preoccupations, cognitive distortions and all or nothing thinking  Thought Process    linear, goal directed and coherent  Associations    logical connections  Insight    Good  Judgment    Intact  Orientation    oriented to person, place, time, and general circumstances  Memory    recent and remote memory intact  Attention/Concentration    impaired  Morbid ideation No  Suicide Assessment    no suicidal ideation    A:  Pt presented to session as slightly agitated and anxious but grew sad as session progressed and she discussed some memories. Pt appears to be using cognitive techniques outside of therapy in that she does not personalize when a friend is unavailable or automatically assume something is wrong with her. Pt is also able to see moments where she \"matters\" and set boundaries. Pt struggled to stay focused on one topic but was able to continue target planning with help of redirection. Through exploration, pt was able to identify that one of her core beliefs was \"I'm unimportant\" and it has been a theme for multiple events in her life. Pt was calm and relaxed by end of session as therapist engaged in closure and focused on positives. Visit Diagnosis:   Post Traumatic Stress Disorder -Pt is reporting intrusive and arousal symptoms.  Pt is experiencing repeated dreams, memories, and flashbacks of the stressful experience.  Pt is avoiding memories and external reminders. Pt has strong physical and emotional reactions to these exposures.  Pt reports strong negative beliefs about herself.  Pt reports self-blame, strong feelings such as fear horror or anger, los of interest in things she used to enjoy, some self-isolation, difficulty experiencing positive emotion, irritability, diffiuclty concentrating, and difficulty sleeping.       History from Medical Record:        Diagnosis Date    Abnormal Pap smear of cervix     ADHD (attention deficit hyperactivity disorder)     Anxiety     Depression     Fibromyalgia     Hyperlipidemia     Obesity     Unspecified sleep apnea     UTI (urinary tract infection)      Medications:   Current Outpatient Medications   Medication Sig Dispense Refill    traMADol (ULTRAM) 50 MG tablet take 1 tablet by mouth every 6 hours if needed for pain FOR UP TO 7 DAYS 40 tablet 0    cyanocobalamin 1000 MCG/ML injection inject 1 milliliter ( 1000 MCG ) intramuscularly EVERY 30 DAYS 1 mL 11    pantoprazole (PROTONIX) 40 MG tablet take 1 tablet by mouth once daily 90 tablet 0    busPIRone (BUSPAR) 7.5 MG tablet take 1 tablet by mouth twice a day if needed for anxiety 60 tablet 0    sertraline (ZOLOFT) 100 MG tablet Take 1.5 tablets by mouth daily 45 tablet 0    hydrOXYzine (ATARAX) 10 MG tablet Take 1 tablet by mouth 3 times daily as needed for Anxiety 90 tablet 2    traZODone (DESYREL) 50 MG tablet Take 0.5 tablets by mouth nightly as needed for Sleep 15 tablet 2    topiramate (TOPAMAX) 100 MG tablet Take 1 tablet by mouth 2 times daily 60 tablet 3    diclofenac sodium (VOLTAREN) 1 % GEL apply 4 grams topically four times a day AS NEEDED FOR PAIN 500 g 5    amphetamine-dextroamphetamine (ADDERALL) 15 MG tablet take 1 tablet by mouth twice a day 60 tablet 0    levETIRAcetam (KEPPRA) 750 MG tablet take 1 tablet by mouth twice a day      fluticasone (FLONASE) 50 MCG/ACT nasal spray 1 spray by Nasal route daily 16 g 5    Pantoprazole Sodium (PROTONIX PO) Take by mouth      loratadine (CLARITIN) 10 MG tablet take 1 tablet by mouth once daily if needed 30 tablet 5    Mirabegron ER (MYRBETRIQ) 25 MG TB24 Take 1 tablet by mouth daily 14 tablet 0    IRON PO       Vitamin D (CHOLECALCIFEROL) 1000 UNITS CAPS capsule Take 1,000 Units by mouth daily.  CALCIUM CITRATE PO Take 1,500 mg by mouth daily.  acetaminophen (TYLENOL) 325 MG tablet Take 650 mg by mouth every 6 hours as needed. No current facility-administered medications for this encounter.        Social History:   Social History     Socioeconomic History    Marital status:      Spouse name: Not on file    Number of children: Not on file    Years of education: Not on file    Highest education level: Not on file   Occupational History    Not on file   Tobacco Use    Smoking status: Former Smoker     Packs/day: 0.00     Years: 1.00     Pack years: 0.00     Quit date: 1994     Years since quittin.7    Smokeless tobacco: Never Used   Substance and Sexual Activity    Alcohol use: No    Drug use: No    Sexual activity: Not on file   Other Topics Concern    Not on file   Social History Narrative    Not on file     Social Determinants of Health     Financial Resource Strain: Low Risk     Difficulty of Paying Living Expenses: Not hard at all   Food Insecurity: No Food Insecurity    Worried About [facility] in the Last Year: Never true    [facility] in the Last Year: Never true   Transportation Needs:     Lack of Transportation (Medical): Not on file    Lack of Transportation (Non-Medical): Not on file   Physical Activity:     Days of Exercise per Week: Not on file    Minutes of Exercise per Session: Not on file   Stress:     Feeling of Stress : Not on file   Social Connections:     Frequency of Communication with Friends and Family: Not on file    Frequency of Social Gatherings with Friends and Family: Not on file    Attends Confucianist Services: Not on file    Active Member of [address] [place] [address] or Organizations: Not on file    Attends Club or Organization Meetings: Not on file    Marital Status: Not on file   Intimate Partner Violence:     Fear of Current or Ex-Partner: Not on file    Emotionally Abused: Not on file    Physically Abused: Not on file    Sexually Abused: Not on file   Housing Stability:     Unable to Pay for Housing in the Last Year: Not on file    Number of [place] in the Last Year: Not on file    Unstable Housing in the Last Year: Not on file       TOBACCO:   reports that she quit smoking about 27 years ago. She smoked 0.00 packs per day for 1.00 year. She has never used smokeless tobacco.  ETOH:   reports no history of alcohol use.     Family History:   Family History   Problem Relation Age of Onset    Diabetes Mother     High Blood Pressure Mother     Heart Disease Mother     Heart Disease Father     Cancer Father         prostate    Cancer Paternal Grandmother         breast cancer at age 80           Pt interventions:  Trained in relaxation strategies, Provided education on PTSD symptoms and treatment options for evidence-based treatment (Cognitive Processing Therapy and Prolonged Exposure), Established rapport, Conducted functional assessment, [place]-setting to identify pt's primary goals for [facility] visit / overall health, Supportive techniques and Identified maladaptive thoughts EMDR target planning         PLAN:   Continue target planning       Patient scheduled to return on:   Thursday 6/9/2022 at 5 PM    Were changes or additions made to the treatment plan today?   YES []   NO [x]  Noted changes:

## 2022-06-09 ENCOUNTER — HOSPITAL ENCOUNTER (OUTPATIENT)
Dept: PSYCHIATRY | Age: 52
Setting detail: THERAPIES SERIES
Discharge: HOME | End: 2022-06-09
Payer: COMMERCIAL

## 2022-06-09 PROCEDURE — 90837 PSYTX W PT 60 MINUTES: CPT | Performed by: SOCIAL WORKER

## 2022-06-10 NOTE — PSYCHOTHERAPY
[facility], [address]   6/09/2022  5:00 PM  Sharla Marinellilanre  1970  [id number]    Time spent with Patient: 60 minutes      Pt was provided informed consent for the [address] [facility]. Discussed with patient model of service to include the limits of confidentiality (i.e. abuse reporting, suicide intervention, etc.) and short-term intervention focused approach. Pt indicated understanding. S:  Therapist met with pt and spent much of session de-escalating pt and offering emotional support to stabilize pt's mood. Therapist engaged in emotional validation and helped pt cope with her difficult day and intense emotions. Therapist engaged in some cognitive techniques to help pt reframe some of her cognitive distortions. Therapist offered psychoeducation on resilience and trauma bonding and helped pt explore her views on her own worth. Therapist did not engage in target planning as pt needed session to stabilize and regulate. Therapist and pt discussed control and pt's \"toxic\" job. Therapist led pt in resourcing to close session and feel more stabilized before leaving.         O:  MSE:     Appearance    crying, moderate distress  Appetite normal  Sleep disturbance Yes  Fatigue Yes  Loss of pleasure Yes  Impulsive behavior No  Speech    rapid and pressured  Mood    Anxious  Depressed  Worthless  Demoralization  Affect    inappropriate  Thought Content    hopelessness, worthlessness, intrusive thoughts, cognitive distortions and all or nothing thinking  Thought Process    tangential  Associations    tangential connections  Insight    Fair  Judgment    Impaired  Orientation    oriented to person, place, time, and general circumstances  Memory    recent and remote memory intact  Attention/Concentration    impaired  Morbid ideation No  Suicide Assessment    no suicidal ideation    A:  Pt presented to session experiencing intense emotions that frequently made her cry.  Pt displayed some inappropriate affect and would laugh to cope with difficult situations. Pt had rapid and pressured speech and tangential thought process. Therapist struggled to interrupt pt and pt often would go off on tangents before answering question. Pt's mood was varied but mainly sad. Pt was not in an emotional state to engage in EMDR target planning today and needed support and regulation. Pt appeared hopeless but denied suicidal ideation. Pt would benefit from continued resourcing. Visit Diagnosis:   Post Traumatic Stress Disorder -Pt is reporting intrusive and arousal symptoms.  Pt is experiencing repeated dreams, memories, and flashbacks of the stressful experience.  Pt is avoiding memories and external reminders. Pt has strong physical and emotional reactions to these exposures.  Pt reports strong negative beliefs about herself.  Pt reports self-blame, strong feelings such as fear horror or anger, los of interest in things she used to enjoy, some self-isolation, difficulty experiencing positive emotion, irritability, diffiuclty concentrating, and difficulty sleeping.       History from Medical Record:        Diagnosis Date    Abnormal Pap smear of cervix     ADHD (attention deficit hyperactivity disorder)     Anxiety     Depression     Fibromyalgia     Hyperlipidemia     Obesity     Unspecified sleep apnea     UTI (urinary tract infection)      Medications:   Current Outpatient Medications   Medication Sig Dispense Refill    cyanocobalamin 1000 MCG/ML injection inject 1 milliliter ( 1000 MCG ) intramuscularly EVERY 30 DAYS 1 mL 11    pantoprazole (PROTONIX) 40 MG tablet take 1 tablet by mouth once daily 90 tablet 0    busPIRone (BUSPAR) 7.5 MG tablet take 1 tablet by mouth twice a day if needed for anxiety 60 tablet 0    sertraline (ZOLOFT) 100 MG tablet Take 1.5 tablets by mouth daily 45 tablet 0    hydrOXYzine (ATARAX) 10 MG tablet Take 1 tablet by mouth 3 times daily as needed for Anxiety 90 tablet 2    traZODone (DESYREL) 50 MG tablet Take 0.5 tablets by mouth nightly as needed for Sleep 15 tablet 2    topiramate (TOPAMAX) 100 MG tablet Take 1 tablet by mouth 2 times daily 60 tablet 3    diclofenac sodium (VOLTAREN) 1 % GEL apply 4 grams topically four times a day AS NEEDED FOR PAIN 500 g 5    amphetamine-dextroamphetamine (ADDERALL) 15 MG tablet take 1 tablet by mouth twice a day 60 tablet 0    levETIRAcetam (KEPPRA) 750 MG tablet take 1 tablet by mouth twice a day      fluticasone (FLONASE) 50 MCG/ACT nasal spray 1 spray by Nasal route daily 16 g 5    Pantoprazole Sodium (PROTONIX PO) Take by mouth      loratadine (CLARITIN) 10 MG tablet take 1 tablet by mouth once daily if needed 30 tablet 5    Mirabegron ER (MYRBETRIQ) 25 MG TB24 Take 1 tablet by mouth daily 14 tablet 0    IRON PO       Vitamin D (CHOLECALCIFEROL) 1000 UNITS CAPS capsule Take 1,000 Units by mouth daily.  CALCIUM CITRATE PO Take 1,500 mg by mouth daily.  acetaminophen (TYLENOL) 325 MG tablet Take 650 mg by mouth every 6 hours as needed. No current facility-administered medications for this encounter.        Social History:   Social History     Socioeconomic History    Marital status:      Spouse name: Not on file    Number of children: Not on file    Years of education: Not on file    Highest education level: Not on file   Occupational History    Not on file   Tobacco Use    Smoking status: Former Smoker     Packs/day: 0.00     Years: 1.00     Pack years: 0.00     Quit date: 1994     Years since quittin.7    Smokeless tobacco: Never Used   Substance and Sexual Activity    Alcohol use: No    Drug use: No    Sexual activity: Not on file   Other Topics Concern    Not on file   Social History Narrative    Not on file     Social Determinants of Health     Financial Resource Strain: Low Risk     Difficulty of Paying Living Expenses: Not hard at all   Food Insecurity: No Food Insecurity    Worried About Running Out of Food in the Last Year: Never true    Ran Out of Food in the Last Year: Never true   Transportation Needs:     Lack of Transportation (Medical): Not on file    Lack of Transportation (Non-Medical): Not on file   Physical Activity:     Days of Exercise per Week: Not on file    Minutes of Exercise per Session: Not on file   Stress:     Feeling of Stress : Not on file   Social Connections:     Frequency of Communication with Friends and Family: Not on file    Frequency of Social Gatherings with Friends and Family: Not on file    Attends Restorationist Services: Not on file    Active Member of [address] [place] [address] [employer] or Organizations: Not on file    Attends Club or Organization Meetings: Not on file    Marital Status: Not on file   Intimate Partner Violence:     Fear of Current or Ex-Partner: Not on file    Emotionally Abused: Not on file    Physically Abused: Not on file    Sexually Abused: Not on file   Housing Stability:     Unable to Pay for Housing in the Last Year: Not on file    Number of [place] in the Last Year: Not on file    Unstable Housing in the Last Year: Not on file       TOBACCO:   reports that she quit smoking about 27 years ago. She smoked 0.00 packs per day for 1.00 year. She has never used smokeless tobacco.  ETOH:   reports no history of alcohol use.     Family History:   Family History   Problem Relation Age of Onset    Diabetes Mother     High Blood Pressure Mother     Heart Disease Mother     Heart Disease Father     Cancer Father         prostate    Cancer Paternal Grandmother         breast cancer at age 80           Pt interventions:  Trained in relaxation strategies, Provided education, Established rapport, Supportive techniques, Explained relaxed breathing technique in detail and practiced this with pt in visit and Emphasized importance of regular practice of relaxation strategies to target / promote regulation PLAN:   Resourcing  Possible target planning     Patient scheduled to return on:   Thursdays at 5:15 PM  Thursday 6/16/2022 at 5:15 PM    Were changes or additions made to the treatment plan today?   YES []   NO [x]  Noted changes:

## 2022-06-16 ENCOUNTER — HOSPITAL ENCOUNTER (OUTPATIENT)
Dept: PSYCHIATRY | Age: 52
Setting detail: THERAPIES SERIES
Discharge: HOME | End: 2022-06-16
Payer: COMMERCIAL

## 2022-06-16 PROCEDURE — 90837 PSYTX W PT 60 MINUTES: CPT | Performed by: SOCIAL WORKER

## 2022-06-17 NOTE — PSYCHOTHERAPY
[facility] Therapy Note in Person  Chris Montanez   6/16/2022  5:15 PM  Alexis Barry  1970  [id number]    Time spent with Patient: 60 minutes      Pt was provided informed consent for the [facility]. Discussed with patient model of service to include the limits of confidentiality (i.e. abuse reporting, suicide intervention, etc.) and short-term intervention focused approach. Pt indicated understanding. S:  Pt and therapist met for session and engaged in a check in for the first 15 minutes of session so pt could externally process her week. Therapist and pt then engaged in EMDR target planning. Therapist and pt worked on the negative belief target cluster of \"I'm unimportant. \"  Therapist and pt also developed two new clusters of \"I don't matter\" and \"There's something wrong with me. \"  Therapist offered emotional support to pt to help her regulate while exploring difficult memories. Therapist led pt through closure at end of session using her container and peaceful place. O:  MSE:     Appearance    alert, cooperative, crying, healthy, mild distress  Appetite normal  Sleep disturbance No  Fatigue No   Loss of pleasure No  Impulsive behavior No  Speech    normal rate, normal volume and well articulated  Mood    Normal  Affect    normal affect  Thought Content    worthlessness and all or nothing thinking  Thought Process    linear, goal directed and coherent  Associations    logical connections  Insight    Good  Judgment    Intact  Orientation    oriented to person, place, time, and general circumstances  Memory    recent memory intact, remote memory intact  Attention/Concentration    intact  Morbid ideation No  Suicide Assessment    no suicidal ideation    A:  Pt presented to session in a regulated mood and was able to identify positives from her week. Pt reports being able to reframe some of her thoughts and see the positives in situations.   Pt's speech and thought process was more linear in today's session than in previous sessions. Pt was able to stay on topic and redirected herself when needed. Pt was able to answer questions and focus on current intervention and continued to add to her target plan. Pt displayed good insight in that she was able to identify negative beliefs about herself. Pt became tearful a few times during session but was regulated and calm as she left. Visit Diagnosis:   Post Traumatic Stress Disorder -Pt is reporting intrusive and arousal symptoms.  Pt is experiencing repeated dreams, memories, and flashbacks of the stressful experience.  Pt is avoiding memories and external reminders. Pt has strong physical and emotional reactions to these exposures.  Pt reports strong negative beliefs about herself.  Pt reports self-blame, strong feelings such as fear horror or anger, los of interest in things she used to enjoy, some self-isolation, difficulty experiencing positive emotion, irritability, diffiuclty concentrating, and difficulty sleeping.       History from Medical Record:        Diagnosis Date    Abnormal Pap smear of cervix     ADHD (attention deficit hyperactivity disorder)     Anxiety     Depression     Fibromyalgia     Hyperlipidemia     Obesity     Unspecified sleep apnea     UTI (urinary tract infection)      Medications:   Current Outpatient Medications   Medication Sig Dispense Refill    sertraline (ZOLOFT) 100 MG tablet Take 1.5 tablets by mouth daily 45 tablet 5    cyanocobalamin 1000 MCG/ML injection inject 1 milliliter ( 1000 MCG ) intramuscularly EVERY 30 DAYS 1 mL 11    pantoprazole (PROTONIX) 40 MG tablet take 1 tablet by mouth once daily 90 tablet 0    busPIRone (BUSPAR) 7.5 MG tablet take 1 tablet by mouth twice a day if needed for anxiety 60 tablet 0    hydrOXYzine (ATARAX) 10 MG tablet Take 1 tablet by mouth 3 times daily as needed for Anxiety 90 tablet 2    traZODone (DESYREL) 50 MG tablet Take 0.5 tablets by mouth nightly as needed for Sleep 15 tablet 2    topiramate (TOPAMAX) 100 MG tablet Take 1 tablet by mouth 2 times daily 60 tablet 3    diclofenac sodium (VOLTAREN) 1 % GEL apply 4 grams topically four times a day AS NEEDED FOR PAIN 500 g 5    amphetamine-dextroamphetamine (ADDERALL) 15 MG tablet take 1 tablet by mouth twice a day 60 tablet 0    levETIRAcetam (KEPPRA) 750 MG tablet take 1 tablet by mouth twice a day      fluticasone (FLONASE) 50 MCG/ACT nasal spray 1 spray by Nasal route daily 16 g 5    Pantoprazole Sodium (PROTONIX PO) Take by mouth      loratadine (CLARITIN) 10 MG tablet take 1 tablet by mouth once daily if needed 30 tablet 5    Mirabegron ER (MYRBETRIQ) 25 MG TB24 Take 1 tablet by mouth daily 14 tablet 0    IRON PO       Vitamin D (CHOLECALCIFEROL) 1000 UNITS CAPS capsule Take 1,000 Units by mouth daily.  CALCIUM CITRATE PO Take 1,500 mg by mouth daily.  acetaminophen (TYLENOL) 325 MG tablet Take 650 mg by mouth every 6 hours as needed. No current facility-administered medications for this encounter.        Social History:   Social History     Socioeconomic History    Marital status:      Spouse name: Not on file    Number of children: Not on file    Years of education: Not on file    Highest education level: Not on file   Occupational History    Not on file   Tobacco Use    Smoking status: Former Smoker     Packs/day: 0.00     Years: 1.00     Pack years: 0.00     Quit date: 1994     Years since quittin.8    Smokeless tobacco: Never Used   Substance and Sexual Activity    Alcohol use: No    Drug use: No    Sexual activity: Not on file   Other Topics Concern    Not on file   Social History Narrative    Not on file     Social Determinants of Health     Financial Resource Strain: Low Risk     Difficulty of Paying Living Expenses: Not hard at all   Food Insecurity: No Food Insecurity    Worried About [address] in the Last Year: Never true    Ran Out of Food in the Last Year: Never true   Transportation Needs:     Lack of Transportation (Medical): Not on file    Lack of Transportation (Non-Medical): Not on file   Physical Activity:     Days of Exercise per Week: Not on file    Minutes of Exercise per Session: Not on file   Stress:     Feeling of Stress : Not on file   Social Connections:     Frequency of Communication with Friends and Family: Not on file    Frequency of Social Gatherings with Friends and Family: Not on file    Attends Jewish Services: Not on file    Active Member of [address] [place] [address] [employer] or Organizations: Not on file    Attends Club or Organization Meetings: Not on file    Marital Status: Not on file   Intimate Partner Violence:     Fear of Current or Ex-Partner: Not on file    Emotionally Abused: Not on file    Physically Abused: Not on file    Sexually Abused: Not on file   Housing Stability:     Unable to Pay for Housing in the Last Year: Not on file    Number of [place] in the Last Year: Not on file    Unstable Housing in the Last Year: Not on file       TOBACCO:   reports that she quit smoking about 27 years ago. She smoked 0.00 packs per day for 1.00 year. She has never used smokeless tobacco.  ETOH:   reports no history of alcohol use.     Family History:   Family History   Problem Relation Age of Onset    Diabetes Mother     High Blood Pressure Mother     Heart Disease Mother     Heart Disease Father     Cancer Father         prostate    Cancer Paternal Grandmother         breast cancer at age 80           Pt interventions:  Trained in relaxation strategies, Provided education, Established rapport, Conducted functional assessment, [place]-setting to identify pt's primary goals for [facility] visit / overall health, Supportive techniques and Identified maladaptive thoughts, EMDR target planning        PLAN:   Target planning       Patient scheduled to return on:   TBD as pt may be out of town next week.  Next scheduled is Thursday 6/30/22 at 5:15 PM    Were changes or additions made to the treatment plan today?   YES []   NO [x]  Noted changes:

## 2022-06-30 ENCOUNTER — HOSPITAL ENCOUNTER (OUTPATIENT)
Dept: PSYCHIATRY | Age: 52
Setting detail: THERAPIES SERIES
Discharge: HOME | End: 2022-06-30
Payer: COMMERCIAL

## 2022-06-30 PROCEDURE — 90837 PSYTX W PT 60 MINUTES: CPT | Performed by: SOCIAL WORKER
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
## 2022-07-07 ENCOUNTER — HOSPITAL ENCOUNTER (OUTPATIENT)
Dept: PSYCHIATRY | Age: 52
Setting detail: THERAPIES SERIES
Discharge: HOME | End: 2022-07-07
Payer: COMMERCIAL

## 2022-07-07 PROCEDURE — 90837 PSYTX W PT 60 MINUTES: CPT | Performed by: SOCIAL WORKER
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
## 2022-07-14 ENCOUNTER — HOSPITAL ENCOUNTER (OUTPATIENT)
Dept: PSYCHIATRY | Age: 52
Setting detail: THERAPIES SERIES
Discharge: HOME | End: 2022-07-14
Payer: COMMERCIAL

## 2022-07-14 PROCEDURE — 90837 PSYTX W PT 60 MINUTES: CPT | Performed by: SOCIAL WORKER
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
## 2022-07-28 ENCOUNTER — HOSPITAL ENCOUNTER (OUTPATIENT)
Dept: PSYCHIATRY | Age: 52
Setting detail: THERAPIES SERIES
Discharge: HOME | End: 2022-07-28
Payer: COMMERCIAL

## 2022-07-28 ENCOUNTER — CLINICAL DOCUMENTATION (OUTPATIENT)
Dept: PSYCHIATRY | Age: 52
End: 2022-07-28

## 2022-07-28 PROCEDURE — 90837 PSYTX W PT 60 MINUTES: CPT | Performed by: SOCIAL WORKER

## 2022-07-29 NOTE — PSYCHOTHERAPY
TELEHEALTH EVALUATION -- Audio/Visual (During WEFGK-68 public health emergency)     [facility] Therapy Note  Hunter Stoner FRANCA   7/28/2022  5:15 PM  Viraj Suefrida  1970  [id number]    Patient location is at their home address. Location of clinician is at [facility] located at [address] [place] [address]. Time spent with Patient: 60 minutes      Pt was provided informed consent for the [facility]. Discussed with patient model of service to include the limits of confidentiality (i.e. abuse reporting, suicide intervention, etc.) and short-term intervention focused approach. Pt indicated understanding. S:  Pt and therapist engaged in check-in and processed pt's last two weeks. Therapist utilized cognitive techniques and reviewed pt's progress. Pt identified that she has noticed less personalization and has been able to have positive interactions with both her daughter and difficult doctors. Therapist and pt engaged in re-evaluation of last target, pt identified that the JAYA remained low and the VOC remained high. Therapist and pt identified a new target and engaged in EMDR therapy techniques. EMDR Treatment     Negative Belief: I don't Matter  Positive Belief:  I'm appreciated   Target: Walking into pt procedure room and noticed it wasn't stocked, and had to set it up herself  Past/Present/Future  Initial VOC: 1  Emotions: Frustrated, angry   Initial JAYA: 10  Body Location: tension in neck, sighs    Outcome:  Unfinished  Ending JAYA: 2  Ending VOC: 3  Clear Body Scan? N/A  Closure: Container   Client Stability: Good     Other Notes:  Pt processed somatically and was able to reduce distress surrounding incident and increase VOC a few points. Pt did not engage in body scan as VOC was improved slightly.              O:  MSE:     Appearance    alert, cooperative  Appetite normal  Sleep disturbance No  Fatigue No  Loss of pleasure No  Impulsive behavior No  Speech    pressured  Mood    Normal  Affect    normal affect  Thought Content    intact  Thought Process    tangential  Associations    logical connections  Insight    Good  Judgment    Intact  Orientation    oriented to person, place, time, and general circumstances  Memory    recent and remote memory intact  Attention/Concentration    intact  Morbid ideation No  Suicide Assessment    no suicidal ideation    A:  Pt presented to session in a positive mood and affect and was able to identify a reduction in use of cognitive distortions. Pt high slightly pressured and tangential speech and therapist struggled to keep pt focused on subject or task at hand. Pt also required explanation of VOC rating multiple times, but appeared to have expressed understanding. Through EMDR processing pt was able to bring her JAYA down from a 10 to a 2, and increased VOC from a 1 to a 3. Visit Diagnosis:   Post Traumatic Stress Disorder -Pt is reporting intrusive and arousal symptoms. Pt is experiencing repeated dreams, memories, and flashbacks of the stressful experience. Pt is avoiding memories and external reminders. Pt has strong physical and emotional reactions to these exposures. Pt reports strong negative beliefs about herself.   Pt reports self-blame, strong feelings such as fear horror or anger, los of interest in things she used to enjoy, some self-isolation, difficulty experiencing positive emotion, irritability, diffiuclty concentrating, and difficulty sleeping    History from Medical Record:        Diagnosis Date    Abnormal Pap smear of cervix     ADHD (attention deficit hyperactivity disorder)     Anxiety     Depression     Fibromyalgia     Hyperlipidemia     Obesity     Unspecified sleep apnea     UTI (urinary tract infection)      Medications:   Current Outpatient Medications   Medication Sig Dispense Refill    sertraline (ZOLOFT) 100 MG tablet Take 1.5 tablets by mouth daily 45 tablet 5 cyanocobalamin 1000 MCG/ML injection inject 1 milliliter ( 1000 MCG ) intramuscularly EVERY 30 DAYS 1 mL 11    pantoprazole (PROTONIX) 40 MG tablet take 1 tablet by mouth once daily 90 tablet 0    busPIRone (BUSPAR) 7.5 MG tablet take 1 tablet by mouth twice a day if needed for anxiety 60 tablet 0    hydrOXYzine (ATARAX) 10 MG tablet Take 1 tablet by mouth 3 times daily as needed for Anxiety 90 tablet 2    traZODone (DESYREL) 50 MG tablet Take 0.5 tablets by mouth nightly as needed for Sleep 15 tablet 2    topiramate (TOPAMAX) 100 MG tablet Take 1 tablet by mouth 2 times daily 60 tablet 3    diclofenac sodium (VOLTAREN) 1 % GEL apply 4 grams topically four times a day AS NEEDED FOR PAIN 500 g 5    amphetamine-dextroamphetamine (ADDERALL) 15 MG tablet take 1 tablet by mouth twice a day 60 tablet 0    levETIRAcetam (KEPPRA) 750 MG tablet take 1 tablet by mouth twice a day      fluticasone (FLONASE) 50 MCG/ACT nasal spray 1 spray by Nasal route daily 16 g 5    Pantoprazole Sodium (PROTONIX PO) Take by mouth      loratadine (CLARITIN) 10 MG tablet take 1 tablet by mouth once daily if needed 30 tablet 5    Mirabegron ER (MYRBETRIQ) 25 MG TB24 Take 1 tablet by mouth daily 14 tablet 0    IRON PO       Vitamin D (CHOLECALCIFEROL) 1000 UNITS CAPS capsule Take 1,000 Units by mouth daily. CALCIUM CITRATE PO Take 1,500 mg by mouth daily. acetaminophen (TYLENOL) 325 MG tablet Take 650 mg by mouth every 6 hours as needed. No current facility-administered medications for this encounter.        Social History:   Social History     Socioeconomic History    Marital status:      Spouse name: Not on file    Number of children: Not on file    Years of education: Not on file    Highest education level: Not on file   Occupational History    Not on file   Tobacco Use    Smoking status: Former     Packs/day: 0.00     Years: 1.00     Pack years: 0.00     Types: Cigarettes     Quit date: 9/1/1994     Years since quittin.9    Smokeless tobacco: Never   Substance and Sexual Activity    Alcohol use: No    Drug use: No    Sexual activity: Not on file   Other Topics Concern    Not on file   Social History Narrative    Not on file     Social Determinants of Health     Financial Resource Strain: Low Risk     Difficulty of Paying Living Expenses: Not hard at all   Food Insecurity: No Food Insecurity    Worried About Running Out of Food in the Last Year: Never true    Ran Out of Food in the Last Year: Never true   Transportation Needs: Not on file   Physical Activity: Not on file   Stress: Not on file   Social Connections: Not on file   Intimate Partner Violence: Not on file   Housing Stability: Not on file       TOBACCO:   reports that she quit smoking about 27 years ago. She has never used smokeless tobacco.  ETOH:   reports no history of alcohol use. Family History:   Family History   Problem Relation Age of Onset    Diabetes Mother     High Blood Pressure Mother     Heart Disease Mother     Heart Disease Father     Cancer Father         prostate    Cancer Paternal Grandmother         breast cancer at age 80           Pt interventions:  Provided education, Established rapport, Supportive techniques, and Identified maladaptive thoughts, EMDR        PLAN:   Continue EMDR processing with previous target       Patient scheduled to return on:   2022 at 5:15 PM     Were changes or additions made to the treatment plan today? YES []   NO [x]  Noted changes:                Pursuant to the emergency declaration under the [facility], [facility] waiver authority and the [employer] Act, this Virtual Visit was conducted, with patient's consent, to reduce the patient's risk of exposure to COVID-19 and provide continuity of care for an established patient.    Services were provided through a video synchronous discussion virtually to

## 2022-08-04 ENCOUNTER — CLINICAL DOCUMENTATION (OUTPATIENT)
Dept: PSYCHIATRY | Age: 52
End: 2022-08-04

## 2022-08-04 ENCOUNTER — HOSPITAL ENCOUNTER (OUTPATIENT)
Dept: PSYCHIATRY | Age: 52
Setting detail: THERAPIES SERIES
Discharge: HOME | End: 2022-08-04
Payer: COMMERCIAL

## 2022-08-04 PROCEDURE — 90837 PSYTX W PT 60 MINUTES: CPT | Performed by: SOCIAL WORKER

## 2022-08-05 NOTE — PSYCHOTHERAPY
TELEHEALTH EVALUATION -- Audio/Visual (During VVDIK-47 public health emergency)     [facility] Therapy Note  Bg FRANCA Marie   8/4/2022  5:15 PM  Lula Guest  1970  [id number]    Patient location is at their home address. Location of clinician is at [address] [place] [address] located at [address] [place] [address]. Time spent with Patient: 60 minutes      Pt was provided informed consent for the [facility]. Discussed with patient model of service to include the limits of confidentiality (i.e. abuse reporting, suicide intervention, etc.) and short-term intervention focused approach. Pt indicated understanding. S:  Pt and therapist met and engaged in check in. Pt identified that her JAYA from her previous target (room was not stocked) decreased from a 2 to a 0. Pt identified that she is no longer personalizing things at work and that she isn't assuming people think the worst of her. Pt has begun to reframe her thoughts. Therapist and pt then engaged in EMDR to process target about pt's . EMDR Treatment     Negative Belief: There's something wrong with me   Positive Belief: I'm enough  Target:  lost wedding ring and lied about it  Present   Initial VOC: 3  Emotions: betrayal, anger   Initial JAYA: 10  Body Location: Chest    Outcome:  Unfinished  Ending JAYA: 8  Ending VOC: N/A  Clear Body Scan? N/A  Closure: Resources   Client Stability: Good     Other Notes:  Pt has a fear of being alone. Pt is reminded of her ex cheating on her.            O:  MSE:     Appearance    alert, cooperative, mild distress  Appetite normal  Sleep disturbance No  Fatigue Yes  Loss of pleasure No  Impulsive behavior No  Speech    normal volume and pressured  Mood    varied  Affect    normal affect  Thought Content    cognitive distortions  Thought Process    linear, goal directed, and coherent  Associations    logical connections  Insight    Good  Judgment Intact  Orientation    oriented to person, place, time, and general circumstances  Memory    recent and remote memory intact  Attention/Concentration    intact  Morbid ideation No  Suicide Assessment    no suicidal ideation    A:  Pt presented to session as tired but regulated. Pt has been able to reduce personalization and has been able to reframe her thoughts lately. Pt has also been engaging in catastrophizing about her  lately. Pt has been having increased distrust about her 's fidelity, but struggles to identify if it is based on fact or feelings. Pt would benefit from processing her thoughts and feelings through EMDR and cognitive techniques. Visit Diagnosis:   Post Traumatic Stress Disorder -Pt is reporting intrusive and arousal symptoms. Pt is experiencing repeated dreams, memories, and flashbacks of the stressful experience. Pt is avoiding memories and external reminders. Pt has strong physical and emotional reactions to these exposures. Pt reports strong negative beliefs about herself. Pt reports self-blame, strong feelings such as fear horror or anger, los of interest in things she used to enjoy, some self-isolation, difficulty experiencing positive emotion, irritability, diffiuclty concentrating, and difficulty sleeping.       History from Medical Record:        Diagnosis Date    Abnormal Pap smear of cervix     ADHD (attention deficit hyperactivity disorder)     Anxiety     Depression     Fibromyalgia     Hyperlipidemia     Obesity     Unspecified sleep apnea     UTI (urinary tract infection)      Medications:   Current Outpatient Medications   Medication Sig Dispense Refill    sertraline (ZOLOFT) 100 MG tablet Take 1.5 tablets by mouth daily 45 tablet 5    cyanocobalamin 1000 MCG/ML injection inject 1 milliliter ( 1000 MCG ) intramuscularly EVERY 30 DAYS 1 mL 11    pantoprazole (PROTONIX) 40 MG tablet take 1 tablet by mouth once daily 90 tablet 0    busPIRone (BUSPAR) 7.5 MG tablet take 1 tablet by mouth twice a day if needed for anxiety 60 tablet 0    hydrOXYzine (ATARAX) 10 MG tablet Take 1 tablet by mouth 3 times daily as needed for Anxiety 90 tablet 2    traZODone (DESYREL) 50 MG tablet Take 0.5 tablets by mouth nightly as needed for Sleep 15 tablet 2    topiramate (TOPAMAX) 100 MG tablet Take 1 tablet by mouth 2 times daily 60 tablet 3    diclofenac sodium (VOLTAREN) 1 % GEL apply 4 grams topically four times a day AS NEEDED FOR PAIN 500 g 5    amphetamine-dextroamphetamine (ADDERALL) 15 MG tablet take 1 tablet by mouth twice a day 60 tablet 0    levETIRAcetam (KEPPRA) 750 MG tablet take 1 tablet by mouth twice a day      fluticasone (FLONASE) 50 MCG/ACT nasal spray 1 spray by Nasal route daily 16 g 5    Pantoprazole Sodium (PROTONIX PO) Take by mouth      loratadine (CLARITIN) 10 MG tablet take 1 tablet by mouth once daily if needed 30 tablet 5    Mirabegron ER (MYRBETRIQ) 25 MG TB24 Take 1 tablet by mouth daily 14 tablet 0    IRON PO       Vitamin D (CHOLECALCIFEROL) 1000 UNITS CAPS capsule Take 1,000 Units by mouth daily. CALCIUM CITRATE PO Take 1,500 mg by mouth daily. acetaminophen (TYLENOL) 325 MG tablet Take 650 mg by mouth every 6 hours as needed. No current facility-administered medications for this encounter.        Social History:   Social History     Socioeconomic History    Marital status:      Spouse name: Not on file    Number of children: Not on file    Years of education: Not on file    Highest education level: Not on file   Occupational History    Not on file   Tobacco Use    Smoking status: Former     Packs/day: 0.00     Years: 1.00     Pack years: 0.00     Types: Cigarettes     Quit date: 1994     Years since quittin.9    Smokeless tobacco: Never   Substance and Sexual Activity    Alcohol use: No    Drug use: No    Sexual activity: Not on file   Other Topics Concern    Not on file   Social History Narrative    Not on file Social Determinants of Health     Financial Resource Strain: Low Risk     Difficulty of Paying Living Expenses: Not hard at all   Food Insecurity: No Food Insecurity    Worried About Running Out of Food in the Last Year: Never true    Ran Out of Food in the Last Year: Never true   Transportation Needs: Not on file   Physical Activity: Not on file   Stress: Not on file   Social Connections: Not on file   Intimate Partner Violence: Not on file   Housing Stability: Not on file       TOBACCO:   reports that she quit smoking about 27 years ago. She has never used smokeless tobacco.  ETOH:   reports no history of alcohol use. Family History:   Family History   Problem Relation Age of Onset    Diabetes Mother     High Blood Pressure Mother     Heart Disease Mother     Heart Disease Father     Cancer Father         prostate    Cancer Paternal Grandmother         breast cancer at age 80           Pt interventions:  Trained in relaxation strategies, Provided education, Provided education on PTSD symptoms and treatment options for evidence-based treatment (Cognitive Processing Therapy and Prolonged Exposure), Discussed potential barriers to change, Established rapport, Conducted functional assessment, Supportive techniques, and Identified maladaptive thoughts, EMDR        PLAN:   Look at facts vs. Feelings with pt's   EMDR      Patient scheduled to return on:   Thursday 8/11/2022 at 5:15 PM    Were changes or additions made to the treatment plan today? YES []   NO [x]  Noted changes:                Pursuant to the emergency declaration under the [facility], [facility] waiver authority and the [employer] General Act, this Virtual Visit was conducted, with patient's consent, to reduce the patient's risk of exposure to COVID-19 and provide continuity of care for an established patient.    Services were provided through a video synchronous discussion virtually to substitute for in-person clinic visit.

## 2022-08-11 ENCOUNTER — CLINICAL DOCUMENTATION (OUTPATIENT)
Dept: PSYCHIATRY | Age: 52
End: 2022-08-11

## 2022-08-11 ENCOUNTER — HOSPITAL ENCOUNTER (OUTPATIENT)
Dept: PSYCHIATRY | Age: 52
Setting detail: THERAPIES SERIES
Discharge: HOME | End: 2022-08-11
Payer: COMMERCIAL

## 2022-08-11 PROCEDURE — 90837 PSYTX W PT 60 MINUTES: CPT | Performed by: SOCIAL WORKER

## 2022-08-12 NOTE — PSYCHOTHERAPY
[facility] Therapy Note in Person  Owen Dunn, [address]   8/11/2022  5:15 PM  Cassandra Covarrubias  1970  [id number]    Time spent with Patient: 60 minutes      Pt was provided informed consent for the [address] [place] [address]. Discussed with patient model of service to include the limits of confidentiality (i.e. abuse reporting, suicide intervention, etc.) and short-term intervention focused approach. Pt indicated understanding. S:  Pt and therapist engaged in check in and therapist offered pt emotional support and used active listening to help pt identify needs and problem solve. Pt's previous target ended with a 3 but pt did not want to continue with that target. Therapist led pt in EMDR interventions. EMDR Treatment     Negative Belief: I'm stupid   Positive Belief: I'm only human  Target: People at work pointing out her mistakes   Present  Initial VOC: 2  Emotions:sad, alone   Initial JAYA 7  Body Location: trunk    Outcome:  Completed  Ending JAYA: 1  Ending VOC: 7  Clear Body Scan? Yes  Closure: Container  Client Stability Good    Other Notes:    Pt processed mostly somatically and was able to believe the positive thought. O:  MSE:     Appearance    alert, cooperative, mild distress  Appetite normal  Sleep disturbance No  Fatigue No  Loss of pleasure No  Impulsive behavior No  Speech    pressured  Mood    Anxious  Affect    anxiety  Thought Content    cognitive distortions and all or nothing thinking  Thought Process    tangential  Associations    tangential connections  Insight    Good  Judgment    Intact  Orientation    oriented to person, place, time, and general circumstances  Memory    recent and remote memory intact  Attention/Concentration    impaired  Morbid ideation No  Suicide Assessment    no suicidal ideation    A:  Pt presented to session as slightly dysregulated but was able to regulate once she externally processed her thoughts.   Pt's speech was slightly pressured and tangential but therapist was able to redirect. Pt processed her target somatically and allowed the emotions to move through her body. Pt was able to reduce the distress of having mistakes called out and accepted that making mistakes is okay. Visit Diagnosis:   Post Traumatic Stress Disorder -Pt is reporting intrusive and arousal symptoms. Pt is experiencing repeated dreams, memories, and flashbacks of the stressful experience. Pt is avoiding memories and external reminders. Pt has strong physical and emotional reactions to these exposures. Pt reports strong negative beliefs about herself. Pt reports self-blame, strong feelings such as fear horror or anger, los of interest in things she used to enjoy, some self-isolation, difficulty experiencing positive emotion, irritability, diffiuclty concentrating, and difficulty sleeping.     History from Medical Record:        Diagnosis Date    Abnormal Pap smear of cervix     ADHD (attention deficit hyperactivity disorder)     Anxiety     Depression     Fibromyalgia     Hyperlipidemia     Obesity     Unspecified sleep apnea     UTI (urinary tract infection)      Medications:   Current Outpatient Medications   Medication Sig Dispense Refill    sertraline (ZOLOFT) 100 MG tablet Take 1.5 tablets by mouth daily 45 tablet 5    cyanocobalamin 1000 MCG/ML injection inject 1 milliliter ( 1000 MCG ) intramuscularly EVERY 30 DAYS 1 mL 11    pantoprazole (PROTONIX) 40 MG tablet take 1 tablet by mouth once daily 90 tablet 0    busPIRone (BUSPAR) 7.5 MG tablet take 1 tablet by mouth twice a day if needed for anxiety 60 tablet 0    hydrOXYzine (ATARAX) 10 MG tablet Take 1 tablet by mouth 3 times daily as needed for Anxiety 90 tablet 2    traZODone (DESYREL) 50 MG tablet Take 0.5 tablets by mouth nightly as needed for Sleep 15 tablet 2    topiramate (TOPAMAX) 100 MG tablet Take 1 tablet by mouth 2 times daily 60 tablet 3    diclofenac sodium (VOLTAREN) 1 % GEL apply Partner Violence: Not on file   Housing Stability: Not on file       TOBACCO:   reports that she quit smoking about 27 years ago. She has never used smokeless tobacco.  ETOH:   reports no history of alcohol use. Family History:   Family History   Problem Relation Age of Onset    Diabetes Mother     High Blood Pressure Mother     Heart Disease Mother     Heart Disease Father     Cancer Father         prostate    Cancer Paternal Grandmother         breast cancer at age 80           Pt interventions:  Provided education, Established rapport, Supportive techniques, and Identified maladaptive thoughts, EMDR        PLAN:   EMDR       Patient scheduled to return on:   Thursday 8/18/2022 at 5:15 PM    Were changes or additions made to the treatment plan today?   YES []   NO [x]  Noted changes:

## 2022-08-18 ENCOUNTER — CLINICAL DOCUMENTATION (OUTPATIENT)
Dept: PSYCHIATRY | Age: 52
End: 2022-08-18

## 2022-08-22 ENCOUNTER — CLINICAL DOCUMENTATION (OUTPATIENT)
Dept: PSYCHIATRY | Age: 52
End: 2022-08-22

## 2022-08-25 ENCOUNTER — HOSPITAL ENCOUNTER (OUTPATIENT)
Dept: PSYCHIATRY | Age: 52
Setting detail: THERAPIES SERIES
Discharge: HOME | End: 2022-08-25
Payer: COMMERCIAL

## 2022-08-25 PROCEDURE — 90837 PSYTX W PT 60 MINUTES: CPT | Performed by: SOCIAL WORKER
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
## 2022-09-01 ENCOUNTER — HOSPITAL ENCOUNTER (OUTPATIENT)
Dept: PSYCHIATRY | Age: 52
Setting detail: THERAPIES SERIES
Discharge: HOME | End: 2022-09-01
Payer: COMMERCIAL

## 2022-09-01 PROCEDURE — 90837 PSYTX W PT 60 MINUTES: CPT | Performed by: SOCIAL WORKER

## 2022-09-02 NOTE — PROGRESS NOTES
[facility] Therapy Note in Person  [address]   9/1/2022  5:15 PM  Aury Jaqui  1970  [id number]    Time spent with Patient: 60 minutes      Pt was [facility] Discussed with patient model of service to include the limits of confidentiality (i.e. abuse reporting, suicide intervention, etc.) and short-term intervention focused approach. Pt indicated understanding. S:  Pt presented to session and engaged in check in with therapist.  Therapist utilized cognitive strategies to help pt process thoughts and feelings about experiences. Therapist and pt did not engaged in EMDR today in favor of engaging in cognitive strategies to explore pt's relationship with . Therapist and pt discussed themes of intimacy and love, including sexuality. Therapist and pt discussed how pt's trauma and relationship history affect her current relationship with her  and her wants and desires for the future. O:  MSE:     Appearance    alert, cooperative  Appetite normal  Sleep disturbance No  Fatigue Yes  Loss of pleasure No  Impulsive behavior No  Speech    normal rate, normal volume, and well articulated  Mood    Normal  Affect    normal affect  Thought Content    cognitive distortions  Thought Process    linear, goal directed, and coherent  Associations    logical connections  Insight    Good  Judgment    Intact  Orientation    oriented to person, place, time, and general circumstances  Memory    recent and remote memory intact  Attention/Concentration    intact  Morbid ideation No  Suicide Assessment    no suicidal ideation    A:  Pt presented to session with a normal mood and affect. Pt was able to identify times in which she reframed her thoughts and did not personalize situations. Pt also displays a difficulty with fully letting people in, including her .   Pt's trauma hx could be affecting her ability to love and fully trust someone. Pt is interested in developing more intimacy with  that does not involve sex. Visit Diagnosis:   Post Traumatic Stress Disorder -Pt is reporting intrusive and arousal symptoms. Pt is experiencing repeated dreams, memories, and flashbacks of the stressful experience. Pt is avoiding memories and external reminders. Pt has strong physical and emotional reactions to these exposures. Pt reports strong negative beliefs about herself. Pt reports self-blame, strong feelings such as fear horror or anger, los of interest in things she used to enjoy, some self-isolation, difficulty experiencing positive emotion, irritability, diffiuclty concentrating, and difficulty sleeping.       History from Medical Record:        Diagnosis Date    Abnormal Pap smear of cervix     ADHD (attention deficit hyperactivity disorder)     Anxiety     Depression     Fibromyalgia     Hyperlipidemia     Obesity     Unspecified sleep apnea     UTI (urinary tract infection)      Medications:   Current Outpatient Medications   Medication Sig Dispense Refill    sertraline (ZOLOFT) 100 MG tablet Take 1.5 tablets by mouth daily 45 tablet 5    cyanocobalamin 1000 MCG/ML injection inject 1 milliliter ( 1000 MCG ) intramuscularly EVERY 30 DAYS 1 mL 11    pantoprazole (PROTONIX) 40 MG tablet take 1 tablet by mouth once daily 90 tablet 0    busPIRone (BUSPAR) 7.5 MG tablet take 1 tablet by mouth twice a day if needed for anxiety 60 tablet 0    hydrOXYzine (ATARAX) 10 MG tablet Take 1 tablet by mouth 3 times daily as needed for Anxiety 90 tablet 2    traZODone (DESYREL) 50 MG tablet Take 0.5 tablets by mouth nightly as needed for Sleep 15 tablet 2    topiramate (TOPAMAX) 100 MG tablet Take 1 tablet by mouth 2 times daily 60 tablet 3    diclofenac sodium (VOLTAREN) 1 % GEL apply 4 grams topically four times a day AS NEEDED FOR PAIN 500 g 5    amphetamine-dextroamphetamine (ADDERALL) 15 MG tablet take 1 tablet by mouth twice a day 60 tablet 0    levETIRAcetam (KEPPRA) 750 MG tablet take 1 tablet by mouth twice a day      fluticasone (FLONASE) 50 MCG/ACT nasal spray 1 spray by Nasal route daily 16 g 5    Pantoprazole Sodium (PROTONIX PO) Take by mouth      loratadine (CLARITIN) 10 MG tablet take 1 tablet by mouth once daily if needed 30 tablet 5    Mirabegron ER (MYRBETRIQ) 25 MG TB24 Take 1 tablet by mouth daily 14 tablet 0    IRON PO       Vitamin D (CHOLECALCIFEROL) 1000 UNITS CAPS capsule Take 1,000 Units by mouth daily. CALCIUM CITRATE PO Take 1,500 mg by mouth daily. acetaminophen (TYLENOL) 325 MG tablet Take 650 mg by mouth every 6 hours as needed. No current facility-administered medications for this encounter. Social History:   Social History     Socioeconomic History    Marital status:      Spouse name: Not on file    Number of children: Not on file    Years of education: Not on file    Highest education level: Not on file   Occupational History    Not on file   Tobacco Use    Smoking status: Former     Packs/day: 0.00     Years: 1.00     Pack years: 0.00     Types: Cigarettes     Quit date: 1994     Years since quittin.0    Smokeless tobacco: Never   Substance and Sexual Activity    Alcohol use: No    Drug use: No    Sexual activity: Not on file   Other Topics Concern    Not on file   Social History Narrative    Not on file     Social Determinants of Health     Financial Resource Strain: Low Risk     Difficulty of Paying Living Expenses: Not hard at all   Food Insecurity: No Food Insecurity    Worried About Running Out of Food in the Last Year: Never true    Ran Out of Food in the Last Year: Never true   Transportation Needs: Not on file   Physical Activity: Not on file   Stress: Not on file   Social Connections: Not on file   Intimate Partner Violence: Not on file   Housing Stability: Not on file       TOBACCO:   reports that she quit smoking about 28 years ago.  She has never used smokeless tobacco.  ETOH:   reports no history of alcohol use. Family History:   Family History   Problem Relation Age of Onset    Diabetes Mother     High Blood Pressure Mother     Heart Disease Mother     Heart Disease Father     Cancer Father         prostate    Cancer Paternal Grandmother         breast cancer at age 80           Pt interventions:  Trained in strategies for increasing balanced thinking, Provided education, Established rapport, Supportive techniques, Cognitive strategies to target distortions including catastrophizing, and Identified maladaptive thoughts        PLAN:   Explore intimacy  EMDR      Patient scheduled to return on:   Thursday 9/8/2022 at 5:15 PM     Were changes or additions made to the treatment plan today?   YES []   NO [x]  Noted changes:

## 2022-09-08 ENCOUNTER — HOSPITAL ENCOUNTER (OUTPATIENT)
Dept: PSYCHIATRY | Age: 52
Setting detail: THERAPIES SERIES
Discharge: HOME | End: 2022-09-08
Payer: COMMERCIAL

## 2022-09-08 PROCEDURE — 90837 PSYTX W PT 60 MINUTES: CPT | Performed by: SOCIAL WORKER
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
## 2022-09-15 ENCOUNTER — HOSPITAL ENCOUNTER (OUTPATIENT)
Dept: PSYCHIATRY | Age: 52
Setting detail: THERAPIES SERIES
Discharge: HOME | End: 2022-09-15
Payer: COMMERCIAL

## 2022-09-15 PROCEDURE — 90837 PSYTX W PT 60 MINUTES: CPT | Performed by: SOCIAL WORKER

## 2022-09-16 NOTE — PROGRESS NOTES
[facility] Therapy Note in Person  [address]   9/15/2022  5:15 PM  Kelsea Fabiola  1970  [id number]    Time spent with Patient: 60 minutes      Pt was provided informed consent for the [address] [place] [address]. Discussed with patient model of service to include the limits of confidentiality (i.e. abuse reporting, suicide intervention, etc.) and short-term intervention focused approach. Pt indicated understanding. S:  Therapist and pt engaged in check in. Therapist offered pt emotional support and used active listening as pt expressed frustration and discussed a difficult event. Therapist and pt discussed targeting this event at work. Pt was in agreement. 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 Pt and therapist also discussed using assertive communication skills and emotional regulation.        O:  MSE:     Appearance    alert, cooperative, crying, mild distress  Appetite normal  Sleep disturbance Yes  Fatigue Yes  Loss of pleasure No  Impulsive behavior No  Speech    normal volume, well articulated, and pressured  Mood    Demoralization  Affect    normal affect  Thought Content    worthlessness and cognitive distortions  Thought Process    tangential  Associations    logical connections  Insight    Good  Judgment    Intact  Orientation    oriented to person, place, time, and general circumstances  Memory    recent and remote memory intact  Attention/Concentration    intact  Morbid ideation No  Suicide Assessment    no suicidal ideation    A:  Pt presented to session as demoralized but with normal affect. Pt felt low due to issues at work. Pt's speech was pressured, loud and tangential but became stable and intact once EMDR processing began. Pt displayed good insight by identifying what she needed to target. Pt's JAYA was able to drop from a 9 to a 0 and was able to identify positive phrases to say to herself to help when in difficult situations at work. Visit Diagnosis:   Post Traumatic Stress Disorder -Pt is reporting intrusive and arousal symptoms. Pt is experiencing repeated dreams, memories, and flashbacks of the stressful experience. Pt is avoiding memories and external reminders. Pt has strong physical and emotional reactions to these exposures. Pt reports strong negative beliefs about herself. Pt reports self-blame, strong feelings such as fear horror or anger, los of interest in things she used to enjoy, some self-isolation, difficulty experiencing positive emotion, irritability, diffiuclty concentrating, and difficulty sleeping.       History from Medical Record:        Diagnosis Date    Abnormal Pap smear of cervix     ADHD (attention deficit hyperactivity disorder)     Anxiety     Depression     Fibromyalgia     Hyperlipidemia     Obesity     Unspecified sleep apnea     UTI (urinary tract infection)      Medications:   Current Outpatient Medications   Medication Sig Dispense Refill    sertraline (ZOLOFT) 100 MG tablet Take 1.5 tablets by mouth daily 45 tablet 5    cyanocobalamin 1000 MCG/ML injection inject 1 milliliter ( 1000 MCG ) intramuscularly EVERY 30 DAYS 1 mL 11    pantoprazole (PROTONIX) 40 MG tablet take 1 tablet by mouth once daily 90 tablet 0    busPIRone (BUSPAR) 7.5 MG tablet take 1 tablet by mouth twice a day if needed for anxiety 60 tablet 0    hydrOXYzine (ATARAX) 10 MG tablet Take 1 tablet by mouth 3 times daily as needed for Anxiety 90 tablet 2    traZODone (DESYREL) 50 MG tablet Take 0.5 tablets by mouth nightly as needed for Sleep 15 tablet 2 topiramate (TOPAMAX) 100 MG tablet Take 1 tablet by mouth 2 times daily 60 tablet 3    diclofenac sodium (VOLTAREN) 1 % GEL apply 4 grams topically four times a day AS NEEDED FOR PAIN 500 g 5    amphetamine-dextroamphetamine (ADDERALL) 15 MG tablet take 1 tablet by mouth twice a day 60 tablet 0    levETIRAcetam (KEPPRA) 750 MG tablet take 1 tablet by mouth twice a day      fluticasone (FLONASE) 50 MCG/ACT nasal spray 1 spray by Nasal route daily 16 g 5    Pantoprazole Sodium (PROTONIX PO) Take by mouth      loratadine (CLARITIN) 10 MG tablet take 1 tablet by mouth once daily if needed 30 tablet 5    Mirabegron ER (MYRBETRIQ) 25 MG TB24 Take 1 tablet by mouth daily 14 tablet 0    IRON PO       Vitamin D (CHOLECALCIFEROL) 1000 UNITS CAPS capsule Take 1,000 Units by mouth daily. CALCIUM CITRATE PO Take 1,500 mg by mouth daily. acetaminophen (TYLENOL) 325 MG tablet Take 650 mg by mouth every 6 hours as needed. No current facility-administered medications for this encounter.        Social History:   Social History     Socioeconomic History    Marital status:      Spouse name: Not on file    Number of children: Not on file    Years of education: Not on file    Highest education level: Not on file   Occupational History    Not on file   Tobacco Use    Smoking status: Former     Packs/day: 0.00     Years: 1.00     Pack years: 0.00     Types: Cigarettes     Quit date: 1994     Years since quittin.0    Smokeless tobacco: Never   Substance and Sexual Activity    Alcohol use: No    Drug use: No    Sexual activity: Not on file   Other Topics Concern    Not on file   Social History Narrative    Not on file     Social Determinants of Health     Financial Resource Strain: Low Risk     Difficulty of Paying Living Expenses: Not hard at all   Food Insecurity: No Food Insecurity    Worried About [address] [employer] in the Last Year: Never true    [address] in the Last Year: Never true Transportation Needs: Not on file   Physical Activity: Not on file   Stress: Not on file   Social Connections: Not on file   Intimate Partner Violence: Not on file   Housing Stability: Not on file       TOBACCO:   reports that she quit smoking about 28 years ago. She has never used smokeless tobacco.  ETOH:   reports no history of alcohol use. Family History:   Family History   Problem Relation Age of Onset    Diabetes Mother     High Blood Pressure Mother     Heart Disease Mother     Heart Disease Father     Cancer Father         prostate    Cancer Paternal Grandmother         breast cancer at age 80           Pt interventions:  Practiced assertive communication, Trained in improving communication skills, Provided education, Established rapport, Supportive techniques, and Identified maladaptive thoughts, EMDR    PLAN:   EMDR  Target older memory  Possible target Mom      Patient scheduled to return on:   Thursday 9/22/2022 at 5 PM    Were changes or additions made to the treatment plan today?   YES []   NO [x]  Noted changes:

## 2022-09-22 ENCOUNTER — HOSPITAL ENCOUNTER (OUTPATIENT)
Dept: PSYCHIATRY | Age: 52
Setting detail: THERAPIES SERIES
Discharge: HOME | End: 2022-09-22
Payer: COMMERCIAL

## 2022-09-22 PROCEDURE — 90837 PSYTX W PT 60 MINUTES: CPT | Performed by: SOCIAL WORKER

## 2022-09-23 NOTE — PROGRESS NOTES
[facility] Therapy Note in Person  [address]   9/22/2022  5:15 PM  Ruddy Weems  1970  [id number]    Time spent with Patient: 60 minutes      Pt was provided informed consent for the [facility]. Discussed with patient model of service to include the limits of confidentiality (i.e. abuse reporting, suicide intervention, etc.) and short-term intervention focused approach. Pt indicated understanding. S:  Pt and therapist engaged in check in and therapist offered emotional support. Pt completed updated PCL-5 and therapist reviewed results with pt. Therapist needed to redirect pt throughout session as pt tended to go on tangents or rants. Therapist and pt engaged in EMDR. EMDR Treatment     Negative Belief: I'm not worthy  Positive Belief: I deserve good things   Target: Mom telling pt \"you want it too much\" and disallowing her from going out  Past  Initial VOC: 3  Emotions: Sad, lonely  Initial JAYA: 6  Body Location: Chest     Outcome:  Unfinished  Ending JAYA: 2  Ending VOC: N/A  Clear Body Scan? N/A  Closure: Container   Client Stability: Good     Other Notes:  Used parts work, need to explore more with M, Feels like she's not allowed to want things         O:  MSE:     Appearance    alert, cooperative  Appetite normal  Sleep disturbance No  Fatigue No  Loss of pleasure No  Impulsive behavior No  Speech    rapid, loud, and pressured  Mood    Normal  Affect    normal affect  Thought Content    intrusive thoughts and cognitive distortions  Thought Process    tangential  Associations    tangential connections  Insight    Good  Judgment    Intact  Orientation    oriented to person, place, time, and general circumstances  Memory    recent and remote memory intact  Attention/Concentration    intact  Morbid ideation No  Suicide Assessment    no suicidal ideation    A:  Pt presented to session in a normal mood with normal affect but had tangential and pressured speech. Pt often tells long stories about work that is difficult to interject. Pt's PCL5 score decreased to a 29 indicating improvement in PTSD symptoms. Pt also has a belief that she is not able to want things due to her M. Visit Diagnosis:   Post Traumatic Stress Disorder -Pt is reporting intrusive and arousal symptoms. Pt is experiencing repeated dreams, memories, and flashbacks of the stressful experience. Pt is avoiding memories and external reminders. Pt has strong physical and emotional reactions to these exposures. Pt reports strong negative beliefs about herself. Pt reports self-blame, strong feelings such as fear horror or anger, los of interest in things she used to enjoy, some self-isolation, difficulty experiencing positive emotion, irritability, diffiuclty concentrating, and difficulty sleeping.       History from Medical Record:        Diagnosis Date    Abnormal Pap smear of cervix     ADHD (attention deficit hyperactivity disorder)     Anxiety     Depression     Fibromyalgia     Hyperlipidemia     Obesity     Unspecified sleep apnea     UTI (urinary tract infection)      Medications:   Current Outpatient Medications   Medication Sig Dispense Refill    sertraline (ZOLOFT) 100 MG tablet Take 1.5 tablets by mouth daily 45 tablet 5    cyanocobalamin 1000 MCG/ML injection inject 1 milliliter ( 1000 MCG ) intramuscularly EVERY 30 DAYS 1 mL 11    pantoprazole (PROTONIX) 40 MG tablet take 1 tablet by mouth once daily 90 tablet 0    busPIRone (BUSPAR) 7.5 MG tablet take 1 tablet by mouth twice a day if needed for anxiety 60 tablet 0    hydrOXYzine (ATARAX) 10 MG tablet Take 1 tablet by mouth 3 times daily as needed for Anxiety 90 tablet 2    traZODone (DESYREL) 50 MG tablet Take 0.5 tablets by mouth nightly as needed for Sleep 15 tablet 2    topiramate (TOPAMAX) 100 MG tablet Take 1 tablet by mouth 2 times daily 60 tablet 3    diclofenac sodium (VOLTAREN) 1 % GEL apply 4 grams topically four times a day AS NEEDED FOR PAIN 500 g 5    amphetamine-dextroamphetamine (ADDERALL) 15 MG tablet take 1 tablet by mouth twice a day 60 tablet 0    levETIRAcetam (KEPPRA) 750 MG tablet take 1 tablet by mouth twice a day      fluticasone (FLONASE) 50 MCG/ACT nasal spray 1 spray by Nasal route daily 16 g 5    Pantoprazole Sodium (PROTONIX PO) Take by mouth      loratadine (CLARITIN) 10 MG tablet take 1 tablet by mouth once daily if needed 30 tablet 5    Mirabegron ER (MYRBETRIQ) 25 MG TB24 Take 1 tablet by mouth daily 14 tablet 0    IRON PO       Vitamin D (CHOLECALCIFEROL) 1000 UNITS CAPS capsule Take 1,000 Units by mouth daily. CALCIUM CITRATE PO Take 1,500 mg by mouth daily. acetaminophen (TYLENOL) 325 MG tablet Take 650 mg by mouth every 6 hours as needed. No current facility-administered medications for this encounter.        Social History:   Social History     Socioeconomic History    Marital status:      Spouse name: Not on file    Number of children: Not on file    Years of education: Not on file    Highest education level: Not on file   Occupational History    Not on file   Tobacco Use    Smoking status: Former     Packs/day: 0.00     Years: 1.00     Pack years: 0.00     Types: Cigarettes     Quit date: 1994     Years since quittin.0    Smokeless tobacco: Never   Substance and Sexual Activity    Alcohol use: No    Drug use: No    Sexual activity: Not on file   Other Topics Concern    Not on file   Social History Narrative    Not on file     Social Determinants of Health     Financial Resource Strain: Low Risk     Difficulty of Paying Living Expenses: Not hard at all   Food Insecurity: No Food Insecurity    Worried About Running Out of Food in the Last Year: Never true    Ran Out of Food in the Last Year: Never true   Transportation Needs: Not on file   Physical Activity: Not on file   Stress: Not on file   Social Connections: Not on file   Intimate Partner Violence: Not on file   Housing Stability: Not on file       TOBACCO:   reports that she quit smoking about 28 years ago. She has never used smokeless tobacco.  ETOH:   reports no history of alcohol use. Family History:   Family History   Problem Relation Age of Onset    Diabetes Mother     High Blood Pressure Mother     Heart Disease Mother     Heart Disease Father     Cancer Father         prostate    Cancer Paternal Grandmother         breast cancer at age 80           Pt interventions:  Provided education, Provided education on PTSD symptoms and treatment options for evidence-based treatment (Cognitive Processing Therapy and Prolonged Exposure), Established rapport, Conducted functional assessment, Supportive techniques, and Identified maladaptive thoughts        PLAN:   EMDR  Continue target       Patient scheduled to return on:   Thursday 9/29/2022 at 5 PM    Were changes or additions made to the treatment plan today?   YES []   NO [x]  Noted changes:

## 2022-09-28 NOTE — PROGRESS NOTES
TELEHEALTH EVALUATION -- Audio/Visual (During ILIZU-05 public health emergency)      [facility] Therapy Note  FRANCA Ledesma   7/28/2022  5:15 PM  Hermon Bolus  1970  [id number]    This note is copied and pasted from a psychotherapy note. Patient location is at their home address. Location of clinician is at [address] [place] [address] located at [address] [place] [address]. Time spent with Patient: 60 minutes        Pt was provided informed consent for the [address] [facility]. Discussed with patient model of service to include the limits of confidentiality (i.e. abuse reporting, suicide intervention, etc.) and short-term intervention focused approach. Pt indicated understanding. S:  Pt and therapist engaged in check-in and processed pt's last two weeks. Therapist utilized cognitive techniques and reviewed pt's progress. Pt identified that she has noticed less personalization and has been able to have positive interactions with both her daughter and difficult doctors. Therapist and pt engaged in re-evaluation of last target, pt identified that the JAYA remained low and the VOC remained high. Therapist and pt identified a new target and engaged in EMDR therapy techniques. EMDR Treatment      Negative Belief: I don't Matter  Positive Belief:  I'm appreciated   Target: Walking into pt procedure room and noticed it wasn't stocked, and had to set it up herself  Past/Present/Future  Initial VOC: 1  Emotions: Frustrated, angry   Initial JAYA: 10  Body Location: tension in neck, sighs     Outcome:  Unfinished  Ending JAYA: 2  Ending VOC: 3  Clear Body Scan? N/A  Closure: Container   Client Stability: Good      Other Notes:  Pt processed somatically and was able to reduce distress surrounding incident and increase VOC a few points. Pt did not engage in body scan as VOC was improved slightly.                 O:  MSE:      Appearance    alert, cooperative  Appetite normal  Sleep disturbance No  Fatigue No  Loss of pleasure No  Impulsive behavior No  Speech    pressured  Mood    Normal  Affect    normal affect  Thought Content    intact  Thought Process    tangential  Associations    logical connections  Insight    Good  Judgment    Intact  Orientation    oriented to person, place, time, and general circumstances  Memory    recent and remote memory intact  Attention/Concentration    intact  Morbid ideation No  Suicide Assessment    no suicidal ideation     A:  Pt presented to session in a positive mood and affect and was able to identify a reduction in use of cognitive distortions. Pt high slightly pressured and tangential speech and therapist struggled to keep pt focused on subject or task at hand. Pt also required explanation of VOC rating multiple times, but appeared to have expressed understanding. Through EMDR processing pt was able to bring her JAYA down from a 10 to a 2, and increased VOC from a 1 to a 3. Visit Diagnosis:   Post Traumatic Stress Disorder -Pt is reporting intrusive and arousal symptoms. Pt is experiencing repeated dreams, memories, and flashbacks of the stressful experience. Pt is avoiding memories and external reminders. Pt has strong physical and emotional reactions to these exposures. Pt reports strong negative beliefs about herself.   Pt reports self-blame, strong feelings such as fear horror or anger, los of interest in things she used to enjoy, some self-isolation, difficulty experiencing positive emotion, irritability, diffiuclty concentrating, and difficulty sleeping     History from Medical Record:     Past Medical History             Diagnosis Date    Abnormal Pap smear of cervix      ADHD (attention deficit hyperactivity disorder)      Anxiety      Depression      Fibromyalgia      Hyperlipidemia      Obesity      Unspecified sleep apnea      UTI (urinary tract infection)           Medications:   Current Facility-Administered Medications          Current Outpatient Medications   Medication Sig Dispense Refill    sertraline (ZOLOFT) 100 MG tablet Take 1.5 tablets by mouth daily 45 tablet 5    cyanocobalamin 1000 MCG/ML injection inject 1 milliliter ( 1000 MCG ) intramuscularly EVERY 30 DAYS 1 mL 11    pantoprazole (PROTONIX) 40 MG tablet take 1 tablet by mouth once daily 90 tablet 0    busPIRone (BUSPAR) 7.5 MG tablet take 1 tablet by mouth twice a day if needed for anxiety 60 tablet 0    hydrOXYzine (ATARAX) 10 MG tablet Take 1 tablet by mouth 3 times daily as needed for Anxiety 90 tablet 2    traZODone (DESYREL) 50 MG tablet Take 0.5 tablets by mouth nightly as needed for Sleep 15 tablet 2    topiramate (TOPAMAX) 100 MG tablet Take 1 tablet by mouth 2 times daily 60 tablet 3    diclofenac sodium (VOLTAREN) 1 % GEL apply 4 grams topically four times a day AS NEEDED FOR PAIN 500 g 5    amphetamine-dextroamphetamine (ADDERALL) 15 MG tablet take 1 tablet by mouth twice a day 60 tablet 0    levETIRAcetam (KEPPRA) 750 MG tablet take 1 tablet by mouth twice a day        fluticasone (FLONASE) 50 MCG/ACT nasal spray 1 spray by Nasal route daily 16 g 5    Pantoprazole Sodium (PROTONIX PO) Take by mouth        loratadine (CLARITIN) 10 MG tablet take 1 tablet by mouth once daily if needed 30 tablet 5    Mirabegron ER (MYRBETRIQ) 25 MG TB24 Take 1 tablet by mouth daily 14 tablet 0    IRON PO          Vitamin D (CHOLECALCIFEROL) 1000 UNITS CAPS capsule Take 1,000 Units by mouth daily. CALCIUM CITRATE PO Take 1,500 mg by mouth daily. acetaminophen (TYLENOL) 325 MG tablet Take 650 mg by mouth every 6 hours as needed. No current facility-administered medications for this encounter.             Social History:   Social History               Socioeconomic History    Marital status:        Spouse name: Not on file    Number of children: Not on file    Years of education: Not on file    Highest education level: Not on file   Occupational History    Not on file   Tobacco Use    Smoking status: Former       Packs/day: 0.00       Years: 1.00       Pack years: 0.00       Types: Cigarettes       Quit date: 1994       Years since quittin.9    Smokeless tobacco: Never   Substance and Sexual Activity    Alcohol use: No    Drug use: No    Sexual activity: Not on file   Other Topics Concern    Not on file   Social History Narrative    Not on file      Social Determinants of Health          Financial Resource Strain: Low Risk     Difficulty of Paying Living Expenses: Not hard at all   Food Insecurity: No Food Insecurity    Worried About Running Out of Food in the Last Year: Never true    Ran Out of Food in the Last Year: Never true   Transportation Needs: Not on file   Physical Activity: Not on file   Stress: Not on file   Social Connections: Not on file   Intimate Partner Violence: Not on file   Housing Stability: Not on file            TOBACCO:   reports that she quit smoking about 27 years ago. She has never used smokeless tobacco.  ETOH:   reports no history of alcohol use. Family History:   Family History         Family History   Problem Relation Age of Onset    Diabetes Mother      High Blood Pressure Mother      Heart Disease Mother      Heart Disease Father      Cancer Father           prostate    Cancer Paternal Grandmother           breast cancer at age 80                  Pt interventions:  Provided education, Established rapport, Supportive techniques, and Identified maladaptive thoughts, EMDR           PLAN:   Continue EMDR processing with previous target         Patient scheduled to return on:   2022 at 5:15 PM      Were changes or additions made to the treatment plan today?   YES []   NO [x]  Noted changes:                       Pursuant to the emergency declaration under the [address], [facility] waiver authority and the [facility] Response Supplemental Appropriations Act, this Virtual Visit was conducted, with patient's consent, to reduce the patient's risk of exposure to COVID-19 and provide continuity of care for an established patient. Services were provided through a video synchronous discussion virtually to substitute for in-person clinic visit.

## 2022-09-28 NOTE — PROGRESS NOTES
TELEHEALTH EVALUATION -- Audio/Visual (During VVZYS-11 public health emergency)      [facility] Therapy Note  Farazstormy FabioFRANCA   8/4/2022  5:15 PM  Boo Lopez  1970  [id number]     Patient location is at their home address. Location of clinician is at [address] [place] [address] located at [address] [place] [address]. Time spent with Patient: 60 minutes     This note is copied and pasted from a psychotherapy note. Pt was provided informed consent for the [address] [facility]. Discussed with patient model of service to include the limits of confidentiality (i.e. abuse reporting, suicide intervention, etc.) and short-term intervention focused approach. Pt indicated understanding. S:  Pt and therapist met and engaged in check in. Pt identified that her JAYA from her previous target (room was not stocked) decreased from a 2 to a 0. Pt identified that she is no longer personalizing things at work and that she isn't assuming people think the worst of her. Pt has begun to reframe her thoughts. Therapist and pt then engaged in EMDR to process target about pt's . EMDR Treatment      Negative Belief: There's something wrong with me   Positive Belief: I'm enough  Target:  lost wedding ring and lied about it  Present   Initial VOC: 3  Emotions: betrayal, anger   Initial JAYA: 10  Body Location: Chest     Outcome:  Unfinished  Ending JAYA: 8  Ending VOC: N/A  Clear Body Scan? N/A  Closure: Resources   Client Stability: Good      Other Notes:  Pt has a fear of being alone. Pt is reminded of her ex cheating on her.              O:  MSE:      Appearance    alert, cooperative, mild distress  Appetite normal  Sleep disturbance No  Fatigue Yes  Loss of pleasure No  Impulsive behavior No  Speech    normal volume and pressured  Mood    varied  Affect    normal affect  Thought Content    cognitive distortions  Thought Process    linear, goal directed, and coherent  Associations    logical connections  Insight    Good  Judgment    Intact  Orientation    oriented to person, place, time, and general circumstances  Memory    recent and remote memory intact  Attention/Concentration    intact  Morbid ideation No  Suicide Assessment    no suicidal ideation     A:  Pt presented to session as tired but regulated. Pt has been able to reduce personalization and has been able to reframe her thoughts lately. Pt has also been engaging in catastrophizing about her  lately. Pt has been having increased distrust about her 's fidelity, but struggles to identify if it is based on fact or feelings. Pt would benefit from processing her thoughts and feelings through EMDR and cognitive techniques. Visit Diagnosis:   Post Traumatic Stress Disorder -Pt is reporting intrusive and arousal symptoms. Pt is experiencing repeated dreams, memories, and flashbacks of the stressful experience. Pt is avoiding memories and external reminders. Pt has strong physical and emotional reactions to these exposures. Pt reports strong negative beliefs about herself. Pt reports self-blame, strong feelings such as fear horror or anger, los of interest in things she used to enjoy, some self-isolation, difficulty experiencing positive emotion, irritability, diffiuclty concentrating, and difficulty sleeping.         History from Medical Record:     Past Medical History             Diagnosis Date    Abnormal Pap smear of cervix      ADHD (attention deficit hyperactivity disorder)      Anxiety      Depression      Fibromyalgia      Hyperlipidemia      Obesity      Unspecified sleep apnea      UTI (urinary tract infection)           Medications:   Current Facility-Administered Medications          Current Outpatient Medications   Medication Sig Dispense Refill    sertraline (ZOLOFT) 100 MG tablet Take 1.5 tablets by mouth daily 45 tablet 5    cyanocobalamin 1000 MCG/ML injection inject 1 milliliter ( 1000 MCG ) intramuscularly EVERY 30 DAYS 1 mL 11    pantoprazole (PROTONIX) 40 MG tablet take 1 tablet by mouth once daily 90 tablet 0    busPIRone (BUSPAR) 7.5 MG tablet take 1 tablet by mouth twice a day if needed for anxiety 60 tablet 0    hydrOXYzine (ATARAX) 10 MG tablet Take 1 tablet by mouth 3 times daily as needed for Anxiety 90 tablet 2    traZODone (DESYREL) 50 MG tablet Take 0.5 tablets by mouth nightly as needed for Sleep 15 tablet 2    topiramate (TOPAMAX) 100 MG tablet Take 1 tablet by mouth 2 times daily 60 tablet 3    diclofenac sodium (VOLTAREN) 1 % GEL apply 4 grams topically four times a day AS NEEDED FOR PAIN 500 g 5    amphetamine-dextroamphetamine (ADDERALL) 15 MG tablet take 1 tablet by mouth twice a day 60 tablet 0    levETIRAcetam (KEPPRA) 750 MG tablet take 1 tablet by mouth twice a day        fluticasone (FLONASE) 50 MCG/ACT nasal spray 1 spray by Nasal route daily 16 g 5    Pantoprazole Sodium (PROTONIX PO) Take by mouth        loratadine (CLARITIN) 10 MG tablet take 1 tablet by mouth once daily if needed 30 tablet 5    Mirabegron ER (MYRBETRIQ) 25 MG TB24 Take 1 tablet by mouth daily 14 tablet 0    IRON PO          Vitamin D (CHOLECALCIFEROL) 1000 UNITS CAPS capsule Take 1,000 Units by mouth daily. CALCIUM CITRATE PO Take 1,500 mg by mouth daily. acetaminophen (TYLENOL) 325 MG tablet Take 650 mg by mouth every 6 hours as needed. No current facility-administered medications for this encounter.             Social History:   Social History               Socioeconomic History    Marital status:        Spouse name: Not on file    Number of children: Not on file    Years of education: Not on file    Highest education level: Not on file   Occupational History    Not on file   Tobacco Use    Smoking status: Former       Packs/day: 0.00       Years: 1.00       Pack years: 0.00       Types: Cigarettes       Quit date: 9/1/1994       Years since quittin.9    Smokeless tobacco: Never   Substance and Sexual Activity    Alcohol use: No    Drug use: No    Sexual activity: Not on file   Other Topics Concern    Not on file   Social History Narrative    Not on file      Social Determinants of Health          Financial Resource Strain: Low Risk     Difficulty of Paying Living Expenses: Not hard at all   Food Insecurity: No Food Insecurity    Worried About Running Out of Food in the Last Year: Never true    Ran Out of Food in the Last Year: Never true   Transportation Needs: Not on file   Physical Activity: Not on file   Stress: Not on file   Social Connections: Not on file   Intimate Partner Violence: Not on file   Housing Stability: Not on file            TOBACCO:   reports that she quit smoking about 27 years ago. She has never used smokeless tobacco.  ETOH:   reports no history of alcohol use. Family History:   Family History         Family History   Problem Relation Age of Onset    Diabetes Mother      High Blood Pressure Mother      Heart Disease Mother      Heart Disease Father      Cancer Father           prostate    Cancer Paternal Grandmother           breast cancer at age 80                  Pt interventions:  Trained in relaxation strategies, Provided education, Provided education on PTSD symptoms and treatment options for evidence-based treatment (Cognitive Processing Therapy and Prolonged Exposure), Discussed potential barriers to change, Established rapport, Conducted functional assessment, Supportive techniques, and Identified maladaptive thoughts, EMDR           PLAN:   Look at facts vs. Feelings with pt's   EMDR        Patient scheduled to return on:   2022 at 5:15 PM     Were changes or additions made to the treatment plan today?   YES []   NO [x]  Noted changes:                       Pursuant to the emergency declaration under the [facility] waiver authority and the Coronavirus Preparedness and Response Supplemental Appropriations Act, this Virtual Visit was conducted, with patient's consent, to reduce the patient's risk of exposure to COVID-19 and provide continuity of care for an established patient. Services were provided through a video synchronous discussion virtually to substitute for in-person clinic visit.

## 2022-09-28 NOTE — PROGRESS NOTES
[facility] Therapy Note in Person  [address]   8/11/2022  5:15 PM  Viraj Ocampo  1970  [id number]    This note is copied and pasted from a psychotherapy note. Time spent with Patient: 60 minutes        [facility] Discussed with patient model of service to include the limits of confidentiality (i.e. abuse reporting, suicide intervention, etc.) and short-term intervention focused approach. Pt indicated understanding. S:  Pt and therapist engaged in check in and therapist offered pt emotional support and used active listening to help pt identify needs and problem solve. Pt's previous target ended with a 3 but pt did not want to continue with that target. Therapist led pt in EMDR interventions. EMDR Treatment      Negative Belief: I'm stupid   Positive Belief: I'm only human  Target: People at work pointing out her mistakes   Present  Initial VOC: 2  Emotions:sad, alone   Initial JAYA 7  Body Location: trunk     Outcome:  Completed  Ending JAYA: 1  Ending VOC: 7  Clear Body Scan? Yes  Closure: Container  Client Stability Good     Other Notes:     Pt processed mostly somatically and was able to believe the positive thought.                 O:  MSE:      Appearance    alert, cooperative, mild distress  Appetite normal  Sleep disturbance No  Fatigue No  Loss of pleasure No  Impulsive behavior No  Speech    pressured  Mood    Anxious  Affect    anxiety  Thought Content    cognitive distortions and all or nothing thinking  Thought Process    tangential  Associations    tangential connections  Insight    Good  Judgment    Intact  Orientation    oriented to person, place, time, and general circumstances  Memory    recent and remote memory intact  Attention/Concentration    impaired  Morbid ideation No  Suicide Assessment    no suicidal ideation     A:  Pt presented to session as slightly dysregulated but was able to regulate once she externally processed her thoughts. Pt's speech was slightly pressured and tangential but therapist was able to redirect. Pt processed her target somatically and allowed the emotions to move through her body. Pt was able to reduce the distress of having mistakes called out and accepted that making mistakes is okay. Visit Diagnosis:   Post Traumatic Stress Disorder -Pt is reporting intrusive and arousal symptoms. Pt is experiencing repeated dreams, memories, and flashbacks of the stressful experience. Pt is avoiding memories and external reminders. Pt has strong physical and emotional reactions to these exposures. Pt reports strong negative beliefs about herself. Pt reports self-blame, strong feelings such as fear horror or anger, los of interest in things she used to enjoy, some self-isolation, difficulty experiencing positive emotion, irritability, diffiuclty concentrating, and difficulty sleeping.      History from Medical Record:     Past Medical History             Diagnosis Date    Abnormal Pap smear of cervix      ADHD (attention deficit hyperactivity disorder)      Anxiety      Depression      Fibromyalgia      Hyperlipidemia      Obesity      Unspecified sleep apnea      UTI (urinary tract infection)           Medications:   Current Facility-Administered Medications          Current Outpatient Medications   Medication Sig Dispense Refill    sertraline (ZOLOFT) 100 MG tablet Take 1.5 tablets by mouth daily 45 tablet 5    cyanocobalamin 1000 MCG/ML injection inject 1 milliliter ( 1000 MCG ) intramuscularly EVERY 30 DAYS 1 mL 11    pantoprazole (PROTONIX) 40 MG tablet take 1 tablet by mouth once daily 90 tablet 0    busPIRone (BUSPAR) 7.5 MG tablet take 1 tablet by mouth twice a day if needed for anxiety 60 tablet 0    hydrOXYzine (ATARAX) 10 MG tablet Take 1 tablet by mouth 3 times daily as needed for Anxiety 90 tablet 2    traZODone (DESYREL) 50 MG tablet Take 0.5 tablets by mouth nightly as needed for Sleep 15 tablet 2    topiramate (TOPAMAX) 100 MG tablet Take 1 tablet by mouth 2 times daily 60 tablet 3    diclofenac sodium (VOLTAREN) 1 % GEL apply 4 grams topically four times a day AS NEEDED FOR PAIN 500 g 5    amphetamine-dextroamphetamine (ADDERALL) 15 MG tablet take 1 tablet by mouth twice a day 60 tablet 0    levETIRAcetam (KEPPRA) 750 MG tablet take 1 tablet by mouth twice a day        fluticasone (FLONASE) 50 MCG/ACT nasal spray 1 spray by Nasal route daily 16 g 5    Pantoprazole Sodium (PROTONIX PO) Take by mouth        loratadine (CLARITIN) 10 MG tablet take 1 tablet by mouth once daily if needed 30 tablet 5    Mirabegron ER (MYRBETRIQ) 25 MG TB24 Take 1 tablet by mouth daily 14 tablet 0    IRON PO          Vitamin D (CHOLECALCIFEROL) 1000 UNITS CAPS capsule Take 1,000 Units by mouth daily. CALCIUM CITRATE PO Take 1,500 mg by mouth daily. acetaminophen (TYLENOL) 325 MG tablet Take 650 mg by mouth every 6 hours as needed. No current facility-administered medications for this encounter.             Social History:   Social History               Socioeconomic History    Marital status:        Spouse name: Not on file    Number of children: Not on file    Years of education: Not on file    Highest education level: Not on file   Occupational History    Not on file   Tobacco Use    Smoking status: Former       Packs/day: 0.00       Years: 1.00       Pack years: 0.00       Types: Cigarettes       Quit date: 1994       Years since quittin.9    Smokeless tobacco: Never   Substance and Sexual Activity    Alcohol use: No    Drug use: No    Sexual activity: Not on file   Other Topics Concern    Not on file   Social History Narrative    Not on file      Social Determinants of Health          Financial Resource Strain: Low Risk     Difficulty of Paying Living Expenses: Not hard at all   Food Insecurity: No Food Insecurity    Worried About Running Out of Food in the Last Year: Never true    Ran Out of Food in the Last Year: Never true   Transportation Needs: Not on file   Physical Activity: Not on file   Stress: Not on file   Social Connections: Not on file   Intimate Partner Violence: Not on file   Housing Stability: Not on file            TOBACCO:   reports that she quit smoking about 27 years ago. She has never used smokeless tobacco.  ETOH:   reports no history of alcohol use. Family History:   Family History         Family History   Problem Relation Age of Onset    Diabetes Mother      High Blood Pressure Mother      Heart Disease Mother      Heart Disease Father      Cancer Father           prostate    Cancer Paternal Grandmother           breast cancer at age 80                  Pt interventions:  Provided education, Established rapport, Supportive techniques, and Identified maladaptive thoughts, EMDR           PLAN:   EMDR         Patient scheduled to return on:   Thursday 8/18/2022 at 5:15 PM     Were changes or additions made to the treatment plan today?   YES []   NO [x]  Noted changes:

## 2022-09-28 NOTE — PROGRESS NOTES
[facility] Therapy Note in Person  [address]   8/18/2022  5:15 PM  Sol Jenifer  1970  [id number]     This note is copied and pasted from a psychotherapy note. Time spent with Patient: 60 minutes        Pt was provided informed consent for the [address] [facility]. Discussed with patient model of service to include the limits of confidentiality (i.e. abuse reporting, suicide intervention, etc.) and short-term intervention focused approach. Pt indicated understanding. S:  Pt and therapist engaged in check in and processed work. Therapist and pt re-evaluated previous target which she indicated was now at a 0. Therapist led pt in exploring thoughts for next target and engaged in EMDR. EMDR Treatment      Negative Belief: I'm stupid  Positive Belief:  I'm capable  Target: Mom calling me stupid  Past  Initial VOC: 4  Emotions: sad  Initial JAYA: 7  Body Location: Face     Outcome:  Completed  Ending JAYA: 1  Ending VOC: 7  Clear Body Scan? yes  Closure: yes   Client Stability: Good     Other Notes: Look at pt's worth rather than capability               O:  MSE:      Appearance    alert, cooperative  Appetite normal  Sleep disturbance No  Fatigue No  Loss of pleasure No  Impulsive behavior No  Speech    pressured  Mood    Normal  Affect    normal affect  Thought Content    cognitive distortions and all or nothing thinking  Thought Process    tangential  Associations    tangential connections  Insight    Good  Judgment    Intact  Orientation    oriented to person, place, time, and general circumstances  Memory    recent and remote memory intact  Attention/Concentration    intact  Morbid ideation No  Suicide Assessment    no suicidal ideation     A:  Pt was slightly tangential and pressured in speech but was able to be redirected. Pt identified that she has been thinking \"i'm stupid\" lately and was able to make the connection between that and her M.   Pt processes somatically with some images and was able to release emotion. Pt's JAYA was able to decrease and she was able to believe that she is capable. Visit Diagnosis:   Post Traumatic Stress Disorder -Pt is reporting intrusive and arousal symptoms. Pt is experiencing repeated dreams, memories, and flashbacks of the stressful experience. Pt is avoiding memories and external reminders. Pt has strong physical and emotional reactions to these exposures. Pt reports strong negative beliefs about herself. Pt reports self-blame, strong feelings such as fear horror or anger, los of interest in things she used to enjoy, some self-isolation, difficulty experiencing positive emotion, irritability, diffiuclty concentrating, and difficulty sleeping.      History from Medical Record:     Past Medical History             Diagnosis Date    Abnormal Pap smear of cervix      ADHD (attention deficit hyperactivity disorder)      Anxiety      Depression      Fibromyalgia      Hyperlipidemia      Obesity      Unspecified sleep apnea      UTI (urinary tract infection)           Medications:   Current Facility-Administered Medications          Current Outpatient Medications   Medication Sig Dispense Refill    sertraline (ZOLOFT) 100 MG tablet Take 1.5 tablets by mouth daily 45 tablet 5    cyanocobalamin 1000 MCG/ML injection inject 1 milliliter ( 1000 MCG ) intramuscularly EVERY 30 DAYS 1 mL 11    pantoprazole (PROTONIX) 40 MG tablet take 1 tablet by mouth once daily 90 tablet 0    busPIRone (BUSPAR) 7.5 MG tablet take 1 tablet by mouth twice a day if needed for anxiety 60 tablet 0    hydrOXYzine (ATARAX) 10 MG tablet Take 1 tablet by mouth 3 times daily as needed for Anxiety 90 tablet 2    traZODone (DESYREL) 50 MG tablet Take 0.5 tablets by mouth nightly as needed for Sleep 15 tablet 2    topiramate (TOPAMAX) 100 MG tablet Take 1 tablet by mouth 2 times daily 60 tablet 3    diclofenac sodium (VOLTAREN) 1 % GEL apply 4 grams topically four times a day AS NEEDED FOR PAIN 500 g 5    amphetamine-dextroamphetamine (ADDERALL) 15 MG tablet take 1 tablet by mouth twice a day 60 tablet 0    levETIRAcetam (KEPPRA) 750 MG tablet take 1 tablet by mouth twice a day        fluticasone (FLONASE) 50 MCG/ACT nasal spray 1 spray by Nasal route daily 16 g 5    Pantoprazole Sodium (PROTONIX PO) Take by mouth        loratadine (CLARITIN) 10 MG tablet take 1 tablet by mouth once daily if needed 30 tablet 5    Mirabegron ER (MYRBETRIQ) 25 MG TB24 Take 1 tablet by mouth daily 14 tablet 0    IRON PO          Vitamin D (CHOLECALCIFEROL) 1000 UNITS CAPS capsule Take 1,000 Units by mouth daily. CALCIUM CITRATE PO Take 1,500 mg by mouth daily. acetaminophen (TYLENOL) 325 MG tablet Take 650 mg by mouth every 6 hours as needed. No current facility-administered medications for this encounter.             Social History:   Social History               Socioeconomic History    Marital status:        Spouse name: Not on file    Number of children: Not on file    Years of education: Not on file    Highest education level: Not on file   Occupational History    Not on file   Tobacco Use    Smoking status: Former       Packs/day: 0.00       Years: 1.00       Pack years: 0.00       Types: Cigarettes       Quit date: 1994       Years since quittin.9    Smokeless tobacco: Never   Substance and Sexual Activity    Alcohol use: No    Drug use: No    Sexual activity: Not on file   Other Topics Concern    Not on file   Social History Narrative    Not on file      Social Determinants of Health          Financial Resource Strain: Low Risk     Difficulty of Paying Living Expenses: Not hard at all   Food Insecurity: No Food Insecurity    Worried About Running Out of Food in the Last Year: Never true    Ran Out of Food in the Last Year: Never true   Transportation Needs: Not on file   Physical Activity: Not on file   Stress: Not on file   Social Connections: Not on file   Intimate Partner Violence: Not on file   Housing Stability: Not on file            TOBACCO:   reports that she quit smoking about 27 years ago. She has never used smokeless tobacco.  ETOH:   reports no history of alcohol use. Family History:   Family History         Family History   Problem Relation Age of Onset    Diabetes Mother      High Blood Pressure Mother      Heart Disease Mother      Heart Disease Father      Cancer Father           prostate    Cancer Paternal Grandmother           breast cancer at age 80                  Pt interventions:  Provided education, Established rapport, Supportive techniques, and Identified maladaptive thoughts, EMDR           PLAN:   EMDR  Explore \"fault\" and worth        Patient scheduled to return on:   Thursday 8/25/2022     Were changes or additions made to the treatment plan today?   YES []   NO [x]  Noted changes:

## 2022-09-29 ENCOUNTER — HOSPITAL ENCOUNTER (OUTPATIENT)
Dept: PSYCHIATRY | Age: 52
Setting detail: THERAPIES SERIES
Discharge: HOME | End: 2022-09-29
Payer: COMMERCIAL

## 2022-09-29 PROCEDURE — 90837 PSYTX W PT 60 MINUTES: CPT | Performed by: SOCIAL WORKER
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
## 2022-10-06 ENCOUNTER — HOSPITAL ENCOUNTER (OUTPATIENT)
Dept: PSYCHIATRY | Age: 52
Setting detail: THERAPIES SERIES
Discharge: HOME | End: 2022-10-06
Payer: COMMERCIAL

## 2022-10-06 PROCEDURE — 90837 PSYTX W PT 60 MINUTES: CPT | Performed by: SOCIAL WORKER

## 2022-10-07 NOTE — PROGRESS NOTES
[facility] Therapy Note in Person  Missy Arora [address]   10/6/22  5:15 PM  Rodrigo Fontenots  1970  [id number]    Time spent with Patient: 60 minutes      Pt was provided informed consent for the [address] [place] [address]. Discussed with patient model of service to include the limits of confidentiality (i.e. abuse reporting, suicide intervention, etc.) and short-term intervention focused approach. Pt indicated understanding. S:  Pt and therapist processed pt's week. Pt identified times when she was able to regulate her emotions at work. Therapist offered emotional support to pt and then engaged in EMDR. EMDR Treatment     Negative Belief: I'm bad  Positive Belief:  I'm Good   Target: Negative interactions with mother in law  Past/Present  Initial VOC: 1  Emotions: mad, anger  Initial JAYA: 10  Body Location: Stomach    Outcome:  Completed  Ending JAYA: 0   Ending VOC: 7  Clear Body Scan Yes  Closure: Breathing  Client Stability: Good    Other Notes:  Pt was able to identify that she doesn't have to \"stay and be treated like that\" like she did with her mom. Pt also identified it's okay to be alone rather than staying in bad relationships. O:  MSE:     Appearance    alert, cooperative  Appetite normal  Sleep disturbance Yes  Fatigue Yes  Loss of pleasure No  Impulsive behavior No  Speech    normal rate, normal volume, and well articulated  Mood    Angry  Affect    agitation  Thought Content    intact  Thought Process    linear, goal directed, and coherent  Associations    logical connections  Insight    Good  Judgment    Intact  Orientation    oriented to person, place, time, and general circumstances  Memory    recent and remote memory intact  Attention/Concentration    intact  Morbid ideation No  Suicide Assessment    no suicidal ideation    A:  Pt presented to session as angry and agitated but regulated emotions as session progressed.   Pt was able to identify negative beliefs about herself based on mother in law and process them in EMDR. Pt was able to reduce JAYA significantly and identify solutions to future problems with her. Pt was able to [employer] of some of her anger. Visit Diagnosis:      Post Traumatic Stress Disorder -Pt is reporting intrusive and arousal symptoms. Pt is experiencing repeated dreams, memories, and flashbacks of the stressful experience. Pt is avoiding memories and external reminders. Pt has strong physical and emotional reactions to these exposures. Pt reports strong negative beliefs about herself. Pt reports self-blame, strong feelings such as fear horror or anger, los of interest in things she used to enjoy, some self-isolation, difficulty experiencing positive emotion, irritability, diffiuclty concentrating, and difficulty sleeping.       History from Medical Record:        Diagnosis Date    Abnormal Pap smear of cervix     ADHD (attention deficit hyperactivity disorder)     Anxiety     Depression     Fibromyalgia     Hyperlipidemia     Obesity     Unspecified sleep apnea     UTI (urinary tract infection)      Medications:   Current Outpatient Medications   Medication Sig Dispense Refill    pantoprazole (PROTONIX) 40 MG tablet take 1 tablet by mouth once daily 90 tablet 0    sertraline (ZOLOFT) 100 MG tablet Take 1.5 tablets by mouth daily 45 tablet 5    cyanocobalamin 1000 MCG/ML injection inject 1 milliliter ( 1000 MCG ) intramuscularly EVERY 30 DAYS 1 mL 11    busPIRone (BUSPAR) 7.5 MG tablet take 1 tablet by mouth twice a day if needed for anxiety 60 tablet 0    hydrOXYzine (ATARAX) 10 MG tablet Take 1 tablet by mouth 3 times daily as needed for Anxiety 90 tablet 2    traZODone (DESYREL) 50 MG tablet Take 0.5 tablets by mouth nightly as needed for Sleep 15 tablet 2    topiramate (TOPAMAX) 100 MG tablet Take 1 tablet by mouth 2 times daily 60 tablet 3    diclofenac sodium (VOLTAREN) 1 % GEL apply 4 grams topically four times a day AS NEEDED FOR PAIN 500 g 5    amphetamine-dextroamphetamine (ADDERALL) 15 MG tablet take 1 tablet by mouth twice a day 60 tablet 0    levETIRAcetam (KEPPRA) 750 MG tablet take 1 tablet by mouth twice a day      fluticasone (FLONASE) 50 MCG/ACT nasal spray 1 spray by Nasal route daily 16 g 5    Pantoprazole Sodium (PROTONIX PO) Take by mouth      loratadine (CLARITIN) 10 MG tablet take 1 tablet by mouth once daily if needed 30 tablet 5    Mirabegron ER (MYRBETRIQ) 25 MG TB24 Take 1 tablet by mouth daily 14 tablet 0    IRON PO       Vitamin D (CHOLECALCIFEROL) 1000 UNITS CAPS capsule Take 1,000 Units by mouth daily. CALCIUM CITRATE PO Take 1,500 mg by mouth daily. acetaminophen (TYLENOL) 325 MG tablet Take 650 mg by mouth every 6 hours as needed. No current facility-administered medications for this encounter.        Social History:   Social History     Socioeconomic History    Marital status:      Spouse name: Not on file    Number of children: Not on file    Years of education: Not on file    Highest education level: Not on file   Occupational History    Not on file   Tobacco Use    Smoking status: Former     Packs/day: 0.00     Years: 1.00     Pack years: 0.00     Types: Cigarettes     Quit date: 1994     Years since quittin.1    Smokeless tobacco: Never   Substance and Sexual Activity    Alcohol use: No    Drug use: No    Sexual activity: Not on file   Other Topics Concern    Not on file   Social History Narrative    Not on file     Social Determinants of Health     Financial Resource Strain: Low Risk     Difficulty of Paying Living Expenses: Not hard at all   Food Insecurity: No Food Insecurity    Worried About Running Out of Food in the Last Year: Never true    Ran Out of Food in the Last Year: Never true   Transportation Needs: Not on file   Physical Activity: Not on file   Stress: Not on file   Social Connections: Not on file   Intimate Partner Violence: Not on file   Housing Stability: Not on file TOBACCO:   reports that she quit smoking about 28 years ago. She has never used smokeless tobacco.  ETOH:   reports no history of alcohol use. Family History:   Family History   Problem Relation Age of Onset    Diabetes Mother     High Blood Pressure Mother     Heart Disease Mother     Heart Disease Father     Cancer Father         prostate    Cancer Paternal Grandmother         breast cancer at age 80           Pt interventions:  Established rapport, Supportive techniques, and Identified maladaptive thoughts, EMDR        PLAN:   EMDR: Mom or past trauma       Patient scheduled to return on:   Thursday 10/13/2022 at 5:15 PM    Were changes or additions made to the treatment plan today?   YES []   NO [x]  Noted changes:

## 2022-10-13 ENCOUNTER — HOSPITAL ENCOUNTER (OUTPATIENT)
Dept: PSYCHIATRY | Age: 52
Setting detail: THERAPIES SERIES
Discharge: HOME | End: 2022-10-13
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
## 2022-10-20 ENCOUNTER — HOSPITAL ENCOUNTER (OUTPATIENT)
Dept: PSYCHIATRY | Age: 52
Setting detail: THERAPIES SERIES
Discharge: HOME | End: 2022-10-20

## 2022-10-27 ENCOUNTER — HOSPITAL ENCOUNTER (OUTPATIENT)
Dept: PSYCHIATRY | Age: 52
Setting detail: THERAPIES SERIES
Discharge: HOME | End: 2022-10-27
Payer: COMMERCIAL

## 2022-10-27 PROCEDURE — 90837 PSYTX W PT 60 MINUTES: CPT | Performed by: SOCIAL WORKER

## 2022-10-28 NOTE — PROGRESS NOTES
"Patient's wife called in this afternoon with an update on patient's status.  Per wife patient is still extremely fatigued. He received 2 units of PRBCs last week in clinic for a Hgb of 8.1 and extreme fatigue.   Per wife the transfusions did not seem to help much and the patient has been sleeping upwards of 8 hours a day as well as sleeping through the night.  Patient is still having some AMS with worsening \"sun downers\" like symptoms in the evening. Wife is also concerned that patient is not getting enough hydration and tries to get get fluids in through watermelon and popsicles. Wife did state that Yamil fell on Friday, slipping on an area rug. She states that he did not hit his head and seems to have escaped with out even a bruise. Wife is just wondering if he should have an ALEXANDER visit prior to getting his chemo this week. Updated Dr. Topete who agrees that patient should be assessed prior to getting anymore chemo this week. Was able to schedule patient with ALEXANDER Lashay Romero tomorrow at 12:10 PM. Updated patient's wife who will have Yamil at the appointment as requested tomorrow.   " [facility] Therapy Note in Person  Petros Moss, [address]   10/27/2022  5:15 PM  Soraida Diver  1970  [id number]    Time spent with Patient: 60 minutes      [facility] [address] [facility] Discussed with patient model of service to include the limits of confidentiality (i.e. abuse reporting, suicide intervention, etc.) and short-term intervention focused approach. Pt indicated understanding. S:  Pt and therapist engaged in check in and processed previous 2 weeks. Therapist offered pt emotional support and used cognitive techniques to process work. Therapist and pt collaboratively chose appropriate target for EMDR. Therapist and pt engaged in EMDR. EMDR Treatment     Negative Belief: I'm Vulnerable  Positive Belief:  I'm strong   Target: Person in parking lot screamed curse words at me  Present   Initial VOC: 1  Emotions: Scared, sad  Initial JAYA: 6  Body Location: Tension in lower back     Outcome:  Completed  Ending JAYA: 0  Ending VOC: 7  Clear Body Scan? Yes   Closure: Resources and discussion   Client Stability: Good     Other Notes:    Pt pictured a closet with things she needs to clean out. Pt and therapist to explore this further for what pt needs to work on. Therapist and pt discussed not letting people's words \"get through her door\" while still being pleasant to people. Pt was able to identify that she could stand up for herself.           O:  MSE:     Appearance    alert, cooperative, crying, mild distress  Appetite normal  Sleep disturbance No  Fatigue Yes  Loss of pleasure Yes  Impulsive behavior No  Speech    normal rate, normal volume, well articulated, and pressured  Mood    Worthless  Affect    low self esteem, sad  Thought Content    helplessness, worthlessness, cognitive distortions, and all or nothing thinking  Thought Process    tangential  Associations    tangential connections  Insight    Good  Judgment    Intact  Orientation oriented to person, place, time, and general circumstances  Memory    recent and remote memory intact  Attention/Concentration    intact  Morbid ideation No  Suicide Assessment    no suicidal ideation    A:  Pt presented to session in a low and worthless mood with low affect. Pt was tearful throughout EMDR processing. Pt feels sad as she feels as though people do not hear her or care for her. Pt often feels vulnerable and with low worth. Pt did well in processing and was able to reduce JAYA from 6 to 0 as well as increase JAYA to 7. Pt was able to identify that she is strong. Visit Diagnosis:   Post Traumatic Stress Disorder -Pt is reporting intrusive and arousal symptoms. Pt is experiencing repeated dreams, memories, and flashbacks of the stressful experience. Pt is avoiding memories and external reminders. Pt has strong physical and emotional reactions to these exposures. Pt reports strong negative beliefs about herself. Pt reports self-blame, strong feelings such as fear horror or anger, los of interest in things she used to enjoy, some self-isolation, difficulty experiencing positive emotion, irritability, diffiuclty concentrating, and difficulty sleeping.       History from Medical Record:        Diagnosis Date    Abnormal Pap smear of cervix     ADHD (attention deficit hyperactivity disorder)     Anxiety     Depression     Fibromyalgia     Hyperlipidemia     Obesity     Unspecified sleep apnea     UTI (urinary tract infection)      Medications:   Current Outpatient Medications   Medication Sig Dispense Refill    pantoprazole (PROTONIX) 40 MG tablet take 1 tablet by mouth once daily 90 tablet 0    sertraline (ZOLOFT) 100 MG tablet Take 1.5 tablets by mouth daily 45 tablet 5    cyanocobalamin 1000 MCG/ML injection inject 1 milliliter ( 1000 MCG ) intramuscularly EVERY 30 DAYS 1 mL 11    busPIRone (BUSPAR) 7.5 MG tablet take 1 tablet by mouth twice a day if needed for anxiety 60 tablet 0    hydrOXYzine (ATARAX) 10 MG tablet Take 1 tablet by mouth 3 times daily as needed for Anxiety 90 tablet 2    traZODone (DESYREL) 50 MG tablet Take 0.5 tablets by mouth nightly as needed for Sleep 15 tablet 2    topiramate (TOPAMAX) 100 MG tablet Take 1 tablet by mouth 2 times daily 60 tablet 3    diclofenac sodium (VOLTAREN) 1 % GEL apply 4 grams topically four times a day AS NEEDED FOR PAIN 500 g 5    amphetamine-dextroamphetamine (ADDERALL) 15 MG tablet take 1 tablet by mouth twice a day 60 tablet 0    levETIRAcetam (KEPPRA) 750 MG tablet take 1 tablet by mouth twice a day      fluticasone (FLONASE) 50 MCG/ACT nasal spray 1 spray by Nasal route daily 16 g 5    Pantoprazole Sodium (PROTONIX PO) Take by mouth      loratadine (CLARITIN) 10 MG tablet take 1 tablet by mouth once daily if needed 30 tablet 5    Mirabegron ER (MYRBETRIQ) 25 MG TB24 Take 1 tablet by mouth daily 14 tablet 0    IRON PO       Vitamin D (CHOLECALCIFEROL) 1000 UNITS CAPS capsule Take 1,000 Units by mouth daily. CALCIUM CITRATE PO Take 1,500 mg by mouth daily. acetaminophen (TYLENOL) 325 MG tablet Take 650 mg by mouth every 6 hours as needed. No current facility-administered medications for this encounter.        Social History:   Social History     Socioeconomic History    Marital status:      Spouse name: Not on file    Number of children: Not on file    Years of education: Not on file    Highest education level: Not on file   Occupational History    Not on file   Tobacco Use    Smoking status: Former     Packs/day: 0.00     Years: 1.00     Pack years: 0.00     Types: Cigarettes     Quit date: 1994     Years since quittin.1    Smokeless tobacco: Never   Substance and Sexual Activity    Alcohol use: No    Drug use: No    Sexual activity: Not on file   Other Topics Concern    Not on file   Social History Narrative    Not on file     Social Determinants of Health     Financial Resource Strain: Low Risk     Difficulty of Paying Living Expenses: Not hard at all   Food Insecurity: No Food Insecurity    Worried About Running Out of Food in the Last Year: Never true    Ran Out of Food in the Last Year: Never true   Transportation Needs: Not on file   Physical Activity: Not on file   Stress: Not on file   Social Connections: Not on file   Intimate Partner Violence: Not on file   Housing Stability: Not on file       TOBACCO:   reports that she quit smoking about 28 years ago. She has never used smokeless tobacco.  ETOH:   reports no history of alcohol use. Family History:   Family History   Problem Relation Age of Onset    Diabetes Mother     High Blood Pressure Mother     Heart Disease Mother     Heart Disease Father     Cancer Father         prostate    Cancer Paternal Grandmother         breast cancer at age 80           Pt interventions:  Provided education, Cognitive strategies to target negative thoughts including cognitive coping, and Identified maladaptive thoughts, EMDR        PLAN:   EMDR  Explore inner strength, closet, and situation with sister telling daughter positive things       Patient scheduled to return on:   Thursday 11/3/2022 at 5 PM    Were changes or additions made to the treatment plan today?   YES []   NO [x]  Noted changes:

## 2022-11-03 ENCOUNTER — HOSPITAL ENCOUNTER (OUTPATIENT)
Dept: PSYCHIATRY | Age: 52
Setting detail: THERAPIES SERIES
Discharge: HOME | End: 2022-11-03

## 2022-11-10 ENCOUNTER — HOSPITAL ENCOUNTER (OUTPATIENT)
Dept: PSYCHIATRY | Age: 52
Setting detail: THERAPIES SERIES
Discharge: HOME | End: 2022-11-10
Payer: COMMERCIAL

## 2022-11-10 PROCEDURE — 90837 PSYTX W PT 60 MINUTES: CPT | Performed by: SOCIAL WORKER

## 2022-11-11 NOTE — PROGRESS NOTES
[facility] Therapy Note in Person  [address]   11/10/22  5:15 PM  Conrad Chowdhury  1970  [id number]    Time spent with Patient: 60 minutes      Pt [facility] Discussed with patient model of service to include the limits of confidentiality (i.e. abuse reporting, suicide intervention, etc.) and short-term intervention focused approach. Pt indicated understanding. S:  Pt and therapist engaged in check in and processed pt's thoughts/feelings. Therapist gave pt space to express thoughts and feelings, and identify any needs. Therapist and pt engaged in EMDR to process recent experience at work. EMDR Treatment     Negative Belief: I don't matter   Positive Belief:  I'm important   Target: Being skipped over for lunch   Present  Initial VOC: 1  Emotions: Angry  Initial JAYA:  8  Body Location: stomach, throat     Outcome:  Unfinished  Ending JAYA: 3  Ending VOC: 4  Clear Body Scan? N/A  Closure: Resources   Client Stability Good    Other Notes:    Pt identified this situation is a trigger for her F always taking her sister on outings but not her. Pt felt last and was never \"picked\" by her F. Pt thinks \"no one picked me. \"  Pt needs to target this memory of F before JAYA for this target can be reduced more.              O:  MSE:     Appearance    alert, cooperative, crying, mild distress  Appetite normal  Sleep disturbance Yes  Fatigue Yes  Loss of pleasure No  Impulsive behavior No  Speech    normal rate, normal volume, and well articulated  Mood    Demoralization  Affect    normal affect  Thought Content    worthlessness, cognitive distortions, and all or nothing thinking  Thought Process    linear, goal directed, and coherent  Associations    logical connections  Insight    Good  Judgment    Intact  Orientation    oriented to person, place, time, and general circumstances  Memory    recent and remote memory intact  Attention/Concentration    intact  Morbid ideation No  Suicide Assessment    no suicidal ideation    A:  Pt presented in a demoralized mood but with normal affect. Pt's speech was normal and therapist was able to interject and speak when needed. Pt participated in EMDR for a present target and was able to identify likely touchstone memory of her F \"not picking her\" and only spending time with her sister. Pt struggles with thoughts of \"you're last\" and not being picked. Pt was tearful and was able to emote appropriately during EMDR processing. Visit Diagnosis:   Post Traumatic Stress Disorder -Pt is reporting intrusive and arousal symptoms. Pt is experiencing repeated dreams, memories, and flashbacks of the stressful experience. Pt is avoiding memories and external reminders. Pt has strong physical and emotional reactions to these exposures. Pt reports strong negative beliefs about herself. Pt reports self-blame, strong feelings such as fear horror or anger, los of interest in things she used to enjoy, some self-isolation, difficulty experiencing positive emotion, irritability, diffiuclty concentrating, and difficulty sleeping.          History from Medical Record:        Diagnosis Date    Abnormal Pap smear of cervix     ADHD (attention deficit hyperactivity disorder)     Anxiety     Depression     Fibromyalgia     Hyperlipidemia     Obesity     Unspecified sleep apnea     UTI (urinary tract infection)      Medications:   Current Outpatient Medications   Medication Sig Dispense Refill    pantoprazole (PROTONIX) 40 MG tablet take 1 tablet by mouth once daily 90 tablet 0    sertraline (ZOLOFT) 100 MG tablet Take 1.5 tablets by mouth daily 45 tablet 5    cyanocobalamin 1000 MCG/ML injection inject 1 milliliter ( 1000 MCG ) intramuscularly EVERY 30 DAYS 1 mL 11    busPIRone (BUSPAR) 7.5 MG tablet take 1 tablet by mouth twice a day if needed for anxiety 60 tablet 0    hydrOXYzine (ATARAX) 10 MG tablet hard at all   Food Insecurity: No Food Insecurity    Worried About Running Out of Food in the Last Year: Never true    Ran Out of Food in the Last Year: Never true   Transportation Needs: Not on file   Physical Activity: Not on file   Stress: Not on file   Social Connections: Not on file   Intimate Partner Violence: Not on file   Housing Stability: Not on file       TOBACCO:   reports that she quit smoking about 28 years ago. She has never used smokeless tobacco.  ETOH:   reports no history of alcohol use. Family History:   Family History   Problem Relation Age of Onset    Diabetes Mother     High Blood Pressure Mother     Heart Disease Mother     Heart Disease Father     Cancer Father         prostate    Cancer Paternal Grandmother         breast cancer at age 80           Pt interventions:  Provided education, Established rapport, Conducted functional assessment, Supportive techniques, and Identified maladaptive thoughts, EMDR        PLAN:   Target F \"not picking her\"       Patient scheduled to return on:   Thursday 11/17/2022 at 5:15 PM    Were changes or additions made to the treatment plan today?   YES []   NO [x]  Noted changes:

## 2022-11-21 ENCOUNTER — HOSPITAL ENCOUNTER (OUTPATIENT)
Dept: PSYCHIATRY | Age: 52
Setting detail: THERAPIES SERIES
Discharge: HOME | End: 2022-11-21
Payer: COMMERCIAL

## 2022-11-21 PROCEDURE — 90837 PSYTX W PT 60 MINUTES: CPT | Performed by: SOCIAL WORKER

## 2022-11-22 NOTE — PROGRESS NOTES
[facility] Therapy Note in [address], [address]   11/21/2022  11:00 AM  Rajesh Pizano  1970  [id number]    Time spent with Patient: 60 minutes      Pt [facility]. Discussed with patient model of service to include the limits of confidentiality (i.e. abuse reporting, suicide intervention, etc.) and short-term intervention focused approach. Pt indicated understanding. S:  Pt and therapist engaged in check in. Therapist offered pt emotional support and pt and therapist processed upcoming holiday. Therapist and pt engaged in EMDR. EMDR Treatment     Negative Belief: I'm unlovable  Positive Belief:  I can be loved   Target: Pt's F taking her sister [facility] places but not pt   Past  Initial VOC: 2  Emotions: sad, angry   Initial JAYA: 8  Body Location: Throat     Outcome:  Completed  Ending JAYA: 0  Ending VOC: 7  Clear Body Scan? Yes   Closure: Resources  Client Stability: good     Other Notes:  Pt expressed belief that she may self-sabotage when it comes to relationships with other people       O:  MSE:     Appearance    alert, cooperative, crying, mild distress  Appetite normal  Sleep disturbance No  Fatigue No  Loss of pleasure No  Impulsive behavior No  Speech    normal rate, normal volume, and well articulated  Mood    Normal  Affect    normal affect  Thought Content    worthlessness and cognitive distortions  Thought Process    linear, goal directed, and coherent  Associations    logical connections  Insight    Good  Judgment    Intact  Orientation    oriented to person, place, time, and general circumstances  Memory    recent and remote memory intact  Attention/Concentration    intact  Morbid ideation No  Suicide Assessment    no suicidal ideation    A:  Pt presented with normal mood and affect. While processing through EMDR pt emoted many emotions and expressed herself through tears.   Pt was able to release a lot of built up emotions. Pt displayed good insight into her thoughts and was able to begin seeing herself more positively. Pt identified that she will sometimes self-sabotage to prove to herself that she is unlovable. Pt reduced JAYA to 0 and increased VOC to 7. Pt and therapist to explore this next week. Visit Diagnosis:   Post Traumatic Stress Disorder -Pt is reporting intrusive and arousal symptoms. Pt is experiencing repeated dreams, memories, and flashbacks of the stressful experience. Pt is avoiding memories and external reminders. Pt has strong physical and emotional reactions to these exposures. Pt reports strong negative beliefs about herself. Pt reports self-blame, strong feelings such as fear horror or anger, los of interest in things she used to enjoy, some self-isolation, difficulty experiencing positive emotion, irritability, diffiuclty concentrating, and difficulty sleeping.       History from Medical Record:        Diagnosis Date    Abnormal Pap smear of cervix     ADHD (attention deficit hyperactivity disorder)     Anxiety     Depression     Fibromyalgia     Hyperlipidemia     Obesity     Unspecified sleep apnea     UTI (urinary tract infection)      Medications:   Current Outpatient Medications   Medication Sig Dispense Refill    pantoprazole (PROTONIX) 40 MG tablet take 1 tablet by mouth once daily 90 tablet 0    sertraline (ZOLOFT) 100 MG tablet Take 1.5 tablets by mouth daily 45 tablet 5    cyanocobalamin 1000 MCG/ML injection inject 1 milliliter ( 1000 MCG ) intramuscularly EVERY 30 DAYS 1 mL 11    busPIRone (BUSPAR) 7.5 MG tablet take 1 tablet by mouth twice a day if needed for anxiety 60 tablet 0    hydrOXYzine (ATARAX) 10 MG tablet Take 1 tablet by mouth 3 times daily as needed for Anxiety 90 tablet 2    traZODone (DESYREL) 50 MG tablet Take 0.5 tablets by mouth nightly as needed for Sleep 15 tablet 2    topiramate (TOPAMAX) 100 MG tablet Take 1 tablet by mouth 2 times daily 60 tablet 3 diclofenac sodium (VOLTAREN) 1 % GEL apply 4 grams topically four times a day AS NEEDED FOR PAIN 500 g 5    amphetamine-dextroamphetamine (ADDERALL) 15 MG tablet take 1 tablet by mouth twice a day 60 tablet 0    levETIRAcetam (KEPPRA) 750 MG tablet take 1 tablet by mouth twice a day      fluticasone (FLONASE) 50 MCG/ACT nasal spray 1 spray by Nasal route daily 16 g 5    Pantoprazole Sodium (PROTONIX PO) Take by mouth      loratadine (CLARITIN) 10 MG tablet take 1 tablet by mouth once daily if needed 30 tablet 5    Mirabegron ER (MYRBETRIQ) 25 MG TB24 Take 1 tablet by mouth daily 14 tablet 0    IRON PO       Vitamin D (CHOLECALCIFEROL) 1000 UNITS CAPS capsule Take 1,000 Units by mouth daily. CALCIUM CITRATE PO Take 1,500 mg by mouth daily. acetaminophen (TYLENOL) 325 MG tablet Take 650 mg by mouth every 6 hours as needed. No current facility-administered medications for this encounter.        Social History:   Social History     Socioeconomic History    Marital status:      Spouse name: Not on file    Number of children: Not on file    Years of education: Not on file    Highest education level: Not on file   Occupational History    Not on file   Tobacco Use    Smoking status: Former     Packs/day: 0.00     Years: 1.00     Pack years: 0.00     Types: Cigarettes     Quit date: 1994     Years since quittin.2    Smokeless tobacco: Never   Substance and Sexual Activity    Alcohol use: No    Drug use: No    Sexual activity: Not on file   Other Topics Concern    Not on file   Social History Narrative    Not on file     Social Determinants of Health     Financial Resource Strain: Low Risk     Difficulty of Paying Living Expenses: Not hard at all   Food Insecurity: No Food Insecurity    Worried About Running Out of Food in the Last Year: Never true    Ran Out of Food in the Last Year: Never true   Transportation Needs: Not on file   Physical Activity: Not on file   Stress: Not on file   Social Connections: Not on file   Intimate Partner Violence: Not on file   Housing Stability: Not on file       TOBACCO:   reports that she quit smoking about 28 years ago. She has never used smokeless tobacco.  ETOH:   reports no history of alcohol use. Family History:   Family History   Problem Relation Age of Onset    Diabetes Mother     High Blood Pressure Mother     Heart Disease Mother     Heart Disease Father     Cancer Father         prostate    Cancer Paternal Grandmother         breast cancer at age 80           Pt interventions:  Established rapport, Supportive techniques, and Identified maladaptive thoughts, EMDR        PLAN:   EMDR  Self-sabotage      Patient scheduled to return on:   Thursday 12/1/2022 at 5 PM    Were changes or additions made to the treatment plan today?   YES []   NO [x]  Noted changes:

## 2022-12-01 ENCOUNTER — HOSPITAL ENCOUNTER (OUTPATIENT)
Dept: PSYCHIATRY | Age: 52
Setting detail: THERAPIES SERIES
Discharge: HOME | End: 2022-12-01

## 2022-12-08 ENCOUNTER — HOSPITAL ENCOUNTER (OUTPATIENT)
Dept: PSYCHIATRY | Age: 52
Setting detail: THERAPIES SERIES
Discharge: HOME | End: 2022-12-08

## 2022-12-09 NOTE — PROGRESS NOTES
[facility] Therapy Note in Person  Mariah Sena, [address]   12/8/2022  5:15 PM  Tadeo Re  1970  [id number]    Time spent with Patient: 60 minutes      Pt was provided informed consent for the [facility]. Discussed with patient model of service to include the limits of confidentiality (i.e. abuse reporting, suicide intervention, etc.) and short-term intervention focused approach. Pt indicated understanding. S:  Therapist and pt engaged in check in. Therapist offered pt emotional support and helped her process work and the current situation with her daughter. Therapist and pt engaged in EMDR. EMDR Treatment     Negative Belief: I don't matter   Positive Belief:  I matter  Target: Christel not telling pt about her engagement   Past/Present/Future  Initial VOC: 1  Emotions: Distraught   Initial JAYA: 6  Body Location: Throat     Outcome:  Unfinished   Ending JAYA: 0  Ending VOC: 4   Clear Body Scan? N/A  Closure: Resources   Client Stability: Good     Other Notes:    Pt struggled to accept that she matters if her daughter doesn't thinks she matters. Pt would benefit from exploring other targets with daughter. Therapist used grounding techniques during session when pt started to exit her tolerance window.           O:  MSE:     Appearance    alert, cooperative, crying, mild distress  Appetite normal  Sleep disturbance No  Fatigue Yes  Loss of pleasure No  Impulsive behavior No  Speech    pressured  Mood    Anxious  Affect    anxiety  Thought Content    worthlessness, cognitive distortions, and all or nothing thinking  Thought Process    tangential  Associations    tangential connections  Insight    Good  Judgment    Intact  Orientation    oriented to person, place, time, and general circumstances  Memory    recent and remote memory intact  Attention/Concentration    intact  Morbid ideation No  Suicide Assessment    no suicidal ideation    A:  Pt presented as anxious and defeated. Pt has been struggling with her self-worth but has been able to reframe some thoughts and reduce personalization. Pt was able to reduce her JAYA to a 0, but did not increase her VOC above a 4. Pt does not feel distressed about this situation but does not believe completely that she matters. Pt would benefit from targeting other memories with her daughter. Visit Diagnosis:   Post Traumatic Stress Disorder -Pt is reporting intrusive and arousal symptoms. Pt is experiencing repeated dreams, memories, and flashbacks of the stressful experience. Pt is avoiding memories and external reminders. Pt has strong physical and emotional reactions to these exposures. Pt reports strong negative beliefs about herself. Pt reports self-blame, strong feelings such as fear horror or anger, los of interest in things she used to enjoy, some self-isolation, difficulty experiencing positive emotion, irritability, diffiuclty concentrating, and difficulty sleeping.          History from Medical Record:        Diagnosis Date    Abnormal Pap smear of cervix     ADHD (attention deficit hyperactivity disorder)     Anxiety     Depression     Fibromyalgia     Hyperlipidemia     Obesity     Unspecified sleep apnea     UTI (urinary tract infection)      Medications:   Current Outpatient Medications   Medication Sig Dispense Refill    pantoprazole (PROTONIX) 40 MG tablet take 1 tablet by mouth once daily 90 tablet 0    sertraline (ZOLOFT) 100 MG tablet Take 1.5 tablets by mouth daily 45 tablet 5    cyanocobalamin 1000 MCG/ML injection inject 1 milliliter ( 1000 MCG ) intramuscularly EVERY 30 DAYS 1 mL 11    busPIRone (BUSPAR) 7.5 MG tablet take 1 tablet by mouth twice a day if needed for anxiety 60 tablet 0    hydrOXYzine (ATARAX) 10 MG tablet Take 1 tablet by mouth 3 times daily as needed for Anxiety 90 tablet 2    traZODone (DESYREL) 50 MG tablet Take 0.5 tablets by mouth nightly as needed for Sleep 15 tablet 2    topiramate (TOPAMAX) 100 MG tablet Take 1 tablet by mouth 2 times daily 60 tablet 3    diclofenac sodium (VOLTAREN) 1 % GEL apply 4 grams topically four times a day AS NEEDED FOR PAIN 500 g 5    amphetamine-dextroamphetamine (ADDERALL) 15 MG tablet take 1 tablet by mouth twice a day 60 tablet 0    levETIRAcetam (KEPPRA) 750 MG tablet take 1 tablet by mouth twice a day      fluticasone (FLONASE) 50 MCG/ACT nasal spray 1 spray by Nasal route daily 16 g 5    Pantoprazole Sodium (PROTONIX PO) Take by mouth      loratadine (CLARITIN) 10 MG tablet take 1 tablet by mouth once daily if needed 30 tablet 5    Mirabegron ER (MYRBETRIQ) 25 MG TB24 Take 1 tablet by mouth daily 14 tablet 0    IRON PO       Vitamin D (CHOLECALCIFEROL) 1000 UNITS CAPS capsule Take 1,000 Units by mouth daily. CALCIUM CITRATE PO Take 1,500 mg by mouth daily. acetaminophen (TYLENOL) 325 MG tablet Take 650 mg by mouth every 6 hours as needed. No current facility-administered medications for this encounter.        Social History:   Social History     Socioeconomic History    Marital status:      Spouse name: Not on file    Number of children: Not on file    Years of education: Not on file    Highest education level: Not on file   Occupational History    Not on file   Tobacco Use    Smoking status: Former     Packs/day: 0.00     Years: 1.00     Pack years: 0.00     Types: Cigarettes     Quit date: 1994     Years since quittin.2    Smokeless tobacco: Never   Substance and Sexual Activity    Alcohol use: No    Drug use: No    Sexual activity: Not on file   Other Topics Concern    Not on file   Social History Narrative    Not on file     Social Determinants of Health     Financial Resource Strain: Low Risk     Difficulty of Paying Living Expenses: Not hard at all   Food Insecurity: No Food Insecurity    Worried About [address] [employer] in the Last Year: Never true    [address] in the Last Year: Never true   Transportation Needs: Not on file   Physical Activity: Not on file   Stress: Not on file   Social Connections: Not on file   Intimate Partner Violence: Not on file   Housing Stability: Not on file       TOBACCO:   reports that she quit smoking about 28 years ago. She has never used smokeless tobacco.  ETOH:   reports no history of alcohol use. Family History:   Family History   Problem Relation Age of Onset    Diabetes Mother     High Blood Pressure Mother     Heart Disease Mother     Heart Disease Father     Cancer Father         prostate    Cancer Paternal Grandmother         breast cancer at age 80           Pt interventions:  Trained in relaxation strategies, Provided education, Provided education on PTSD symptoms and treatment options for evidence-based treatment (Cognitive Processing Therapy and Prolonged Exposure), and Identified maladaptive thoughts, EMDR        PLAN:   EMDR  Target memory with daughter       Patient scheduled to return on:   Thursday 12/15/2022 at 5 PM    Were changes or additions made to the treatment plan today?   YES []   NO []  Noted changes:

## 2022-12-15 ENCOUNTER — HOSPITAL ENCOUNTER (OUTPATIENT)
Dept: PSYCHIATRY | Age: 52
Setting detail: THERAPIES SERIES
Discharge: HOME | End: 2022-12-15

## 2022-12-16 NOTE — PROGRESS NOTES
Pt was able to redirect herself when needed but displayed guilt and shame at getting off topic and being \"too much. \"  Pt's speech was pressured and tangential and struggled to stay on topic. Pt is experiencing intrusive memories of situations when her daughter has hurt her and negative thoughts such as not being good enough and being stupid. Pt was able to reframe some thoughts and let go of some difficult emotions. Pt's updated PCL5 score was a 48 which is an increase from the previous but still a decrease from pcl done at assessment (54)     Visit Diagnosis:     Post Traumatic Stress Disorder -Pt is reporting intrusive and arousal symptoms. Pt is experiencing repeated dreams, memories, and flashbacks of the stressful experience. Pt is avoiding memories and external reminders. Pt has strong physical and emotional reactions to these exposures. Pt reports strong negative beliefs about herself.   Pt reports self-blame, strong feelings such as fear horror or anger, los of interest in things she used to enjoy, some self-isolation, difficulty experiencing positive emotion, irritability, diffiuclty concentrating, and difficulty sleeping    History from Medical Record:        Diagnosis Date    Abnormal Pap smear of cervix     ADHD (attention deficit hyperactivity disorder)     Anxiety     Depression     Fibromyalgia     Hyperlipidemia     Obesity     Unspecified sleep apnea     UTI (urinary tract infection)      Medications:   Current Outpatient Medications   Medication Sig Dispense Refill    sertraline (ZOLOFT) 100 MG tablet take 1 AND 1/2 TABLETS by mouth daily 45 tablet 5    pantoprazole (PROTONIX) 40 MG tablet take 1 tablet by mouth once daily 90 tablet 0    cyanocobalamin 1000 MCG/ML injection inject 1 milliliter ( 1000 MCG ) intramuscularly EVERY 30 DAYS 1 mL 11    busPIRone (BUSPAR) 7.5 MG tablet take 1 tablet by mouth twice a day if needed for anxiety 60 tablet 0    hydrOXYzine (ATARAX) 10 MG tablet Take 1 tablet by mouth 3 times daily as needed for Anxiety 90 tablet 2    traZODone (DESYREL) 50 MG tablet Take 0.5 tablets by mouth nightly as needed for Sleep 15 tablet 2    topiramate (TOPAMAX) 100 MG tablet Take 1 tablet by mouth 2 times daily 60 tablet 3    diclofenac sodium (VOLTAREN) 1 % GEL apply 4 grams topically four times a day AS NEEDED FOR PAIN 500 g 5    amphetamine-dextroamphetamine (ADDERALL) 15 MG tablet take 1 tablet by mouth twice a day 60 tablet 0    levETIRAcetam (KEPPRA) 750 MG tablet take 1 tablet by mouth twice a day      fluticasone (FLONASE) 50 MCG/ACT nasal spray 1 spray by Nasal route daily 16 g 5    Pantoprazole Sodium (PROTONIX PO) Take by mouth      loratadine (CLARITIN) 10 MG tablet take 1 tablet by mouth once daily if needed 30 tablet 5    Mirabegron ER (MYRBETRIQ) 25 MG TB24 Take 1 tablet by mouth daily 14 tablet 0    IRON PO       Vitamin D (CHOLECALCIFEROL) 1000 UNITS CAPS capsule Take 1,000 Units by mouth daily. CALCIUM CITRATE PO Take 1,500 mg by mouth daily. acetaminophen (TYLENOL) 325 MG tablet Take 650 mg by mouth every 6 hours as needed. No current facility-administered medications for this encounter.        Social History:   Social History     Socioeconomic History    Marital status:      Spouse name: Not on file    Number of children: Not on file    Years of education: Not on file    Highest education level: Not on file   Occupational History    Not on file   Tobacco Use    Smoking status: Former     Packs/day: 0.00     Years: 1.00     Pack years: 0.00     Types: Cigarettes     Quit date: 1994     Years since quittin.3    Smokeless tobacco: Never   Substance and Sexual Activity    Alcohol use: No    Drug use: No    Sexual activity: Not on file   Other Topics Concern    Not on file   Social History Narrative    Not on file     Social Determinants of Health     Financial Resource Strain: Low Risk     Difficulty of Paying Living Expenses: Not hard at all   Food Insecurity: No Food Insecurity    Worried About Running Out of Food in the Last Year: Never true    Ran Out of Food in the Last Year: Never true   Transportation Needs: Not on file   Physical Activity: Not on file   Stress: Not on file   Social Connections: Not on file   Intimate Partner Violence: Not on file   Housing Stability: Not on file       TOBACCO:   reports that she quit smoking about 28 years ago. She has never used smokeless tobacco.  ETOH:   reports no history of alcohol use. Family History:   Family History   Problem Relation Age of Onset    Diabetes Mother     High Blood Pressure Mother     Heart Disease Mother     Heart Disease Father     Cancer Father         prostate    Cancer Paternal Grandmother         breast cancer at age [address]           Pt interventions:  Trained in relaxation strategies, Provided education, Provided education on PTSD symptoms and treatment options for evidence-based treatment (Cognitive Processing Therapy and Prolonged Exposure), Motivational Interviewing to determine importance and readiness for change, Discussed potential barriers to change, Established rapport, Conducted functional assessment, Deville-setting to identify pt's primary goals for [facility] visit / overall health, Supportive techniques, and Identified maladaptive thoughts        PLAN:   Target \"trying to fix pool\"- neg thought: I'm stupid       Patient scheduled to return on:   Thursday 12/22/22 at 5 PM    Were changes or additions made to the treatment plan today?   YES []   NO []  Noted changes:

## 2022-12-22 ENCOUNTER — HOSPITAL ENCOUNTER (OUTPATIENT)
Dept: PSYCHIATRY | Age: 52
Setting detail: THERAPIES SERIES
Discharge: HOME | End: 2022-12-22

## 2022-12-22 NOTE — PROGRESS NOTES
[facility] Therapy Note in [facility], [address]   12/22/2022  1:00 PM  Amparo Parents  1970  [id number]    Time spent with Patient: 60 minutes      Pt was provided informed consent for the [address] [place], [place] [address]. Discussed with patient model of service to include the limits of confidentiality (i.e. abuse reporting, suicide intervention, etc.) and short-term intervention focused approach. Pt indicated understanding. S:  Therapist met with pt for session earlier than usual time as pt had the day off. Pt presented as distraught and anxious and was given space to process and emote. Pt reports she texted her daughter asking if she could go to the wedding but her daughter did not respond, and pt then found out that her daughter got  yesterday. Therapist offered emotional support and processed this with pt, who was very emotional.  Therapist used cognitive skills as well to help pt process and reframe negative thoughts and therapist and pt processed how to navigate relationships with adult children. Therapist and pt also discussed setting boundaries with coworkers and her daughter and how to let go of guilt if pt pulls back from relationship with daughter because of not being invited to her wedding. Therapist and pt also processed situations from the past.  Pt denied any suicidal ideation and therapist and pt discussed coping skills to help with overwhelming emotions.       O:  MSE:     Appearance    alert, crying, moderate distress  Appetite normal  Sleep disturbance Yes  Fatigue No  Loss of pleasure No  Impulsive behavior No  Speech    normal rate, normal volume, and well articulated  Mood    Anxious  Worthless  Affect    anxiety  Thought Content    hopelessness, helplessness, worthlessness, intrusive thoughts, cognitive distortions, and all or nothing thinking  Thought Process    linear, goal directed, and coherent  Associations    logical connections  Insight Good  Judgment    Intact  Orientation    oriented to person, place, time, and general circumstances  Memory    recent and remote memory intact  Attention/Concentration    intact  Morbid ideation No  Suicide Assessment    no suicidal ideation    A:  Due to pt's daughter getting  without pt present, pt was experiencing significant distraught emotions. Pt and therapist did not process using EMDR today as pt needed to externally process. Pt was able to regulate her emotions as session progressed and identify a possible need to set boundaries with daughter and to pull back. Pt was able to recognize a pattern in her daughter's behavior and weakened her belief that she is unwanted. Visit Diagnosis:   Post Traumatic Stress Disorder -Pt is reporting intrusive and arousal symptoms. Pt is experiencing repeated dreams, memories, and flashbacks of the stressful experience. Pt is avoiding memories and external reminders. Pt has strong physical and emotional reactions to these exposures. Pt reports strong negative beliefs about herself.   Pt reports self-blame, strong feelings such as fear horror or anger, los of interest in things she used to enjoy, some self-isolation, difficulty experiencing positive emotion, irritability, diffiuclty concentrating, and difficulty sleeping      History from Medical Record:        Diagnosis Date    Abnormal Pap smear of cervix     ADHD (attention deficit hyperactivity disorder)     Anxiety     Depression     Fibromyalgia     Hyperlipidemia     Obesity     Unspecified sleep apnea     UTI (urinary tract infection)      Medications:   Current Outpatient Medications   Medication Sig Dispense Refill    sertraline (ZOLOFT) 100 MG tablet take 1 AND 1/2 TABLETS by mouth daily 45 tablet 5    pantoprazole (PROTONIX) 40 MG tablet take 1 tablet by mouth once daily 90 tablet 0    cyanocobalamin 1000 MCG/ML injection inject 1 milliliter ( 1000 MCG ) intramuscularly EVERY 30 DAYS 1 mL 11 busPIRone (BUSPAR) 7.5 MG tablet take 1 tablet by mouth twice a day if needed for anxiety 60 tablet 0    hydrOXYzine (ATARAX) 10 MG tablet Take 1 tablet by mouth 3 times daily as needed for Anxiety 90 tablet 2    traZODone (DESYREL) 50 MG tablet Take 0.5 tablets by mouth nightly as needed for Sleep 15 tablet 2    topiramate (TOPAMAX) 100 MG tablet Take 1 tablet by mouth 2 times daily 60 tablet 3    diclofenac sodium (VOLTAREN) 1 % GEL apply 4 grams topically four times a day AS NEEDED FOR PAIN 500 g 5    amphetamine-dextroamphetamine (ADDERALL) 15 MG tablet take 1 tablet by mouth twice a day 60 tablet 0    levETIRAcetam (KEPPRA) 750 MG tablet take 1 tablet by mouth twice a day      fluticasone (FLONASE) 50 MCG/ACT nasal spray 1 spray by Nasal route daily 16 g 5    Pantoprazole Sodium (PROTONIX PO) Take by mouth      loratadine (CLARITIN) 10 MG tablet take 1 tablet by mouth once daily if needed 30 tablet 5    Mirabegron ER (MYRBETRIQ) 25 MG TB24 Take 1 tablet by mouth daily 14 tablet 0    IRON PO       Vitamin D (CHOLECALCIFEROL) 1000 UNITS CAPS capsule Take 1,000 Units by mouth daily. CALCIUM CITRATE PO Take 1,500 mg by mouth daily. acetaminophen (TYLENOL) 325 MG tablet Take 650 mg by mouth every 6 hours as needed. No current facility-administered medications for this encounter.        Social History:   Social History     Socioeconomic History    Marital status:      Spouse name: Not on file    Number of children: Not on file    Years of education: Not on file    Highest education level: Not on file   Occupational History    Not on file   Tobacco Use    Smoking status: Former     Packs/day: 0.00     Years: 1.00     Pack years: 0.00     Types: Cigarettes     Quit date: 1994     Years since quittin.3    Smokeless tobacco: Never   Substance and Sexual Activity    Alcohol use: No    Drug use: No    Sexual activity: Not on file   Other Topics Concern    Not on file   Social History Narrative    Not on file     Social Determinants of Health     Financial Resource Strain: Low Risk     Difficulty of Paying Living Expenses: Not hard at all   Food Insecurity: No Food Insecurity    Worried About Running Out of Food in the Last Year: Never true    Ran Out of Food in the Last Year: Never true   Transportation Needs: Not on file   Physical Activity: Not on file   Stress: Not on file   Social Connections: Not on file   Intimate Partner Violence: Not on file   Housing Stability: Not on file       TOBACCO:   reports that she quit smoking about 28 years ago. She has never used smokeless tobacco.  ETOH:   reports no history of alcohol use. Family History:   Family History   Problem Relation Age of Onset    Diabetes Mother     High Blood Pressure Mother     Heart Disease Mother     Heart Disease Father     Cancer Father         prostate    Cancer Paternal Grandmother         breast cancer at age 80           Pt interventions:  Trained in relaxation strategies, Established rapport, Supportive techniques, CBT to target negative thoughts, Cognitive strategies to target distortions including reframing, and Identified maladaptive thoughts        PLAN:   EMDR  Pool target:I'm stupid      Patient scheduled to return on:   Thursday 12/29/22 at 5 PM    Were changes or additions made to the treatment plan today?   YES []   NO [x]  Noted changes:

## 2022-12-29 ENCOUNTER — HOSPITAL ENCOUNTER (OUTPATIENT)
Dept: PSYCHIATRY | Age: 52
Setting detail: THERAPIES SERIES
Discharge: HOME | End: 2022-12-29
Payer: COMMERCIAL

## 2022-12-29 PROCEDURE — 90837 PSYTX W PT 60 MINUTES: CPT | Performed by: SOCIAL WORKER

## 2022-12-30 NOTE — PROGRESS NOTES
[facility], [address]   12/29/22  5:05 PM  Jesus Saliva  1970  [id number]    Time spent with Patient: 60 minutes      Pt was provided informed consent for the [facility]. Discussed with patient model of service to include the limits of confidentiality (i.e. abuse reporting, suicide intervention, etc.) and short-term intervention focused approach. Pt indicated understanding. S:  Pt and therapist engaged in check in and processed holiday and recent interactions with pt's daughter. Pt and therapist discussed relationships and pt's tendencies to make everything her fault, and the connections that has to her Mother. Therapist and pt selected an EMDR target, as pt wanted to one about her mother rather than the pool, and engaged in processing. EMDR Treatment     Negative Belief: I'm stupid  Positive Belief:  I'm smart   Target: Mom yelling about something wrong and \"raging\"   Past  Initial VOC: 1  Emotions: sad  Initial JAYA: 8  Body Location: throat     Outcome:  Unfinished  Ending JAYA: 0  Ending VOC: 4  Clear Body Scan? N/A  Closure: Resources  Client Stability: Good     Other Notes:    Pt was able to identify that she continued to believe the words her M spoke to her when it comes to her other relationships. Pt believes she deserves it when people are mean to her.          O:  MSE:     Appearance    alert, cooperative, mild distress  Appetite normal  Sleep disturbance No  Fatigue Yes  Loss of pleasure Yes  Impulsive behavior No  Speech    normal rate, normal volume, and well articulated  Mood    Worthless  Low self-esteem  Affect    depressed affect  Thought Content    worthlessness, cognitive distortions, and all or nothing thinking  Thought Process    linear, goal directed, and coherent  Associations    logical connections  Insight    Good  Judgment    Intact  Orientation    oriented to person, place, time, and general circumstances  Memory    recent and remote memory intact  Attention/Concentration    intact  Morbid ideation No  Suicide Assessment    no suicidal ideation    A:  Pt presented with low self-esteem and with worthless mood. Pt's affect was depressed. Pt is currently struggling with the fact her daughter got  and did not invite her, and her daughter's actions show she does not want a relationship with pt. Pt is setting boundaries for herself successfully. Pt's speech was normal and allowed for therapist to speak and did not go on tangents. Pt successfully reduced her JAYA but  was unable to bring her VOC up to 7. Therapist and pt to explore further what is keeping the VOC at a 4 and process it. Visit Diagnosis:     Post Traumatic Stress Disorder -Pt is reporting intrusive and arousal symptoms. Pt is experiencing repeated dreams, memories, and flashbacks of the stressful experience. Pt is avoiding memories and external reminders. Pt has strong physical and emotional reactions to these exposures. Pt reports strong negative beliefs about herself. Pt reports self-blame, strong feelings such as fear horror or anger, los of interest in things she used to enjoy, some self-isolation, difficulty experiencing positive emotion, irritability, diffiuclty concentrating, and difficulty sleeping.     History from Medical Record:        Diagnosis Date    Abnormal Pap smear of cervix     ADHD (attention deficit hyperactivity disorder)     Anxiety     Depression     Fibromyalgia     Hyperlipidemia     Obesity     Unspecified sleep apnea     UTI (urinary tract infection)      Medications:   Current Outpatient Medications   Medication Sig Dispense Refill    sertraline (ZOLOFT) 100 MG tablet take 1 AND 1/2 TABLETS by mouth daily 45 tablet 5    pantoprazole (PROTONIX) 40 MG tablet take 1 tablet by mouth once daily 90 tablet 0    cyanocobalamin 1000 MCG/ML injection inject 1 milliliter ( 1000 MCG ) intramuscularly EVERY 30 DAYS 1 mL 11    busPIRone (BUSPAR) 7.5 MG tablet take 1 tablet by mouth twice a day if needed for anxiety 60 tablet 0    hydrOXYzine (ATARAX) 10 MG tablet Take 1 tablet by mouth 3 times daily as needed for Anxiety 90 tablet 2    traZODone (DESYREL) 50 MG tablet Take 0.5 tablets by mouth nightly as needed for Sleep 15 tablet 2    topiramate (TOPAMAX) 100 MG tablet Take 1 tablet by mouth 2 times daily 60 tablet 3    diclofenac sodium (VOLTAREN) 1 % GEL apply 4 grams topically four times a day AS NEEDED FOR PAIN 500 g 5    amphetamine-dextroamphetamine (ADDERALL) 15 MG tablet take 1 tablet by mouth twice a day 60 tablet 0    levETIRAcetam (KEPPRA) 750 MG tablet take 1 tablet by mouth twice a day      fluticasone (FLONASE) 50 MCG/ACT nasal spray 1 spray by Nasal route daily 16 g 5    Pantoprazole Sodium (PROTONIX PO) Take by mouth      loratadine (CLARITIN) 10 MG tablet take 1 tablet by mouth once daily if needed 30 tablet 5    Mirabegron ER (MYRBETRIQ) 25 MG TB24 Take 1 tablet by mouth daily 14 tablet 0    IRON PO       Vitamin D (CHOLECALCIFEROL) 1000 UNITS CAPS capsule Take 1,000 Units by mouth daily. CALCIUM CITRATE PO Take 1,500 mg by mouth daily. acetaminophen (TYLENOL) 325 MG tablet Take 650 mg by mouth every 6 hours as needed. No current facility-administered medications for this encounter.        Social History:   Social History     Socioeconomic History    Marital status:      Spouse name: Not on file    Number of children: Not on file    Years of education: Not on file    Highest education level: Not on file   Occupational History    Not on file   Tobacco Use    Smoking status: Former     Packs/day: 0.00     Years: 1.00     Pack years: 0.00     Types: Cigarettes     Quit date: 1994     Years since quittin.3    Smokeless tobacco: Never   Substance and Sexual Activity    Alcohol use: No    Drug use: No    Sexual activity: Not on file   Other Topics Concern    Not on file Social History Narrative    Not on file     Social Determinants of Health     Financial Resource Strain: Low Risk     Difficulty of Paying Living Expenses: Not hard at all   Food Insecurity: No Food Insecurity    Worried About Running Out of Food in the Last Year: Never true    Ran Out of Food in the Last Year: Never true   Transportation Needs: Not on file   Physical Activity: Not on file   Stress: Not on file   Social Connections: Not on file   Intimate Partner Violence: Not on file   Housing Stability: Not on file       TOBACCO:   reports that she quit smoking about 28 years ago. She has never used smokeless tobacco.  ETOH:   reports no history of alcohol use. Family History:   Family History   Problem Relation Age of Onset    Diabetes Mother     High Blood Pressure Mother     Heart Disease Mother     Heart Disease Father     Cancer Father         prostate    Cancer Paternal Grandmother         breast cancer at age 80           Pt interventions:  Provided education, Provided education on PTSD symptoms and treatment options for evidence-based treatment (Cognitive Processing Therapy and Prolonged Exposure), Established rapport, Supportive techniques, and Identified maladaptive thoughts, EMDR        PLAN:   EMDR continue target      Patient scheduled to return on:   Thursday 1/5/2023 at 5 PM     Were changes or additions made to the treatment plan today?   YES []   NO [x]  Noted changes:

## 2023-01-05 ENCOUNTER — HOSPITAL ENCOUNTER (OUTPATIENT)
Dept: PSYCHIATRY | Age: 53
Setting detail: THERAPIES SERIES
Discharge: HOME | End: 2023-01-05

## 2023-01-06 NOTE — PROGRESS NOTES
[facility] Therapy Note in Person  [address]   1/5/2022  5:15 PM  Nancy Del Castillo  1970  [id number]    Time spent with Patient: 60 minutes      Pt was provided informed consent for the [address] [facility]. Discussed with patient model of service to include the limits of confidentiality (i.e. abuse reporting, suicide intervention, etc.) and short-term intervention focused approach. Pt indicated understanding. S:  Therapist and pt engaged in brief check in. Pt identified some ways in which she was able to work through negative thoughts. Pt and therapist engaged in EMDR. Therapist and pt worked through 2 targets. EMDR Treatment      Negative Belief: I'm stupid  Positive Belief:  I'm smart   Target: Mom yelling about something wrong and \"raging\"   Past  Initial VOC: 4  Initial JAYA: 0     Outcome:  Complete  Ending JAYA: 0  Ending VOC: 7  Clear Body Scan? yes  Client Stability: Good      EMDR Treatment     Negative Belief: I'm a failure   Positive Belief:  I'm good  Target: Tried to help with pool but it broke   Past  Initial VOC: 2  Emotions: Sad  Initial JAYA: 7  Body Location: chest     Outcome:  Completed/Unfinished  Ending AJYA: 0  Ending VOC: 7  Clear Body Scan? Yes   Closure: resources, installed positive thoughts  Client Stability: Good     Other Notes:   Theme of being a failure, will explore more memories surrounding failure next week     O:  MSE:     Appearance    alert, cooperative  Appetite normal  Sleep disturbance Yes  Fatigue Yes  Loss of pleasure No  Impulsive behavior No  Speech    normal rate, normal volume, and well articulated  Mood    Normal  Affect    normal affect  Thought Content    worthlessness, cognitive distortions, and all or nothing thinking  Thought Process    linear, goal directed, and coherent  Associations    logical connections  Insight    Good  Judgment    Intact  Orientation    oriented to person, place, time, and general circumstances  Memory    recent and remote memory intact  Attention/Concentration    intact  Morbid ideation No  Suicide Assessment    no suicidal ideation    A:  Pt presented as tired but with normal mood and affect. Pt's JAYA from previous target remained at a 0 and was able to get her VOC up to a 7. Pt was avoidant initially of next target due to the feelings of failure it brings up but agreed to do it. Pt was able to bring that JAYA down to a 0 as well and the VOC up to a 7. Pt and therapist discussed processing and identified that pt has a theme of feeling as though she is a failure. Therapist and pt will continue to explore this. Visit Diagnosis:     Post Traumatic Stress Disorder -Pt is reporting intrusive and arousal symptoms. Pt is experiencing repeated dreams, memories, and flashbacks of the stressful experience. Pt is avoiding memories and external reminders. Pt has strong physical and emotional reactions to these exposures. Pt reports strong negative beliefs about herself. Pt reports self-blame, strong feelings such as fear horror or anger, los of interest in things she used to enjoy, some self-isolation, difficulty experiencing positive emotion, irritability, diffiuclty concentrating, and difficulty sleeping.        History from Medical Record:        Diagnosis Date    Abnormal Pap smear of cervix     ADHD (attention deficit hyperactivity disorder)     Anxiety     Depression     Fibromyalgia     Hyperlipidemia     Obesity     Unspecified sleep apnea     UTI (urinary tract infection)      Medications:   Current Outpatient Medications   Medication Sig Dispense Refill    pantoprazole (PROTONIX) 40 MG tablet take 1 tablet by mouth once daily 90 tablet 0    sertraline (ZOLOFT) 100 MG tablet take 1 AND 1/2 TABLETS by mouth daily 45 tablet 5    cyanocobalamin 1000 MCG/ML injection inject 1 milliliter ( 1000 MCG ) intramuscularly EVERY 30 DAYS 1 mL 11    busPIRone (BUSPAR) 7.5 MG tablet take 1 tablet by mouth twice a day if needed for anxiety 60 tablet 0    hydrOXYzine (ATARAX) 10 MG tablet Take 1 tablet by mouth 3 times daily as needed for Anxiety 90 tablet 2    traZODone (DESYREL) 50 MG tablet Take 0.5 tablets by mouth nightly as needed for Sleep 15 tablet 2    topiramate (TOPAMAX) 100 MG tablet Take 1 tablet by mouth 2 times daily 60 tablet 3    diclofenac sodium (VOLTAREN) 1 % GEL apply 4 grams topically four times a day AS NEEDED FOR PAIN 500 g 5    amphetamine-dextroamphetamine (ADDERALL) 15 MG tablet take 1 tablet by mouth twice a day 60 tablet 0    levETIRAcetam (KEPPRA) 750 MG tablet take 1 tablet by mouth twice a day      fluticasone (FLONASE) 50 MCG/ACT nasal spray 1 spray by Nasal route daily 16 g 5    Pantoprazole Sodium (PROTONIX PO) Take by mouth      loratadine (CLARITIN) 10 MG tablet take 1 tablet by mouth once daily if needed 30 tablet 5    Mirabegron ER (MYRBETRIQ) 25 MG TB24 Take 1 tablet by mouth daily 14 tablet 0    IRON PO       Vitamin D (CHOLECALCIFEROL) 1000 UNITS CAPS capsule Take 1,000 Units by mouth daily. CALCIUM CITRATE PO Take 1,500 mg by mouth daily. acetaminophen (TYLENOL) 325 MG tablet Take 650 mg by mouth every 6 hours as needed. No current facility-administered medications for this encounter.        Social History:   Social History     Socioeconomic History    Marital status:      Spouse name: Not on file    Number of children: Not on file    Years of education: Not on file    Highest education level: Not on file   Occupational History    Not on file   Tobacco Use    Smoking status: Former     Packs/day: 0.00     Years: 1.00     Pack years: 0.00     Types: Cigarettes     Quit date: 1994     Years since quittin.3    Smokeless tobacco: Never   Substance and Sexual Activity    Alcohol use: No    Drug use: No    Sexual activity: Not on file   Other Topics Concern    Not on file   Social History Narrative    Not on file     Social Determinants of Health     Financial Resource Strain: Low Risk     Difficulty of Paying Living Expenses: Not hard at all   Food Insecurity: No Food Insecurity    Worried About Running Out of Food in the Last Year: Never true    Ran Out of Food in the Last Year: Never true   Transportation Needs: Not on file   Physical Activity: Not on file   Stress: Not on file   Social Connections: Not on file   Intimate Partner Violence: Not on file   Housing Stability: Not on file       TOBACCO:   reports that she quit smoking about 28 years ago. She has never used smokeless tobacco.  ETOH:   reports no history of alcohol use. Family History:   Family History   Problem Relation Age of Onset    Diabetes Mother     High Blood Pressure Mother     Heart Disease Mother     Heart Disease Father     Cancer Father         prostate    Cancer Paternal Grandmother         breast cancer at age 80           Pt interventions:  Provided education, Provided education on PTSD symptoms and treatment options for evidence-based treatment (Cognitive Processing Therapy and Prolonged Exposure), Established rapport, Conducted functional assessment, Supportive techniques, and Identified maladaptive thoughts, EMDR        PLAN:   Continue EMDR, targeting a memory with \"failure\"       Patient scheduled to return on:   Thursday 1/12/2023 at 5 PM    Were changes or additions made to the treatment plan today?   YES []   NO [x]  Noted changes:

## 2023-01-12 ENCOUNTER — HOSPITAL ENCOUNTER (OUTPATIENT)
Dept: PSYCHIATRY | Age: 53
Setting detail: THERAPIES SERIES
Discharge: HOME | End: 2023-01-12

## 2023-01-13 NOTE — PROGRESS NOTES
[facility] Therapy Note in Person  FRANCA Soriano   1/12/2023  5:15 PM  Tejal Dave  1970  [id number]    Time spent with Patient: 60 minutes      Pt was provided [facility]. Discussed with patient model of service to include the limits of confidentiality (i.e. abuse reporting, suicide intervention, etc.) and short-term intervention focused approach.  Pt indicated understanding.      S:  Pt and therapist engaged in check in.  Pt and therapist reviewed last week's target and pt confirmed the JAYA remained at a 0 and VOC remained at a 7.  Therapist and pt explored pt's thoughts of failure and chose a target to process.   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 Yes   Closure: Resources   Client Stability: Good     Other Notes:  Pt identified feeling like she was an overthinkinker, and pt realized she catastrophizes often.          O:  MSE:     Appearance    alert, cooperative  Appetite normal  Sleep disturbance No  Fatigue Yes  Loss of pleasure No  Impulsive behavior No  Speech    normal rate, normal volume, and well articulated  Mood    Demoralization  Affect    Normal  Thought Content    worthlessness and cognitive distortions  Thought Process    goal directed and coherent  Associations    logical connections  Insight    Good  Judgment    Intact  Orientation    oriented to person, place, time, and general circumstances  Memory    recent and remote memory intact  Attention/Concentration    intact  Morbid ideation No  Suicide Assessment    no suicidal ideation    A:  Pt presented as demoralized but with normal mood and affect.  Pt's speech was normal and allowed for therapist to engage  in the conversation when appropriate. Pt was able to identify a memory that is connected to her thoughts on failure and \"always messing up. \"  Pt tends to over-generalize things. Pt was able to bring her JAYA down to 0 and VOC up to 7. Pt was able to identify that she is capable of things and she won't always mess up. Visit Diagnosis:   Post Traumatic Stress Disorder -Pt is reporting intrusive and arousal symptoms. Pt is experiencing repeated dreams, memories, and flashbacks of the stressful experience. Pt is avoiding memories and external reminders. Pt has strong physical and emotional reactions to these exposures. Pt reports strong negative beliefs about herself. Pt reports self-blame, strong feelings such as fear horror or anger, los of interest in things she used to enjoy, some self-isolation, difficulty experiencing positive emotion, irritability, diffiuclty concentrating, and difficulty sleeping.       History from Medical Record:        Diagnosis Date    Abnormal Pap smear of cervix     ADHD (attention deficit hyperactivity disorder)     Anxiety     Depression     Fibromyalgia     Hyperlipidemia     Obesity     Unspecified sleep apnea     UTI (urinary tract infection)      Medications:   Current Outpatient Medications   Medication Sig Dispense Refill    pantoprazole (PROTONIX) 40 MG tablet take 1 tablet by mouth once daily 90 tablet 0    sertraline (ZOLOFT) 100 MG tablet take 1 AND 1/2 TABLETS by mouth daily 45 tablet 5    cyanocobalamin 1000 MCG/ML injection inject 1 milliliter ( 1000 MCG ) intramuscularly EVERY 30 DAYS 1 mL 11    busPIRone (BUSPAR) 7.5 MG tablet take 1 tablet by mouth twice a day if needed for anxiety 60 tablet 0    hydrOXYzine (ATARAX) 10 MG tablet Take 1 tablet by mouth 3 times daily as needed for Anxiety 90 tablet 2    traZODone (DESYREL) 50 MG tablet Take 0.5 tablets by mouth nightly as needed for Sleep 15 tablet 2    topiramate (TOPAMAX) 100 MG tablet Take 1 tablet by mouth 2 times daily 60 tablet 3    diclofenac sodium (VOLTAREN) 1 % GEL apply 4 grams topically four times a day AS NEEDED FOR PAIN 500 g 5    amphetamine-dextroamphetamine (ADDERALL) 15 MG tablet take 1 tablet by mouth twice a day 60 tablet 0    levETIRAcetam (KEPPRA) 750 MG tablet take 1 tablet by mouth twice a day      fluticasone (FLONASE) 50 MCG/ACT nasal spray 1 spray by Nasal route daily 16 g 5    Pantoprazole Sodium (PROTONIX PO) Take by mouth      loratadine (CLARITIN) 10 MG tablet take 1 tablet by mouth once daily if needed 30 tablet 5    Mirabegron ER (MYRBETRIQ) 25 MG TB24 Take 1 tablet by mouth daily 14 tablet 0    IRON PO       Vitamin D (CHOLECALCIFEROL) 1000 UNITS CAPS capsule Take 1,000 Units by mouth daily. CALCIUM CITRATE PO Take 1,500 mg by mouth daily. acetaminophen (TYLENOL) 325 MG tablet Take 650 mg by mouth every 6 hours as needed. No current facility-administered medications for this encounter.        Social History:   Social History     Socioeconomic History    Marital status:      Spouse name: Not on file    Number of children: Not on file    Years of education: Not on file    Highest education level: Not on file   Occupational History    Not on file   Tobacco Use    Smoking status: Former     Packs/day: 0.00     Years: 1.00     Pack years: 0.00     Types: Cigarettes     Quit date: 1994     Years since quittin.3    Smokeless tobacco: Never   Substance and Sexual Activity    Alcohol use: No    Drug use: No    Sexual activity: Not on file   Other Topics Concern    Not on file   Social History Narrative    Not on file     Social Determinants of Health     Financial Resource Strain: Low Risk     Difficulty of Paying Living Expenses: Not hard at all   Food Insecurity: No Food Insecurity    Worried About Running Out of Food in the Last Year: Never true    Ran Out of Food in the Last Year: Never true   Transportation Needs: Not on file   Physical Activity: Not on file Stress: Not on file   Social Connections: Not on file   Intimate Partner Violence: Not on file   Housing Stability: Not on file       TOBACCO:   reports that she quit smoking about 28 years ago. She has never used smokeless tobacco.  ETOH:   reports no history of alcohol use. Family History:   Family History   Problem Relation Age of Onset    Diabetes Mother     High Blood Pressure Mother     Heart Disease Mother     Heart Disease Father     Cancer Father         prostate    Cancer Paternal Grandmother         breast cancer at age 80           Pt interventions:  Provided education, Provided education on PTSD symptoms and treatment options for evidence-based treatment (Cognitive Processing Therapy and Prolonged Exposure), and Identified maladaptive thoughts, EMDR        PLAN:   Target around \"messing up\" and ability to succeed, possibly a childhood memory in school       Patient scheduled to return on:   Thursday 1/19/2023 at 5 PM    Were changes or additions made to the treatment plan today?   YES []   NO [x]  Noted changes:

## 2023-01-19 ENCOUNTER — HOSPITAL ENCOUNTER (OUTPATIENT)
Dept: PSYCHIATRY | Age: 53
Setting detail: THERAPIES SERIES
Discharge: HOME | End: 2023-01-19

## 2023-01-20 NOTE — PROGRESS NOTES
[facility] Therapy [facility] in Person  [address]   1/19/2023  5:15 PM  Priyanka Senait  1970  [id number]    Time spent with Patient: 60 minutes      Pt was provided informed consent for the [facility]. Discussed with patient model of service to include the limits of confidentiality (i.e. abuse reporting, suicide intervention, etc.) and short-term intervention focused approach. Pt indicated understanding. S:  Therapist and pt engaged in check in and processed recent thoughts and feelings. Therapist and pt discussed stress and changes in pt's physical health. Pt and therapist engaged in EMDR to process a target that pt had selected. EMDR Treatment     Negative Belief: I'm not good enough  Positive Belief:  My best isn't good enough   Target: Experience in grade school environment   Past  Initial VOC: 2  Emotions: scared   Initial JAYA: 6  Body Location: throat     Outcome:  Completed  Ending JAYA: 0  Ending VOC: 7  Clear Body Scan? Yes   Closure: resources   Client Stability: Good     Other Notes: Used dandelion imagery to let things go       O:  MSE:     Appearance    alert, cooperative  Appetite normal  Sleep disturbance No  Fatigue Yes  Loss of pleasure No  Impulsive behavior No  Speech    normal rate, normal volume, pressured  Mood    Normal  Affect    normal affect  Thought Content    worthlessness and cognitive distortions  Thought Process    tangential  Associations    tangential connections  Insight    Good  Judgment    Intact  Orientation    oriented to person, place, time, and general circumstances  Memory    recent and remote memory intact  Attention/Concentration    intact  Morbid ideation No  Suicide Assessment    no suicidal ideation    A:  Pt presented in a normal mood and with normal affect. Pt's speech was pressured and slightly tangential but was able to redirect herself.   Pt displayed progress in thinking and reported times in which she has stood up for herself and has not personalized things. In EMDR pt was able to reduce her JAYA from 6 to 0. Pt was also able to identify that her best is enough and developed a resourcing skill of using dandelion imagery. Visit Diagnosis:      Post Traumatic Stress Disorder -Pt is reporting intrusive and arousal symptoms. Pt is experiencing repeated dreams, memories, and flashbacks of the stressful experience. Pt is avoiding memories and external reminders. Pt has strong physical and emotional reactions to these exposures. Pt reports strong negative beliefs about herself. Pt reports self-blame, strong feelings such as fear horror or anger, los of interest in things she used to enjoy, some self-isolation, difficulty experiencing positive emotion, irritability, diffiuclty concentrating, and difficulty sleeping.          History from Medical Record:        Diagnosis Date    Abnormal Pap smear of cervix     ADHD (attention deficit hyperactivity disorder)     Anxiety     Depression     Fibromyalgia     Hyperlipidemia     Obesity     Unspecified sleep apnea     UTI (urinary tract infection)      Medications:   Current Outpatient Medications   Medication Sig Dispense Refill    pantoprazole (PROTONIX) 40 MG tablet take 1 tablet by mouth once daily 90 tablet 0    sertraline (ZOLOFT) 100 MG tablet take 1 AND 1/2 TABLETS by mouth daily 45 tablet 5    cyanocobalamin 1000 MCG/ML injection inject 1 milliliter ( 1000 MCG ) intramuscularly EVERY 30 DAYS 1 mL 11    busPIRone (BUSPAR) 7.5 MG tablet take 1 tablet by mouth twice a day if needed for anxiety 60 tablet 0    hydrOXYzine (ATARAX) 10 MG tablet Take 1 tablet by mouth 3 times daily as needed for Anxiety 90 tablet 2    traZODone (DESYREL) 50 MG tablet Take 0.5 tablets by mouth nightly as needed for Sleep 15 tablet 2    topiramate (TOPAMAX) 100 MG tablet Take 1 tablet by mouth 2 times daily 60 tablet 3    diclofenac sodium (VOLTAREN) 1 % GEL apply 4 grams topically four times a day AS NEEDED FOR PAIN 500 g 5    amphetamine-dextroamphetamine (ADDERALL) 15 MG tablet take 1 tablet by mouth twice a day 60 tablet 0    levETIRAcetam (KEPPRA) 750 MG tablet take 1 tablet by mouth twice a day      fluticasone (FLONASE) 50 MCG/ACT nasal spray 1 spray by Nasal route daily 16 g 5    Pantoprazole Sodium (PROTONIX PO) Take by mouth      loratadine (CLARITIN) 10 MG tablet take 1 tablet by mouth once daily if needed 30 tablet 5    Mirabegron ER (MYRBETRIQ) 25 MG TB24 Take 1 tablet by mouth daily 14 tablet 0    IRON PO       Vitamin D (CHOLECALCIFEROL) 1000 UNITS CAPS capsule Take 1,000 Units by mouth daily. CALCIUM CITRATE PO Take 1,500 mg by mouth daily. acetaminophen (TYLENOL) 325 MG tablet Take 650 mg by mouth every 6 hours as needed. No current facility-administered medications for this encounter.        Social History:   Social History     Socioeconomic History    Marital status:      Spouse name: Not on file    Number of children: Not on file    Years of education: Not on file    Highest education level: Not on file   Occupational History    Not on file   Tobacco Use    Smoking status: Former     Packs/day: 0.00     Years: 1.00     Pack years: 0.00     Types: Cigarettes     Quit date: 1994     Years since quittin.4    Smokeless tobacco: Never   Substance and Sexual Activity    Alcohol use: No    Drug use: No    Sexual activity: Not on file   Other Topics Concern    Not on file   Social History Narrative    Not on file     Social Determinants of Health     Financial Resource Strain: Low Risk     Difficulty of Paying Living Expenses: Not hard at all   Food Insecurity: No Food Insecurity    Worried About Running Out of Food in the Last Year: Never true    Ran Out of Food in the Last Year: Never true   Transportation Needs: Not on file   Physical Activity: Not on file   Stress: Not on file   Social Connections: Not on file   Intimate Partner Violence: Not on file   Housing Stability: Not on file       TOBACCO:   reports that she quit smoking about 28 years ago. She has never used smokeless tobacco.  ETOH:   reports no history of alcohol use.    Family History:   Family History   Problem Relation Age of Onset    Diabetes Mother     High Blood Pressure Mother     Heart Disease Mother     Heart Disease Father     Cancer Father         prostate    Cancer Paternal Grandmother         breast cancer at age 90           Pt interventions:  Provided education on PTSD symptoms and treatment options for evidence-based treatment (Cognitive Processing Therapy and Prolonged Exposure), Established rapport, Supportive techniques, Cognitive strategies to target negative thoughts including reframing, and Identified maladaptive thoughts, EMDR        PLAN:   Continue EMDR      Patient scheduled to return on:   Thursday 1/26/23 at 515 PM    Were changes or additions made to the treatment plan today?  YES []   NO []  Noted changes:

## 2023-01-26 ENCOUNTER — HOSPITAL ENCOUNTER (OUTPATIENT)
Dept: PSYCHIATRY | Age: 53
Setting detail: THERAPIES SERIES
Discharge: HOME | End: 2023-01-26

## 2023-01-27 NOTE — PROGRESS NOTES
[facility] Therapy Note in Person  Ronald Lewis, [address]   1/26/2023  5:10 PM  Rayshawn Cook  1970  [id number]    Time spent with Patient: 55 minutes      [facility] Discussed with patient model of service to include the limits of confidentiality (i.e. abuse reporting, suicide intervention, etc.) and short-term intervention focused approach. Pt indicated understanding. S:  Pt and therapist engaged in check in. Therapist offered pt emotional support and used active listening to help pt process thoughts and feelings. 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 \"  Therapist and pt also used cognitive techniques to process situations with daughter and questions she gets as to why she was not at the wedding. Therapist led pt in identifying distorted thinking and reframing it. Therapist gave pt space to say what she needed to and express emotions. Therapist led pt in experiential activity using dissolving paper to help pt express the things she feels she cannot say and move forward. Therapist encouraged pt to use a mantra. Pt also expressed an interest in journaling.      O:  MSE:     Appearance    alert, cooperative, mild distress  Appetite normal  Sleep disturbance No  Fatigue Yes  Loss of pleasure Yes  Impulsive behavior No  Speech    normal rate, normal volume, and well articulated  Mood    Demoralization  Affect    Normal  Thought Content    intact  Thought Process    linear, goal directed, and coherent  Associations    logical connections  Insight    Good  Judgment    Intact  Orientation    oriented to person, place, time, and general circumstances  Memory    recent and remote memory intact  Attention/Concentration    intact  Morbid ideation No  Suicide Assessment    no suicidal ideation    A:  Pt presented as demoralized but with normal affect. Pt was able to identify what has been bothering her and processed it. Pt was also able to identify that her daughter started to pull away when her son was born (before she started drinking). Pt also identified why she used to drink and that the \"sad\" was still there. Pt made connections to themes of importance and feeling like she isn't important. Pt was able to reframe thoughts and participated in disappearing paper activity to help get out thoughts and feelings. Pt expressed interest in a guided journal, which therapist will offer recommendations next session. EMDR was not done today as pt has an important test tomorrow and needed to discuss current stressors and problem solve. Visit Diagnosis:   Post Traumatic Stress Disorder -Pt is reporting intrusive and arousal symptoms. Pt is experiencing repeated dreams, memories, and flashbacks of the stressful experience. Pt is avoiding memories and external reminders. Pt has strong physical and emotional reactions to these exposures. Pt reports strong negative beliefs about herself. Pt reports self-blame, strong feelings such as fear horror or anger, los of interest in things she used to enjoy, some self-isolation, difficulty experiencing positive emotion, irritability, diffiuclty concentrating, and difficulty sleeping.       History from Medical Record:        Diagnosis Date    Abnormal Pap smear of cervix     ADHD (attention deficit hyperactivity disorder)     Anxiety     Depression     Fibromyalgia     Hyperlipidemia     Obesity     Unspecified sleep apnea     UTI (urinary tract infection)      Medications:   Current Outpatient Medications   Medication Sig Dispense Refill    pantoprazole (PROTONIX) 40 MG tablet take 1 tablet by mouth once daily 90 tablet 0    sertraline (ZOLOFT) 100 MG tablet take 1 AND 1/2 TABLETS by mouth daily 45 tablet 5    cyanocobalamin 1000 MCG/ML injection inject 1 milliliter ( 1000 MCG ) intramuscularly EVERY 30 DAYS 1 mL 11    busPIRone (BUSPAR) 7.5 MG tablet take 1 tablet by mouth twice a day if needed for anxiety 60 tablet 0    hydrOXYzine (ATARAX) 10 MG tablet Take 1 tablet by mouth 3 times daily as needed for Anxiety 90 tablet 2    traZODone (DESYREL) 50 MG tablet Take 0.5 tablets by mouth nightly as needed for Sleep 15 tablet 2    topiramate (TOPAMAX) 100 MG tablet Take 1 tablet by mouth 2 times daily 60 tablet 3    diclofenac sodium (VOLTAREN) 1 % GEL apply 4 grams topically four times a day AS NEEDED FOR PAIN 500 g 5    amphetamine-dextroamphetamine (ADDERALL) 15 MG tablet take 1 tablet by mouth twice a day 60 tablet 0    levETIRAcetam (KEPPRA) 750 MG tablet take 1 tablet by mouth twice a day      fluticasone (FLONASE) 50 MCG/ACT nasal spray 1 spray by Nasal route daily 16 g 5    Pantoprazole Sodium (PROTONIX PO) Take by mouth      loratadine (CLARITIN) 10 MG tablet take 1 tablet by mouth once daily if needed 30 tablet 5    Mirabegron ER (MYRBETRIQ) 25 MG TB24 Take 1 tablet by mouth daily 14 tablet 0    IRON PO       Vitamin D (CHOLECALCIFEROL) 1000 UNITS CAPS capsule Take 1,000 Units by mouth daily. CALCIUM CITRATE PO Take 1,500 mg by mouth daily. acetaminophen (TYLENOL) 325 MG tablet Take 650 mg by mouth every 6 hours as needed. No current facility-administered medications for this encounter.        Social History:   Social History     Socioeconomic History    Marital status:      Spouse name: Not on file    Number of children: Not on file    Years of education: Not on file    Highest education level: Not on file   Occupational History    Not on file   Tobacco Use    Smoking status: Former     Packs/day: 0.00     Years: 1.00     Pack years: 0.00     Types: Cigarettes     Quit date: 1994     Years since quittin.4    Smokeless tobacco: Never   Substance and Sexual Activity    Alcohol use: No    Drug use: No    Sexual activity: Not on file   Other Topics Concern    Not on file   Social History Narrative    Not on file     Social Determinants of Health     Financial Resource Strain: Not on file   Food Insecurity: Not on file   Transportation Needs: Not on file   Physical Activity: Not on file   Stress: Not on file   Social Connections: Not on file   Intimate Partner Violence: Not on file   Housing Stability: Not on file       TOBACCO:   reports that she quit smoking about 28 years ago. She has never used smokeless tobacco.  ETOH:   reports no history of alcohol use. Family History:   Family History   Problem Relation Age of Onset    Diabetes Mother     High Blood Pressure Mother     Heart Disease Mother     Heart Disease Father     Cancer Father         prostate    Cancer Paternal Grandmother         breast cancer at age 80           Pt interventions:  Trained in improving communication skills, Provided education, Provided education on PTSD symptoms and treatment options for evidence-based treatment (Cognitive Processing Therapy and Prolonged Exposure), Discussed potential barriers to change, Established rapport, Conducted functional assessment, Supportive techniques, Emphasized self-care as important for managing overall health, CBT to target negative thinking, Cognitive strategies to target distortions including feelings as facts, and Identified maladaptive thoughts        PLAN:   Give journal recommendations   EMDR      Patient scheduled to return on:   Thursday 2/2/2023 at 26PM    Were changes or additions made to the treatment plan today?   YES []   NO [x]  Noted changes:

## 2023-02-03 ENCOUNTER — HOSPITAL ENCOUNTER (OUTPATIENT)
Dept: PSYCHIATRY | Age: 53
Setting detail: THERAPIES SERIES
Discharge: HOME | End: 2023-02-03

## 2023-02-03 ENCOUNTER — HOSPITAL ENCOUNTER (OUTPATIENT)
Age: 53
Discharge: HOME | End: 2023-02-03
Payer: COMMERCIAL

## 2023-02-03 DIAGNOSIS — L65.9 HAIR LOSS: ICD-10-CM

## 2023-02-03 DIAGNOSIS — E53.8 VITAMIN B 12 DEFICIENCY: ICD-10-CM

## 2023-02-03 DIAGNOSIS — E78.00 PURE HYPERCHOLESTEROLEMIA: ICD-10-CM

## 2023-02-03 DIAGNOSIS — D50.9 IRON DEFICIENCY ANEMIA, UNSPECIFIED IRON DEFICIENCY ANEMIA TYPE: ICD-10-CM

## 2023-02-03 LAB
CHOLEST SERPL-MCNC: 208 MG/DL
CHOLESTEROL/HDL RATIO: 3.3
HDLC SERPL-MCNC: 63 MG/DL
LDLC SERPL CALC-MCNC: 124 MG/DL (ref 0–130)
TRIGL SERPL-MCNC: 107 MG/DL

## 2023-02-03 PROCEDURE — 83921 ORGANIC ACID SINGLE QUANT: CPT

## 2023-02-03 PROCEDURE — 80061 LIPID PANEL: CPT

## 2023-02-03 PROCEDURE — 36415 COLL VENOUS BLD VENIPUNCTURE: CPT

## 2023-02-03 NOTE — PROGRESS NOTES
[facility] Therapy Note in [place], [place]   2/3/2023  7:50 AM  Rajesh Pizano  1970  [id number]    Time spent with Patient: 60 minutes      [facility]. Discussed with patient model of service to include the limits of confidentiality (i.e. abuse reporting, suicide intervention, etc.) and short-term intervention focused approach. Pt indicated understanding. S:  Pt and therapist engaged in check in and processed pt's week including her test.  Therapist and pt reviwed target plan and selected memory to target that was associated with feeling unimportant/not mattering. Therapist and pt engaged in emdr.    EMDR Treatment     Negative Belief: I don't matter  Positive Belief:  I matter   Target:  leaving her in a car   Past  Initial VOC: 1  Emotions: sad  Initial JAYA: 10  Body Location: chest     Outcome:  Completed  Ending JAYA: 0  Ending VOC: 7  Clear Body Scan? Yes   Closure: Resourcing   Client Stability: good     Other Notes:  Pt gave lots of chances to her mom  Pt feels like she has been hurting as daughter has been pulling away for years        O:  MSE:     Appearance    alert, cooperative, crying  Appetite normal  Sleep disturbance Yes  Fatigue Yes  Loss of pleasure No  Impulsive behavior No  Speech    normal rate, normal volume, and well articulated  Mood    Demoralization  Affect    normal affect  Thought Content    worthlessness, cognitive distortions, and all or nothing thinking  Thought Process    linear, goal directed, and coherent  Associations    logical connections  Insight    Good  Judgment    Intact  Orientation    oriented to person, place, time, and general circumstances  Memory    recent and remote memory intact  Attention/Concentration    intact  Morbid ideation No  Suicide Assessment    no suicidal ideation    A:  Pt presented with demoralized mood.    Pt showed good insight in being able to identify what she felt like she was able to target. Pt was able to bring her JAYA down to 0. Pt expressed a lot of emotion and was very tearful but experienced a cathartic reaction. Pt's JAYA was decreased and displayed a need to process her relationship with her daughter more, going back to when ehr daughter was younger. Visit Diagnosis:   Post Traumatic Stress Disorder -Pt is reporting intrusive and arousal symptoms. Pt is experiencing repeated dreams, memories, and flashbacks of the stressful experience. Pt is avoiding memories and external reminders. Pt has strong physical and emotional reactions to these exposures. Pt reports strong negative beliefs about herself. Pt reports self-blame, strong feelings such as fear horror or anger, los of interest in things she used to enjoy, some self-isolation, difficulty experiencing positive emotion, irritability, diffiuclty concentrating, and difficulty sleeping.       History from Medical Record:        Diagnosis Date    Abnormal Pap smear of cervix     ADHD (attention deficit hyperactivity disorder)     Anxiety     Depression     Fibromyalgia     Hyperlipidemia     Obesity     Unspecified sleep apnea     UTI (urinary tract infection)      Medications:   Current Outpatient Medications   Medication Sig Dispense Refill    pantoprazole (PROTONIX) 40 MG tablet take 1 tablet by mouth once daily 90 tablet 0    sertraline (ZOLOFT) 100 MG tablet take 1 AND 1/2 TABLETS by mouth daily 45 tablet 5    cyanocobalamin 1000 MCG/ML injection inject 1 milliliter ( 1000 MCG ) intramuscularly EVERY 30 DAYS 1 mL 11    busPIRone (BUSPAR) 7.5 MG tablet take 1 tablet by mouth twice a day if needed for anxiety 60 tablet 0    hydrOXYzine (ATARAX) 10 MG tablet Take 1 tablet by mouth 3 times daily as needed for Anxiety 90 tablet 2    traZODone (DESYREL) 50 MG tablet Take 0.5 tablets by mouth nightly as needed for Sleep 15 tablet 2    topiramate (TOPAMAX) 100 MG tablet Take 1 tablet by mouth 2 times daily 60 tablet 3    diclofenac sodium (VOLTAREN) 1 % GEL apply 4 grams topically four times a day AS NEEDED FOR PAIN 500 g 5    amphetamine-dextroamphetamine (ADDERALL) 15 MG tablet take 1 tablet by mouth twice a day 60 tablet 0    levETIRAcetam (KEPPRA) 750 MG tablet take 1 tablet by mouth twice a day      fluticasone (FLONASE) 50 MCG/ACT nasal spray 1 spray by Nasal route daily 16 g 5    Pantoprazole Sodium (PROTONIX PO) Take by mouth      loratadine (CLARITIN) 10 MG tablet take 1 tablet by mouth once daily if needed 30 tablet 5    Mirabegron ER (MYRBETRIQ) 25 MG TB24 Take 1 tablet by mouth daily 14 tablet 0    IRON PO       Vitamin D (CHOLECALCIFEROL) 1000 UNITS CAPS capsule Take 1,000 Units by mouth daily.      CALCIUM CITRATE PO Take 1,500 mg by mouth daily.      acetaminophen (TYLENOL) 325 MG tablet Take 650 mg by mouth every 6 hours as needed.         No current facility-administered medications for this encounter.       Social History:   Social History     Socioeconomic History    Marital status:      Spouse name: Not on file    Number of children: Not on file    Years of education: Not on file    Highest education level: Not on file   Occupational History    Not on file   Tobacco Use    Smoking status: Former     Packs/day: 0.00     Years: 1.00     Pack years: 0.00     Types: Cigarettes     Quit date: 1994     Years since quittin.4    Smokeless tobacco: Never   Substance and Sexual Activity    Alcohol use: No    Drug use: No    Sexual activity: Not on file   Other Topics Concern    Not on file   Social History Narrative    Not on file     Social Determinants of Health     Financial Resource Strain: Low Risk     Difficulty of Paying Living Expenses: Not hard at all   Food Insecurity: No Food Insecurity    Worried About Running Out of Food in the Last Year: Never true    Ran Out of Food in the Last Year: Never true   Transportation Needs: Not on file   Physical Activity: Not on  file   Stress: Not on file   Social Connections: Not on file   Intimate Partner Violence: Not on file   Housing Stability: Not on file       TOBACCO:   reports that she quit smoking about 28 years ago. Her smoking use included cigarettes. She has never used smokeless tobacco.  ETOH:   reports no history of alcohol use. Family History:   Family History   Problem Relation Age of Onset    Diabetes Mother     High Blood Pressure Mother     Heart Disease Mother     Heart Disease Father     Cancer Father         prostate    Cancer Paternal Grandmother         breast cancer at age 80           Pt interventions:  Provided education, Established rapport, Conducted functional assessment, Supportive techniques, and Identified maladaptive thoughts, EMDR        PLAN:   Process nishi more with EMDR      Patient scheduled to return on:   Thursday 2/9/2023 at 5 PM     Were changes or additions made to the treatment plan today?   YES []   NO []  Noted changes:

## 2023-02-05 LAB — METHYLMALONIC ACID: 0.45 UMOL/L (ref 0–0.4)

## 2023-02-09 ENCOUNTER — HOSPITAL ENCOUNTER (OUTPATIENT)
Dept: PSYCHIATRY | Age: 53
Setting detail: THERAPIES SERIES
Discharge: HOME | End: 2023-02-09

## 2023-02-10 NOTE — PROGRESS NOTES
[facility] Therapy Note in Person  [address]   2/9/2023  5:15 PM  Kristen Coxo  1970  [id number]    Time spent with Patient: 60 minutes      [facility] Discussed with patient model of service to include the limits of confidentiality (i.e. abuse reporting, suicide intervention, etc.) and short-term intervention focused approach. Pt indicated understanding. S:  Therapist and pt engaged in check in and processed the death of pt's dog. Pt expressed how she feels like she can move forward from what happened in her past but feels a lot of shame that she has collected a lot of \"stuff\" over the years. Pt and therapist processed this through emdr.    EMDR Treatment     Negative Belief:  there's something wrong with me   Positive Belief:  I'm good where I'm at   Target: Having a lot of stuff   Present  Initial VOC: 1  Emotions: embarrassed, sad   Initial JAYA: 10  Body Location: Shoulders     Outcome:  Completed  Ending JAYA: 0  Ending VOC: 7  Clear Body Scan?  Yes  Closure: resources  Client Stability: Good     Other Notes:  -Pt identified she felt she had to \"bury herself\" to feel safe  -Pt can take it one day a a time  -Less shame          O:  MSE:     Appearance    alert, cooperative, crying  Appetite normal  Sleep disturbance No  Fatigue Yes  Loss of pleasure No  Impulsive behavior No  Speech    normal volume, well articulated, and pressured  Mood    Demoralization  Affect    depressed affect  Thought Content    excessive guilt and cognitive distortions  Thought Process    tangential  Associations    tangential connections  Insight    Good  Judgment    Intact  Orientation    oriented to person, place, time, and general circumstances  Memory    recent and remote memory intact  Attention/Concentration    intact  Morbid ideation No  Suicide Assessment    no suicidal ideation    A:  Pt presented in a demoralized mood with depressed affect. Pt's dog had just  the day prior. Pt was tearful throughout session and displayed an emotional release during EMDR processing. Pt's speech was pressured and tangential at beginning of session which allowed pt to fully engage in EMDR processing. Pt displayed good insight into what she needed to target. Pt decreased JAYA to 0 and increased VOC to 7, indicating a completion of the target. Pt should re-evaluate next week to determine if shame has gone away. Visit Diagnosis:     Post Traumatic Stress Disorder -Pt is reporting intrusive and arousal symptoms. Pt is experiencing repeated dreams, memories, and flashbacks of the stressful experience. Pt is avoiding memories and external reminders. Pt has strong physical and emotional reactions to these exposures. Pt reports strong negative beliefs about herself. Pt reports self-blame, strong feelings such as fear horror or anger, los of interest in things she used to enjoy, some self-isolation, difficulty experiencing positive emotion, irritability, diffiuclty concentrating, and difficulty sleeping.        History from Medical Record:        Diagnosis Date    Abnormal Pap smear of cervix     ADHD (attention deficit hyperactivity disorder)     Anxiety     Depression     Fibromyalgia     Hyperlipidemia     Obesity     Unspecified sleep apnea     UTI (urinary tract infection)      Medications:   Current Outpatient Medications   Medication Sig Dispense Refill    pantoprazole (PROTONIX) 40 MG tablet take 1 tablet by mouth once daily 90 tablet 0    sertraline (ZOLOFT) 100 MG tablet take 1 AND 1/2 TABLETS by mouth daily 45 tablet 5    cyanocobalamin 1000 MCG/ML injection inject 1 milliliter ( 1000 MCG ) intramuscularly EVERY 30 DAYS 1 mL 11    busPIRone (BUSPAR) 7.5 MG tablet take 1 tablet by mouth twice a day if needed for anxiety 60 tablet 0    hydrOXYzine (ATARAX) 10 MG tablet Take 1 tablet by mouth 3 times daily as needed for Anxiety 90 tablet 2 traZODone (DESYREL) 50 MG tablet Take 0.5 tablets by mouth nightly as needed for Sleep 15 tablet 2    topiramate (TOPAMAX) 100 MG tablet Take 1 tablet by mouth 2 times daily 60 tablet 3    diclofenac sodium (VOLTAREN) 1 % GEL apply 4 grams topically four times a day AS NEEDED FOR PAIN 500 g 5    amphetamine-dextroamphetamine (ADDERALL) 15 MG tablet take 1 tablet by mouth twice a day 60 tablet 0    levETIRAcetam (KEPPRA) 750 MG tablet take 1 tablet by mouth twice a day      fluticasone (FLONASE) 50 MCG/ACT nasal spray 1 spray by Nasal route daily 16 g 5    Pantoprazole Sodium (PROTONIX PO) Take by mouth      loratadine (CLARITIN) 10 MG tablet take 1 tablet by mouth once daily if needed 30 tablet 5    Mirabegron ER (MYRBETRIQ) 25 MG TB24 Take 1 tablet by mouth daily 14 tablet 0    IRON PO       Vitamin D (CHOLECALCIFEROL) 1000 UNITS CAPS capsule Take 1,000 Units by mouth daily. CALCIUM CITRATE PO Take 1,500 mg by mouth daily. acetaminophen (TYLENOL) 325 MG tablet Take 650 mg by mouth every 6 hours as needed. No current facility-administered medications for this encounter.        Social History:   Social History     Socioeconomic History    Marital status:      Spouse name: Not on file    Number of children: Not on file    Years of education: Not on file    Highest education level: Not on file   Occupational History    Not on file   Tobacco Use    Smoking status: Former     Packs/day: 0.00     Years: 1.00     Pack years: 0.00     Types: Cigarettes     Quit date: 1994     Years since quittin.4    Smokeless tobacco: Never   Substance and Sexual Activity    Alcohol use: No    Drug use: No    Sexual activity: Not on file   Other Topics Concern    Not on file   Social History Narrative    Not on file     Social Determinants of Health     Financial Resource Strain: Low Risk     Difficulty of Paying Living Expenses: Not hard at all   Food Insecurity: No Food Insecurity    Worried About Running Out of Food in the Last Year: Never true    Ran Out of Food in the Last Year: Never true   Transportation Needs: Not on file   Physical Activity: Not on file   Stress: Not on file   Social Connections: Not on file   Intimate Partner Violence: Not on file   Housing Stability: Not on file       TOBACCO:   reports that she quit smoking about 28 years ago. Her smoking use included cigarettes. She has never used smokeless tobacco.  ETOH:   reports no history of alcohol use. Family History:   Family History   Problem Relation Age of Onset    Diabetes Mother     High Blood Pressure Mother     Heart Disease Mother     Heart Disease Father     Cancer Father         prostate    Cancer Paternal Grandmother         breast cancer at age 80           Pt interventions:  Provided education on PTSD symptoms and treatment options for evidence-based treatment (Cognitive Processing Therapy and Prolonged Exposure), Established rapport, Conducted functional assessment, Supportive techniques, Cognitive strategies to target negative thoughts including theres something wrong with me, and Identified maladaptive thoughts, EMDR        PLAN:   Explore what \"burying self\" means   Re-evaluate target       Patient scheduled to return on:   Thursday 2/16/2023 at 5 PM    Were changes or additions made to the treatment plan today?   YES []   NO [x]  Noted changes:

## 2023-02-16 ENCOUNTER — HOSPITAL ENCOUNTER (OUTPATIENT)
Dept: PSYCHIATRY | Age: 53
Setting detail: THERAPIES SERIES
Discharge: HOME | End: 2023-02-16

## 2023-02-17 NOTE — PROGRESS NOTES
[facility] Therapy Note in Person  Mateo Colunga, [address]   2/16/2023  5:15 PM  Oliver Finger  1970  [id number]    Time spent with Patient: 60 minutes      Pt was provided informed consent for the [address] [place] [address]. Discussed with patient model of service to include the limits of confidentiality (i.e. abuse reporting, suicide intervention, etc.) and short-term intervention focused approach. Pt indicated understanding. S:  Pt and therapist engaged in check in to process pt's week. Therapist and pt used cognitive skills to process difficult thoughts and feelings including difficulties with her  and pt's difficulty trusting anyone. Pt and therapist engaged in EMDR. EMDR Treatment     Negative Belief:  I'm the least important  Positive Belief:  I am of value  Target: M and Gm telling pt to be seen and not heard  Past  Initial VOC: 2  Emotions: Sad  Initial JAYA: 8  Body Location: Chest     Outcome:  Unfinished  Ending JAYA: 0  Ending VOC: 6  Clear Body Scan? Yes   Closure: resources   Client Stability: Good     Other Notes:  -Pt would benefit from exploring social skills to help her in social situations   -Pt does not want to worry about what others thinks of her          O:  MSE:     Appearance    alert, cooperative, crying, mild distress  Appetite normal  Sleep disturbance Yes  Fatigue Yes  Loss of pleasure Yes  Impulsive behavior No  Speech    normal volume, rapid, and pressured  Mood    Anxious  Affect    anxiety  Thought Content    worthlessness and cognitive distortions  Thought Process    tangential  Associations    tangential connections  Insight    good  Judgment    Intact  Orientation    oriented to person, place, time, and general circumstances  Memory    recent and remote memory intact  Attention/Concentration    impaired  Morbid ideation No  Suicide Assessment    no suicidal ideation    A:  Pt presented to session as anxious.   Pt's speech was rapid and pressured, and her associations and thought process were tangential.  Pt typically speaks quickly and goes on tangents but was able to redirect herself this time. Per pt report therapist assesses that pt struggles with interpersonal relationships and boundaries. Therapist will assess further for borderline personality disorder. Pt was able to reduce her JAYA to 0 and her VOC up to 6. Pt struggles with self-worth and trusting others. Pt would benefit from continued trauma-focused therapy to address negative beliefs she has about herself. Visit Diagnosis:   Post Traumatic Stress Disorder -Pt is reporting intrusive and arousal symptoms. Pt is experiencing repeated dreams, memories, and flashbacks of the stressful experience. Pt is avoiding memories and external reminders. Pt has strong physical and emotional reactions to these exposures. Pt reports strong negative beliefs about herself. Pt reports self-blame, strong feelings such as fear horror or anger, los of interest in things she used to enjoy, some self-isolation, difficulty experiencing positive emotion, irritability, diffiuclty concentrating, and difficulty sleeping.     R/O Borderline personality disorder       History from Medical Record:        Diagnosis Date    Abnormal Pap smear of cervix     ADHD (attention deficit hyperactivity disorder)     Anxiety     Depression     Fibromyalgia     Hyperlipidemia     Obesity     Unspecified sleep apnea     UTI (urinary tract infection)      Medications:   Current Outpatient Medications   Medication Sig Dispense Refill    pantoprazole (PROTONIX) 40 MG tablet take 1 tablet by mouth once daily 90 tablet 0    sertraline (ZOLOFT) 100 MG tablet take 1 AND 1/2 TABLETS by mouth daily 45 tablet 5    cyanocobalamin 1000 MCG/ML injection inject 1 milliliter ( 1000 MCG ) intramuscularly EVERY 30 DAYS 1 mL 11    busPIRone (BUSPAR) 7.5 MG tablet take 1 tablet by mouth twice a day if needed for anxiety 60 tablet 0    hydrOXYzine (ATARAX) 10 MG tablet Take 1 tablet by mouth 3 times daily as needed for Anxiety 90 tablet 2    traZODone (DESYREL) 50 MG tablet Take 0.5 tablets by mouth nightly as needed for Sleep 15 tablet 2    topiramate (TOPAMAX) 100 MG tablet Take 1 tablet by mouth 2 times daily 60 tablet 3    diclofenac sodium (VOLTAREN) 1 % GEL apply 4 grams topically four times a day AS NEEDED FOR PAIN 500 g 5    amphetamine-dextroamphetamine (ADDERALL) 15 MG tablet take 1 tablet by mouth twice a day 60 tablet 0    levETIRAcetam (KEPPRA) 750 MG tablet take 1 tablet by mouth twice a day      fluticasone (FLONASE) 50 MCG/ACT nasal spray 1 spray by Nasal route daily 16 g 5    Pantoprazole Sodium (PROTONIX PO) Take by mouth      loratadine (CLARITIN) 10 MG tablet take 1 tablet by mouth once daily if needed 30 tablet 5    Mirabegron ER (MYRBETRIQ) 25 MG TB24 Take 1 tablet by mouth daily 14 tablet 0    IRON PO       Vitamin D (CHOLECALCIFEROL) 1000 UNITS CAPS capsule Take 1,000 Units by mouth daily. CALCIUM CITRATE PO Take 1,500 mg by mouth daily. acetaminophen (TYLENOL) 325 MG tablet Take 650 mg by mouth every 6 hours as needed. No current facility-administered medications for this encounter.        Social History:   Social History     Socioeconomic History    Marital status:      Spouse name: Not on file    Number of children: Not on file    Years of education: Not on file    Highest education level: Not on file   Occupational History    Not on file   Tobacco Use    Smoking status: Former     Packs/day: 0.00     Years: 1.00     Pack years: 0.00     Types: Cigarettes     Quit date: 1994     Years since quittin.4    Smokeless tobacco: Never   Substance and Sexual Activity    Alcohol use: No    Drug use: No    Sexual activity: Not on file   Other Topics Concern    Not on file   Social History Narrative    Not on file     Social Determinants of Health     Financial Resource Strain: Low Risk     Difficulty of Paying Living Expenses: Not hard at all   Food Insecurity: No Food Insecurity    Worried About Running Out of Food in the Last Year: Never true    Ran Out of Food in the Last Year: Never true   Transportation Needs: Not on file   Physical Activity: Not on file   Stress: Not on file   Social Connections: Not on file   Intimate Partner Violence: Not on file   Housing Stability: Not on file       TOBACCO:   reports that she quit smoking about 28 years ago. Her smoking use included cigarettes. She has never used smokeless tobacco.  ETOH:   reports no history of alcohol use. Family History:   Family History   Problem Relation Age of Onset    Diabetes Mother     High Blood Pressure Mother     Heart Disease Mother     Heart Disease Father     Cancer Father         prostate    Cancer Paternal Grandmother         breast cancer at age 80           Pt interventions:  Provided education on PTSD symptoms and treatment options for evidence-based treatment (Cognitive Processing Therapy and Prolonged Exposure), Discussed potential barriers to change, Established rapport, Conducted functional assessment, Supportive techniques, Cognitive strategies to target distortions including reframing, and Identified maladaptive thoughts, EMDR        PLAN:   Social skills  Re-evaluate target      Patient scheduled to return on:   Thursday 2/23/23 at 5 PM    Were changes or additions made to the treatment plan today?   YES []   NO [x]  Noted changes:

## 2023-02-23 ENCOUNTER — HOSPITAL ENCOUNTER (OUTPATIENT)
Dept: PSYCHIATRY | Age: 53
Setting detail: THERAPIES SERIES
Discharge: HOME | End: 2023-02-23

## 2023-03-09 ENCOUNTER — HOSPITAL ENCOUNTER (OUTPATIENT)
Dept: PSYCHIATRY | Age: 53
Setting detail: THERAPIES SERIES
Discharge: HOME | End: 2023-03-09
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
## 2023-03-13 ENCOUNTER — HOSPITAL ENCOUNTER (OUTPATIENT)
Dept: PSYCHIATRY | Age: 53
Setting detail: THERAPIES SERIES
Discharge: HOME | End: 2023-03-13

## 2023-03-13 NOTE — PROGRESS NOTES
[facility] Therapy Note in [facility] [address], [address]   3/13/2023  3:00 PM  Terell Vergara  1970  [id number]    Time spent with Patient: 60 minutes      Pt was provided informed consent for the [facility]. Discussed with patient model of service to include the limits of confidentiality (i.e. abuse reporting, suicide intervention, etc.) and short-term intervention focused approach. Pt indicated understanding. S:  Therapist and pt reviewed pt's updated tx plan. Pt and therapist signed physical copy. Therapist and pt explored pt's week and processed thoughts and feelings. Therapist and pt discussed EMDR targets. Pt expressed that she does not feel ready to process some situations with her M. Therapist offered psychoeducation on trauma and avoidance as a past coping skill. Therapist and pt engaged in EMDR. EMDR Treatment     Negative Belief: I'm discarded   Positive Belief:  I'm a good person   Target: feeling alone this last week   Present  Initial VOC: 2  Emotions: scared   Initial JAYA: 8  Body Location: Chest     Outcome:  Completed  Ending JAYA: 0  Ending VOC: 7  Clear Body Scan?  Yes   Closure: Resources   Client Stability: Good     Other Notes:  -Pt identified thoughts: something is wrong with me, I'm too wrong or too stupid  -People leave  -Pt was able to say it's okay if people don't like her     O:  MSE:     Appearance    alert, cooperative, mild distress  Appetite normal  Sleep disturbance Yes  Fatigue Yes  Loss of pleasure Yes  Impulsive behavior No  Speech    normal rate, normal volume, and well articulated  Mood    Demoralization  Affect    depressed affect  Thought Content    worthlessness and cognitive distortions  Thought Process    linear, goal directed, and coherent  Associations    logical connections  Insight    Good:   Judgment    Intact  Orientation    oriented to person, place, time, and general circumstances  Memory    recent and remote memory intact  Attention/Concentration    intact  Morbid ideation No  Suicide Assessment    no suicidal ideation    A:  Pt presented in demoralized mood and depressed affect. Pt's speech was at a normal rate and was able to remain focused and on track. Pt displayed good insight into her needs and feelings by identifying that she was not ready to process situations with her M. Pt is willing to make the target: targeting mom. Pt was able to engage in EMDR and reduce distress related to loneliness. Pt also acknowledged that she may need to \"find new people. \"          Visit Diagnosis:   Post Traumatic Stress Disorder -Pt is reporting intrusive and arousal symptoms. Pt is experiencing repeated dreams, memories, and flashbacks of the stressful experience. Pt is avoiding memories and external reminders. Pt has strong physical and emotional reactions to these exposures. Pt reports strong negative beliefs about herself. Pt reports self-blame, strong feelings such as fear horror or anger, los of interest in things she used to enjoy, some self-isolation, difficulty experiencing positive emotion, irritability, diffiuclty concentrating, and difficulty sleeping.      R/O Depression, Borderline Personality Disorder       History from Medical Record:        Diagnosis Date    Abnormal Pap smear of cervix     ADHD (attention deficit hyperactivity disorder)     Anxiety     Depression     Fibromyalgia     Hyperlipidemia     Obesity     Unspecified sleep apnea     UTI (urinary tract infection)      Medications:   Current Outpatient Medications   Medication Sig Dispense Refill    pantoprazole (PROTONIX) 40 MG tablet take 1 tablet by mouth once daily 90 tablet 0    sertraline (ZOLOFT) 100 MG tablet take 1 AND 1/2 TABLETS by mouth daily 45 tablet 5    cyanocobalamin 1000 MCG/ML injection inject 1 milliliter ( 1000 MCG ) intramuscularly EVERY 30 DAYS 1 mL 11    busPIRone (BUSPAR) 7.5 MG tablet take 1 tablet by mouth twice a day if needed for anxiety 60 tablet 0    hydrOXYzine (ATARAX) 10 MG tablet Take 1 tablet by mouth 3 times daily as needed for Anxiety 90 tablet 2    traZODone (DESYREL) 50 MG tablet Take 0.5 tablets by mouth nightly as needed for Sleep 15 tablet 2    topiramate (TOPAMAX) 100 MG tablet Take 1 tablet by mouth 2 times daily 60 tablet 3    diclofenac sodium (VOLTAREN) 1 % GEL apply 4 grams topically four times a day AS NEEDED FOR PAIN 500 g 5    amphetamine-dextroamphetamine (ADDERALL) 15 MG tablet take 1 tablet by mouth twice a day 60 tablet 0    levETIRAcetam (KEPPRA) 750 MG tablet take 1 tablet by mouth twice a day      fluticasone (FLONASE) 50 MCG/ACT nasal spray 1 spray by Nasal route daily 16 g 5    Pantoprazole Sodium (PROTONIX PO) Take by mouth      loratadine (CLARITIN) 10 MG tablet take 1 tablet by mouth once daily if needed 30 tablet 5    Mirabegron ER (MYRBETRIQ) 25 MG TB24 Take 1 tablet by mouth daily 14 tablet 0    IRON PO       Vitamin D (CHOLECALCIFEROL) 1000 UNITS CAPS capsule Take 1,000 Units by mouth daily. CALCIUM CITRATE PO Take 1,500 mg by mouth daily. acetaminophen (TYLENOL) 325 MG tablet Take 650 mg by mouth every 6 hours as needed. No current facility-administered medications for this encounter.        Social History:   Social History     Socioeconomic History    Marital status:      Spouse name: Not on file    Number of children: Not on file    Years of education: Not on file    Highest education level: Not on file   Occupational History    Not on file   Tobacco Use    Smoking status: Former     Packs/day: 0.00     Years: 1.00     Pack years: 0.00     Types: Cigarettes     Quit date: 1994     Years since quittin.5    Smokeless tobacco: Never   Substance and Sexual Activity    Alcohol use: No    Drug use: No    Sexual activity: Not on file   Other Topics Concern    Not on file   Social History Narrative    Not on file     Social Determinants of Health     Financial Resource Strain: Low Risk     Difficulty of Paying Living Expenses: Not hard at all   Food Insecurity: No Food Insecurity    Worried About Running Out of Food in the Last Year: Never true    Ran Out of Food in the Last Year: Never true   Transportation Needs: Not on file   Physical Activity: Not on file   Stress: Not on file   Social Connections: Not on file   Intimate Partner Violence: Not on file   Housing Stability: Not on file       TOBACCO:   reports that she quit smoking about 28 years ago. Her smoking use included cigarettes. She has never used smokeless tobacco.  ETOH:   reports no history of alcohol use. Family History:   Family History   Problem Relation Age of Onset    Diabetes Mother     High Blood Pressure Mother     Heart Disease Mother     Heart Disease Father     Cancer Father         prostate    Cancer Paternal Grandmother         breast cancer at age 80           Pt interventions:  Trained in improving communication skills, Provided education, Provided education on PTSD symptoms and treatment options for evidence-based treatment (Cognitive Processing Therapy and Prolonged Exposure), Discussed potential barriers to change, Established rapport, Conducted functional assessment, [place]-setting to identify pt's primary goals for [facility] visit / overall health, Supportive techniques, and Identified maladaptive thoughts, EMDR        PLAN:   EMDR: target- targeting mom; situation where pt thinks negatively about herself   Discuss meeting new people     Patient scheduled to return on:   Thursday 3/23/23 at 5 PM    Were changes or additions made to the treatment plan today?   YES []   NO [x]  Noted changes:

## 2023-03-23 ENCOUNTER — HOSPITAL ENCOUNTER (OUTPATIENT)
Dept: PSYCHIATRY | Age: 53
Setting detail: THERAPIES SERIES
Discharge: HOME | End: 2023-03-23

## 2023-03-24 NOTE — PROGRESS NOTES
on file   Intimate Partner Violence: Not on file   Housing Stability: Not on file       TOBACCO:   reports that she quit smoking about 28 years ago. Her smoking use included cigarettes. She has never used smokeless tobacco.  ETOH:   reports no history of alcohol use. Family History:   Family History   Problem Relation Age of Onset    Diabetes Mother     High Blood Pressure Mother     Heart Disease Mother     Heart Disease Father     Cancer Father         prostate    Cancer Paternal Grandmother         breast cancer at age 80           Pt interventions:  Provided education, Provided education on PTSD symptoms and treatment options for evidence-based treatment (Cognitive Processing Therapy and Prolonged Exposure), Motivational Interviewing to determine importance and readiness for change, Discussed potential barriers to change, Established rapport, Conducted functional assessment, Supportive techniques, Cognitive strategies to target negative thoughts including reframing, and Identified maladaptive thoughts    PLAN:   EMDR: Target- targeting Mom  BPD assessment       Patient scheduled to return on:   Thursday 3/30/23 at 5:15 PM    Were changes or additions made to the treatment plan today?   YES []   NO [x]  Noted changes:

## 2023-03-30 ENCOUNTER — HOSPITAL ENCOUNTER (OUTPATIENT)
Dept: PSYCHIATRY | Age: 53
Setting detail: THERAPIES SERIES
Discharge: HOME | End: 2023-03-30

## 2023-03-31 NOTE — PROGRESS NOTES
[facility] Therapy Note in Person  FRANCA Harvey   3/30/23  5:15 PM  Jerome Crockett  1970  [id number]    Time spent with Patient: 60 minutes      Pt was [facility]. Discussed with patient model of service to include the limits of confidentiality (i.e. abuse reporting, suicide intervention, etc.) and short-term intervention focused approach. Pt indicated understanding. S:  Therapist and pt engaged in check in. Therapist gave pt first 15 minutes of session to ventilate frustrations and recent week. Therapist and pt then reviewed pt's EMDR tracking sheet and pt identified which targets have been completed. Therapist and pt then engaged in EMDR. EMDR Treatment     Negative Belief: I can't do this   Positive Belief: I can do this   Target: Targeting M in EMDR   Present  Initial VOC: 2  Emotions: Stressed, nervous   Initial JAYA: 10  Body Location: stomach     Outcome:  Completed  Ending JAYA: 0  Ending VOC: 7  Clear Body Scan?  Yes   Closure: Resources   Client Stability: Good     Other Notes:  Pt feels like something is \"buried\" and is afraid of what could come up  Pt felt more relaxed after learning that she did not have to \"go digging\" for memories      O:  MSE:     Appearance    alert, cooperative  Appetite abnormal: eating for comfort   Sleep disturbance Yes  Fatigue Yes  Loss of pleasure Yes  Impulsive behavior No  Speech    normal rate, normal volume, and well articulated  Mood    Depressed  Demoralization  Affect    depressed affect  Thought Content    hopelessness, worthlessness, and cognitive distortions  Thought Process    linear, goal directed, and coherent  Associations    logical connections  Insight    Good  Judgment    Intact  Orientation    oriented to person, place, time, and general circumstances  Memory    recent and remote memory intact  Attention/Concentration    intact  Morbid ideation No  Suicide Assessment    no

## 2023-04-06 ENCOUNTER — HOSPITAL ENCOUNTER (OUTPATIENT)
Dept: PSYCHIATRY | Age: 53
Setting detail: THERAPIES SERIES
Discharge: HOME | End: 2023-04-06

## 2023-04-07 NOTE — PROGRESS NOTES
tablet Take 1 tablet by mouth 2 times daily 60 tablet 3    diclofenac sodium (VOLTAREN) 1 % GEL apply 4 grams topically four times a day AS NEEDED FOR PAIN 500 g 5    amphetamine-dextroamphetamine (ADDERALL) 15 MG tablet take 1 tablet by mouth twice a day 60 tablet 0    levETIRAcetam (KEPPRA) 750 MG tablet take 1 tablet by mouth twice a day      fluticasone (FLONASE) 50 MCG/ACT nasal spray 1 spray by Nasal route daily 16 g 5    Pantoprazole Sodium (PROTONIX PO) Take by mouth      loratadine (CLARITIN) 10 MG tablet take 1 tablet by mouth once daily if needed 30 tablet 5    Mirabegron ER (MYRBETRIQ) 25 MG TB24 Take 1 tablet by mouth daily 14 tablet 0    IRON PO       Vitamin D (CHOLECALCIFEROL) 1000 UNITS CAPS capsule Take 1,000 Units by mouth daily. CALCIUM CITRATE PO Take 1,500 mg by mouth daily. acetaminophen (TYLENOL) 325 MG tablet Take 650 mg by mouth every 6 hours as needed. No current facility-administered medications for this encounter.        Social History:   Social History     Socioeconomic History    Marital status:      Spouse name: Not on file    Number of children: Not on file    Years of education: Not on file    Highest education level: Not on file   Occupational History    Not on file   Tobacco Use    Smoking status: Former     Packs/day: 0.00     Years: 1.00     Pack years: 0.00     Types: Cigarettes     Quit date: 1994     Years since quittin.6    Smokeless tobacco: Never   Substance and Sexual Activity    Alcohol use: No    Drug use: No    Sexual activity: Not on file   Other Topics Concern    Not on file   Social History Narrative    Not on file     Social Determinants of Health     Financial Resource Strain: Low Risk     Difficulty of Paying Living Expenses: Not hard at all   Food Insecurity: No Food Insecurity    Worried About Running Out of Food in the Last Year: Never true    Ran Out of Food in the Last Year: Never true   Transportation Needs: Not on file

## 2023-04-20 ENCOUNTER — HOSPITAL ENCOUNTER (OUTPATIENT)
Dept: PSYCHIATRY | Age: 53
Setting detail: THERAPIES SERIES
Discharge: HOME | End: 2023-04-20

## 2023-04-21 NOTE — PROGRESS NOTES
and remote memory intact  Attention/Concentration    intact  Morbid ideation No  Suicide Assessment    no suicidal ideation    A:  Pt presented today in a normal mood with normal affect. Pt identified times in which she was able to ignore rude comments at work and not engage. Pt's speech was normal today and was able to redirect herself if she went off topic. Pt also displayed good insight by identifying that she no longer needed to process previous target and wanted to explore a new target that was being triggered. Pt processed well and reduced JAYA from 6 to 0 and her VOC increased from 3 to 7. Visit Diagnosis:   Post Traumatic Stress Disorder -Pt is reporting intrusive and arousal symptoms. Pt is experiencing repeated dreams, memories, and flashbacks of the stressful experience. Pt is avoiding memories and external reminders. Pt has strong physical and emotional reactions to these exposures. Pt reports strong negative beliefs about herself. Pt reports self-blame, strong feelings such as fear horror or anger, los of interest in things she used to enjoy, some self-isolation, difficulty experiencing positive emotion, irritability, diffiuclty concentrating, and difficulty sleeping.      R/O Depression, Borderline Personality Disorder       History from Medical Record:        Diagnosis Date    Abnormal Pap smear of cervix     ADHD (attention deficit hyperactivity disorder)     Anxiety     Depression     Fibromyalgia     Hyperlipidemia     Obesity     Unspecified sleep apnea     UTI (urinary tract infection)      Medications:   Current Outpatient Medications   Medication Sig Dispense Refill    pantoprazole (PROTONIX) 40 MG tablet take 1 tablet by mouth once daily 90 tablet 0    sertraline (ZOLOFT) 100 MG tablet take 1 AND 1/2 TABLETS by mouth daily 45 tablet 5    cyanocobalamin 1000 MCG/ML injection inject 1 milliliter ( 1000 MCG ) intramuscularly EVERY 30 DAYS 1 mL 11    busPIRone (BUSPAR) 7.5 MG tablet

## 2023-04-27 ENCOUNTER — HOSPITAL ENCOUNTER (OUTPATIENT)
Dept: PSYCHIATRY | Age: 53
Setting detail: THERAPIES SERIES
Discharge: HOME | End: 2023-04-27

## 2023-04-28 NOTE — PROGRESS NOTES
[facility] {OM patient visit type:34590} Note in Person  [address]   4/27/23  5:15 PM  Leatha Redmond  1970  [id number]    Time spent with Patient: *** minutes      Pt was provided informed consent for the [facility]. Discussed with patient model of service to include the limits of confidentiality (i.e. abuse reporting, suicide intervention, etc.) and short-term intervention focused approach. Pt indicated understanding.       S:          O:  MSE:     Appearance    {GENERAL APPEARANCE:88810}  Appetite {Desc; normal/:59603}  Sleep disturbance {YES / NO:19727}  Fatigue {YES / NO:19727}  Loss of pleasure {YES / OR:40354}  Impulsive behavior {YES / AX:33560}  Speech    {SJOIBM:650049398}  Mood    {[facility] AFFECT/MOOD:9355595361}  Affect    {EXAM; PSYCH TGGWNO:02973}  Thought Content    {EXAM; PSYCH THOUGHT CONTENT FA:98315}  Thought Process    {THOUGHT PROCESS:330071370}  Associations    {Findings; psych thought process exam:90199::\"logical connections\"}  Insight    {Assessment:9497097351}  Judgment    {FINDINGS; OT UE INTACT/IMPAIRED:1459868291}  Orientation    {Exam; orientation:83133}  Memory    {EXAM; NEURO PED JYBRMF:51233}  Attention/Concentration    {Desc; intact/impaired:70367}  Morbid ideation {YES / WL:16910}  Suicide Assessment    {SUICIDE:771740514}    A:          Visit Diagnosis:         History from Medical Record:        Diagnosis Date    Abnormal Pap smear of cervix     ADHD (attention deficit hyperactivity disorder)     Anxiety     Depression     Fibromyalgia     Hyperlipidemia     Obesity     Unspecified sleep apnea     UTI (urinary tract infection)      Medications:   Current Outpatient Medications   Medication Sig Dispense Refill    pantoprazole (PROTONIX) 40 MG tablet take 1 tablet by mouth once daily 90 tablet 0    sertraline (ZOLOFT) 100 MG tablet take 1 AND 1/2 TABLETS by mouth daily 45 tablet 5    cyanocobalamin 1000 MCG/ML injection inject 1 milliliter

## 2023-05-04 ENCOUNTER — HOSPITAL ENCOUNTER (OUTPATIENT)
Dept: PSYCHIATRY | Age: 53
Setting detail: THERAPIES SERIES
Discharge: HOME | End: 2023-05-04

## 2023-05-05 NOTE — PROGRESS NOTES
a day if needed for anxiety 60 tablet 0    hydrOXYzine (ATARAX) 10 MG tablet Take 1 tablet by mouth 3 times daily as needed for Anxiety 90 tablet 2    traZODone (DESYREL) 50 MG tablet Take 0.5 tablets by mouth nightly as needed for Sleep 15 tablet 2    topiramate (TOPAMAX) 100 MG tablet Take 1 tablet by mouth 2 times daily 60 tablet 3    diclofenac sodium (VOLTAREN) 1 % GEL apply 4 grams topically four times a day AS NEEDED FOR PAIN 500 g 5    amphetamine-dextroamphetamine (ADDERALL) 15 MG tablet take 1 tablet by mouth twice a day 60 tablet 0    levETIRAcetam (KEPPRA) 750 MG tablet take 1 tablet by mouth twice a day      fluticasone (FLONASE) 50 MCG/ACT nasal spray 1 spray by Nasal route daily 16 g 5    Pantoprazole Sodium (PROTONIX PO) Take by mouth      loratadine (CLARITIN) 10 MG tablet take 1 tablet by mouth once daily if needed 30 tablet 5    Mirabegron ER (MYRBETRIQ) 25 MG TB24 Take 1 tablet by mouth daily 14 tablet 0    IRON PO       Vitamin D (CHOLECALCIFEROL) 1000 UNITS CAPS capsule Take 1,000 Units by mouth daily. CALCIUM CITRATE PO Take 1,500 mg by mouth daily. acetaminophen (TYLENOL) 325 MG tablet Take 650 mg by mouth every 6 hours as needed. No current facility-administered medications for this encounter.        Social History:   Social History     Socioeconomic History    Marital status:      Spouse name: Not on file    Number of children: Not on file    Years of education: Not on file    Highest education level: Not on file   Occupational History    Not on file   Tobacco Use    Smoking status: Former     Packs/day: 0.00     Years: 1.00     Pack years: 0.00     Types: Cigarettes     Quit date: 1994     Years since quittin.6    Smokeless tobacco: Never   Substance and Sexual Activity    Alcohol use: No    Drug use: No    Sexual activity: Not on file   Other Topics Concern    Not on file   Social History Narrative    Not on file     Social Determinants of Health

## 2023-05-17 ENCOUNTER — HOSPITAL ENCOUNTER (OUTPATIENT)
Dept: PSYCHIATRY | Age: 53
Setting detail: THERAPIES SERIES
Discharge: HOME | End: 2023-05-17
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
## 2023-05-25 ENCOUNTER — HOSPITAL ENCOUNTER (OUTPATIENT)
Dept: PSYCHIATRY | Age: 53
Setting detail: THERAPIES SERIES
Discharge: HOME | End: 2023-05-25
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
## 2023-06-01 ENCOUNTER — HOSPITAL ENCOUNTER (OUTPATIENT)
Dept: PSYCHIATRY | Age: 53
Setting detail: THERAPIES SERIES
Discharge: HOME | End: 2023-06-01
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
## 2023-06-09 ENCOUNTER — HOSPITAL ENCOUNTER (OUTPATIENT)
Dept: PSYCHIATRY | Age: 53
Setting detail: THERAPIES SERIES
Discharge: HOME | End: 2023-06-09

## 2023-06-09 NOTE — PROGRESS NOTES
intact  Morbid ideation No  Suicide Assessment    no suicidal ideation    Pt reports \"not wanting to be sad\" anymore and wonders if things would be better if she \"wasn't around. \"  But pt denies any desire to die or thoughts of harming herself. Pt has no plan or intent to harm self. Pt able to identify a desire to get better. A:  Pt presented as emotionally dysregulated and not stable enough for EMDR today. Pt was able to process her thoughts and emotions. Pt is feeling lonely and \"abandoned\" by those around her including her son. Through discussion pt was able to work on reframing her thoughts to address perceived abandonment and anxiety. Pt reports \"not wanting to be sad\" anymore and wonders if things would be better if she \"wasn't around. \"  But pt denies any desire to die or thoughts of harming herself. Pt has no plan or intent to harm self. Pt able to identify a desire to get better. Pt would benefit from exploring things that bring her christen/happiness and reduce distorted thinking. Pt to try 2 things before next appt that bring her happiness. Visit Diagnosis:     Post Traumatic Stress Disorder -Pt is reporting intrusive and arousal symptoms. Pt is experiencing repeated dreams, memories, and flashbacks of the stressful experience. Pt is avoiding memories and external reminders. Pt has strong physical and emotional reactions to these exposures. Pt reports strong negative beliefs about herself.   Pt reports self-blame, strong feelings such as fear horror or anger, los of interest in things she used to enjoy, some self-isolation, difficulty experiencing positive emotion, irritability, diffiuclty concentrating, and difficulty sleeping    History from Medical Record:        Diagnosis Date    Abnormal Pap smear of cervix     ADHD (attention deficit hyperactivity disorder)     Anxiety     Depression     Fibromyalgia     Hyperlipidemia     Obesity     Unspecified sleep apnea     UTI (urinary tract

## 2023-07-13 ENCOUNTER — FOLLOWUP TELEPHONE ENCOUNTER (OUTPATIENT)
Dept: PSYCHIATRY | Age: 53
End: 2023-07-13

## 2023-07-13 NOTE — PROGRESS NOTES
[facility] Note   NavarroFRANCA Billingsley   7/13/2023  2:23 PM    Reshma Perry  1970  [id number]    Pt reached out to schedule next appt.   Appt scheduled for Friday 7/21/23 at 11 AM

## 2023-07-21 ENCOUNTER — HOSPITAL ENCOUNTER (OUTPATIENT)
Dept: PSYCHIATRY | Age: 53
Setting detail: THERAPIES SERIES
Discharge: HOME | End: 2023-07-21

## 2023-07-28 ENCOUNTER — HOSPITAL ENCOUNTER (OUTPATIENT)
Dept: PSYCHIATRY | Age: 53
Setting detail: THERAPIES SERIES
Discharge: HOME | End: 2023-07-28

## 2023-07-28 NOTE — PROGRESS NOTES
DAYS 1 mL 2    sertraline (ZOLOFT) 100 MG tablet TAKE 1 AND 1/2 TABLETS BY MOUTH DAILY 135 tablet 1    pantoprazole (PROTONIX) 40 MG tablet take 1 tablet by mouth once daily 90 tablet 0    busPIRone (BUSPAR) 7.5 MG tablet take 1 tablet by mouth twice a day if needed for anxiety 60 tablet 0    hydrOXYzine (ATARAX) 10 MG tablet Take 1 tablet by mouth 3 times daily as needed for Anxiety 90 tablet 2    topiramate (TOPAMAX) 100 MG tablet Take 1 tablet by mouth 2 times daily 60 tablet 3    diclofenac sodium (VOLTAREN) 1 % GEL apply 4 grams topically four times a day AS NEEDED FOR PAIN 500 g 5    amphetamine-dextroamphetamine (ADDERALL) 15 MG tablet take 1 tablet by mouth twice a day 60 tablet 0    fluticasone (FLONASE) 50 MCG/ACT nasal spray 1 spray by Nasal route daily 16 g 5    Pantoprazole Sodium (PROTONIX PO) Take by mouth      loratadine (CLARITIN) 10 MG tablet take 1 tablet by mouth once daily if needed 30 tablet 5    Mirabegron ER (MYRBETRIQ) 25 MG TB24 Take 1 tablet by mouth daily 14 tablet 0    IRON PO       Vitamin D (CHOLECALCIFEROL) 1000 UNITS CAPS capsule Take 1 capsule by mouth daily      CALCIUM CITRATE PO Take 1,500 mg by mouth daily. acetaminophen (TYLENOL) 325 MG tablet Take 2 tablets by mouth every 6 hours as needed       No current facility-administered medications for this encounter.        Social History:   Social History     Socioeconomic History    Marital status:      Spouse name: Not on file    Number of children: Not on file    Years of education: Not on file    Highest education level: Not on file   Occupational History    Not on file   Tobacco Use    Smoking status: Former     Packs/day: 0.00     Years: 1.00     Pack years: 0.00     Types: Cigarettes     Quit date: 1994     Years since quittin.9    Smokeless tobacco: Never   Substance and Sexual Activity    Alcohol use: No    Drug use: No    Sexual activity: Not on file   Other Topics Concern    Not on file   Social History

## 2023-08-04 ENCOUNTER — HOSPITAL ENCOUNTER (OUTPATIENT)
Dept: PSYCHIATRY | Age: 53
Setting detail: THERAPIES SERIES
Discharge: HOME | End: 2023-08-04

## 2023-08-04 NOTE — PROGRESS NOTES
[facility] Therapy Note in 214 [facility], [facility]   8/4/2023  10:00 AM  Nolan Correa  1970  [id number]    Time spent with Patient: 60 minutes      Pt was provided informed consent for the [address]. Discussed with patient model of service to include the limits of confidentiality (i.e. abuse reporting, suicide intervention, etc.) and short-term intervention focused approach. Pt indicated understanding. This was not a virtual session      S:  Pt was given time at beginning of session to tell a story or vent. Pt completed updated PCL5, GAD7, and PHQ9 scales. Pt reports a reduction in trauma symptoms and depression but a slight increase in anxiety. Pt also reports she knows she deserves better from a work environment. Pt also reports that she tends to assume people don't like her because she doesn't like herself. Pt identified things to help her take care of herself and will try them this week. Pt was able to work on reframing and made connections between how she sees herself and how she was raised.       O:  MSE:     Appearance:   Appropriate Dress, Good Eye Contact, and Tearful/Crying  Appetite normal  Sleep disturbance No  Fatigue Yes  Loss of pleasure No  Impulsive behavior No  Speech    normal rate, normal volume, and well articulated  Mood    Euthymic  Affect    Anxious  Thought Content    worthlessness, excessive guilt, and cognitive distortions  Thought Process    linear, goal directed, and coherent  Associations    logical connections  Insight    Good  Judgment    Intact  Orientation    oriented to person, place, time, and general circumstances  Memory    recent and remote memory intact  Attention/Concentration    intact  Morbid ideation No  Suicide Assessment    no suicidal ideation    A:          Visit Diagnosis:         History from Medical Record:        Diagnosis Date    Abnormal Pap smear of cervix     ADHD (attention deficit hyperactivity disorder)

## 2023-08-11 ENCOUNTER — HOSPITAL ENCOUNTER (OUTPATIENT)
Dept: PSYCHIATRY | Age: 53
Setting detail: THERAPIES SERIES
Discharge: HOME | End: 2023-08-11

## 2023-08-11 NOTE — PROGRESS NOTES
975 [facility] {OM patient visit type:86520} Note in 214 [facility], [address]   8/11/2023  11:00 AM  Mickey Hernandez  1970  [id number]    Time spent with Patient: *** minutes      Pt was provided informed consent for the [address] [place] [address]. Discussed with patient model of service to include the limits of confidentiality (i.e. abuse reporting, suicide intervention, etc.) and short-term intervention focused approach. Pt indicated understanding.     {[facility] Inpatient or Virtual Question:54731}      S:          O:  MSE:     Appearance:   {MSE Appearance:63943}  Appetite {Desc; normal/:81536}  Sleep disturbance {YES / NO:19727}  Fatigue {YES / NO:19727}  Loss of pleasure {YES / NR:44568}  Impulsive behavior {YES / IM:48387}  Speech    {SPEECH:195730246}  Mood    {MSE Mood:75742}  Affect    {MSE Affect:11460}  Thought Content    {EXAM; PSYCH THOUGHT CONTENT FA:28631}  Thought Process    {THOUGHT PROCESS:181199191}  Associations    {Findings; psych thought process exam:01938::\"logical connections\"}  Insight    {Assessment:9720482097}  Judgment    {FINDINGS; OT UE INTACT/IMPAIRED:5296748330}  Orientation    {Exam; orientation:50233}  Memory    {EXAM; NEURO PED MXIVWD:77745}  Attention/Concentration    {Desc; intact/impaired:89071}  Morbid ideation {YES / WB:73823}  Suicide Assessment    {SUICIDE:735605191}    A:          Visit Diagnosis:         History from Medical Record:        Diagnosis Date    Abnormal Pap smear of cervix     ADHD (attention deficit hyperactivity disorder)     Anxiety     Depression     Fibromyalgia     Hyperlipidemia     Obesity     Unspecified sleep apnea     UTI (urinary tract infection)      Medications:   Current Outpatient Medications   Medication Sig Dispense Refill    DODEX 1000 MCG/ML injection INJECT 1ML INTRAMUSCULARLY EVERY 30 DAYS 1 mL 2    sertraline (ZOLOFT) 100 MG tablet TAKE 1 AND 1/2 TABLETS BY MOUTH DAILY 135 tablet 1    pantoprazole (PROTONIX) 40 MG tablet take

## 2023-08-18 ENCOUNTER — HOSPITAL ENCOUNTER (OUTPATIENT)
Dept: PSYCHIATRY | Age: 53
Setting detail: THERAPIES SERIES
Discharge: HOME | End: 2023-08-18

## 2023-08-18 ENCOUNTER — HOSPITAL ENCOUNTER (OUTPATIENT)
Dept: WOMENS IMAGING | Age: 53
Discharge: HOME | End: 2023-08-18
Payer: COMMERCIAL

## 2023-08-18 VITALS — WEIGHT: 190 LBS | HEIGHT: 69 IN | BODY MASS INDEX: 28.14 KG/M2

## 2023-08-18 DIAGNOSIS — Z12.31 ENCOUNTER FOR SCREENING MAMMOGRAM FOR MALIGNANT NEOPLASM OF BREAST: ICD-10-CM

## 2023-08-18 PROCEDURE — 77063 BREAST TOMOSYNTHESIS BI: CPT
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
## 2023-08-25 ENCOUNTER — HOSPITAL ENCOUNTER (OUTPATIENT)
Dept: PSYCHIATRY | Age: 53
Setting detail: THERAPIES SERIES
Discharge: HOME | End: 2023-08-25

## 2023-08-25 NOTE — PROGRESS NOTES
[facility] {OM patient visit type:17391} Note in 214 [facility], [facility]   8/25/2023  11:00 AM  Roselyn Marquez  1970  [id number]    Time spent with Patient: *** minutes      Pt was provided informed consent for the [address] [place] [address]. Discussed with patient model of service to include the limits of confidentiality (i.e. abuse reporting, suicide intervention, etc.) and short-term intervention focused approach. Pt indicated understanding.     {[facility] Inpatient or Virtual Question:95839}      S:          O:  MSE:     Appearance:   {MSE Appearance:43061}  Appetite {Desc; normal/:78117}  Sleep disturbance {YES / NO:19727}  Fatigue {YES / NO:19727}  Loss of pleasure {YES / PZ:91515}  Impulsive behavior {YES / GT:20115}  Speech    {SPEECH:595339961}  Mood    {MSE Mood:84262}  Affect    {MSE Affect:63488}  Thought Content    {EXAM; PSYCH THOUGHT CONTENT FA:96982}  Thought Process    {THOUGHT PROCESS:670778203}  Associations    {Findings; psych thought process exam:39190::\"logical connections\"}  Insight    {Assessment:4739907293}  Judgment    {FINDINGS; OT UE INTACT/IMPAIRED:3318660020}  Orientation    {Exam; orientation:64187}  Memory    {EXAM; NEURO PED IJCNBX:48600}  Attention/Concentration    {Desc; intact/impaired:04369}  Morbid ideation {YES / FQ:67859}  Suicide Assessment    {SUICIDE:685981764}    A:          Visit Diagnosis:         History from Medical Record:        Diagnosis Date    Abnormal Pap smear of cervix     ADHD (attention deficit hyperactivity disorder)     Anxiety     Depression     Fibromyalgia     Hyperlipidemia     Obesity     Unspecified sleep apnea     UTI (urinary tract infection)      Medications:   Current Outpatient Medications   Medication Sig Dispense Refill    DODEX 1000 MCG/ML injection INJECT 1ML INTRAMUSCULARLY EVERY 30 DAYS 1 mL 2    sertraline (ZOLOFT) 100 MG tablet TAKE 1 AND 1/2 TABLETS BY MOUTH DAILY 135 tablet 1    pantoprazole (PROTONIX) 40 MG tablet take

## 2023-09-08 ENCOUNTER — HOSPITAL ENCOUNTER (OUTPATIENT)
Dept: PSYCHIATRY | Age: 53
Setting detail: THERAPIES SERIES
Discharge: HOME | End: 2023-09-08

## 2023-09-08 NOTE — PROGRESS NOTES
[facility] Note in 214 [facility], [facility]   9/8/2023  11:00 AM  Karoline Abdul  1970  [id number]    Time spent with Patient: 60 minutes      Pt was provided informed consent for the [address]. Discussed with patient model of service to include the limits of confidentiality (i.e. abuse reporting, suicide intervention, etc.) and short-term intervention focused approach. Pt indicated understanding. This was not a virtual session      S:  Pt reports that she has been feeling a it \"off\" lately. Pt completed updated scales which revealed in increase in symptoms of trauma since last screen, but it is still a decrease since intake. Pt reports that she is \"going through it\" because she is going through her house an working on finding a new job so it is bringing up more emotions. Pt provided insight into her goals and identified things she still wanted to work on. Pt reports she would like to continue services at the [facility]. Pt has achieved some of her goals and her tx plan will be updated. In next session pt will engage in medication/contemplation about her wants. O:  MSE:     Appearance:   Appropriate Dress, Good Hygiene, Good Eye Contact, and Tearful/Crying  Appetite normal  Sleep disturbance Yes  Fatigue Yes  Loss of pleasure Yes  Impulsive behavior No  Speech    normal volume, well articulated, and pressured  Mood    Sadness / Tearfulness, Worthless, and Low Self Esteem  Affect    Depressed  Thought Content    worthlessness, excessive preoccupations, and cognitive distortions  Thought Process    tangential  Associations    logical connections  Insight    Good  Judgment    Intact  Orientation    oriented to person, place, time, and general circumstances  Memory    recent and remote memory intact  Attention/Concentration    intact  Morbid ideation No  Suicide Assessment    no suicidal ideation    A:  Pt was engaged in session.   Pt's updated scale scores were PCL5:

## 2023-09-22 ENCOUNTER — HOSPITAL ENCOUNTER (OUTPATIENT)
Dept: PSYCHIATRY | Age: 53
Setting detail: THERAPIES SERIES
Discharge: HOME | End: 2023-09-22

## 2023-09-22 NOTE — PROGRESS NOTES
Technique  [x] Imagery/Relaxation Training   [x] Narrative Therapy  [x] Pattern Identification and Interruption   [x] Problem Solving Skills Training   [x] Self/Other Boundaries Training   [x] Self-Compassion Training  [x] Solution Focused Techniques   [x] Stress Management   [x] Supportive Therapy  [x] Family Session       [x] Assign Readings  [x] Assign Tasks  [x] Educate Regarding: Trauma, self-talk  [x] Explore/Monitor: Negative thoughts  [x] Teach Skills of: coping skills, cognitive skills  [x] Use of Resources/Strengths:     Estimated Time Frames for Goals:    [x] 20 sessions:      My therapist and I have developed this plan together, and I am in agreement to working on these issues and goals. I understand the plan that has been developed for my treatment.      Signatures if providing Client with printed copy:    Patient Signature      Date      Therapist Signature      Date

## 2023-10-13 ENCOUNTER — HOSPITAL ENCOUNTER (OUTPATIENT)
Dept: PSYCHIATRY | Age: 53
Setting detail: THERAPIES SERIES
Discharge: HOME | End: 2023-10-13
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
## 2023-10-20 ENCOUNTER — HOSPITAL ENCOUNTER (OUTPATIENT)
Dept: PSYCHIATRY | Age: 53
Setting detail: THERAPIES SERIES
Discharge: HOME | End: 2023-10-20

## 2023-10-20 NOTE — PROGRESS NOTES
tablet 0    busPIRone (BUSPAR) 7.5 MG tablet take 1 tablet by mouth twice a day if needed for anxiety 60 tablet 0    hydrOXYzine (ATARAX) 10 MG tablet Take 1 tablet by mouth 3 times daily as needed for Anxiety 90 tablet 2    topiramate (TOPAMAX) 100 MG tablet Take 1 tablet by mouth 2 times daily 60 tablet 3    diclofenac sodium (VOLTAREN) 1 % GEL apply 4 grams topically four times a day AS NEEDED FOR PAIN 500 g 5    amphetamine-dextroamphetamine (ADDERALL) 15 MG tablet take 1 tablet by mouth twice a day 60 tablet 0    fluticasone (FLONASE) 50 MCG/ACT nasal spray 1 spray by Nasal route daily 16 g 5    Pantoprazole Sodium (PROTONIX PO) Take by mouth      loratadine (CLARITIN) 10 MG tablet take 1 tablet by mouth once daily if needed 30 tablet 5    Mirabegron ER (MYRBETRIQ) 25 MG TB24 Take 1 tablet by mouth daily 14 tablet 0    IRON PO       Vitamin D (CHOLECALCIFEROL) 1000 UNITS CAPS capsule Take 1 capsule by mouth daily      CALCIUM CITRATE PO Take 1,500 mg by mouth daily. acetaminophen (TYLENOL) 325 MG tablet Take 2 tablets by mouth every 6 hours as needed       No current facility-administered medications for this encounter.        Social History:   Social History     Socioeconomic History    Marital status:      Spouse name: Not on file    Number of children: Not on file    Years of education: Not on file    Highest education level: Not on file   Occupational History    Not on file   Tobacco Use    Smoking status: Former     Packs/day: 0.00     Years: 1.00     Additional pack years: 0.00     Total pack years: 0.00     Types: Cigarettes     Quit date: 1994     Years since quittin.1    Smokeless tobacco: Never   Substance and Sexual Activity    Alcohol use: No    Drug use: No    Sexual activity: Not on file   Other Topics Concern    Not on file   Social History Narrative    Not on file     Social Determinants of Health     Financial Resource Strain: Low Risk  (2023)    Overall Financial

## 2023-10-26 ENCOUNTER — HOSPITAL ENCOUNTER (OUTPATIENT)
Dept: PSYCHIATRY | Age: 53
Setting detail: THERAPIES SERIES
Discharge: HOME | End: 2023-10-26

## 2023-10-26 NOTE — PROGRESS NOTES
true   Transportation Needs: Unknown (8/1/2023)    PRAPARE - Transportation     Lack of Transportation (Medical): Not on file     Lack of Transportation (Non-Medical): No   Physical Activity: Not on file   Stress: Not on file   Social Connections: Not on file   Intimate Partner Violence: Not on file   Housing Stability: Unknown (8/1/2023)    Housing Stability Vital Sign     Unable to Pay for Housing in the Last Year: Not on file     Number of Places Lived in the Last Year: Not on file     Unstable Housing in the Last Year: No       TOBACCO:   reports that she quit smoking about 29 years ago. Her smoking use included cigarettes. She has never used smokeless tobacco.  ETOH:   reports no history of alcohol use. Family History:   Family History   Problem Relation Age of Onset    Diabetes Mother     High Blood Pressure Mother     Heart Disease Mother     Heart Disease Father     Cancer Father         prostate    Cancer Paternal Grandmother         breast cancer at age 80           Pt interventions:  Emotional Support, Cognitive Behavioral Therapy, Eye Movement Desensitization and Reprocessing, Building Rapport, Supportive Techniques / Emotional Support, and Active Listening      PLAN:   Pt and therapist will reassess target at next visit. Therapist and pt will work on addressing pt's old habits when it comes to work: being social but not too invested that she gets hurt feelings, fear of going back to work, boundaries       Patient scheduled to return on:   Friday 11/3/23 at 11 AM.     Were changes or additions made to the treatment plan today?   YES []   NO [x]  Noted changes:

## 2023-11-10 ENCOUNTER — HOSPITAL ENCOUNTER (OUTPATIENT)
Dept: PSYCHIATRY | Age: 53
Setting detail: THERAPIES SERIES
Discharge: HOME | End: 2023-11-10

## 2023-11-10 NOTE — PROGRESS NOTES
9[facility] {OM patient visit type:28991} Note in 214 [facility], [facility]   11/10/2023  11:00 AM  Shahzad Frederick  1970  [id number]    Time spent with Patient: *** minutes      Pt was provided informed consent for the [address] [place] [address]. Discussed with patient model of service to include the limits of confidentiality (i.e. abuse reporting, suicide intervention, etc.) and short-term intervention focused approach. Pt indicated understanding.     {[facility] Inpatient or Virtual Question:93954}      S:          O:  MSE:     Appearance:   {MSE Appearance:62193}  Appetite {Desc; normal/:52502}  Sleep disturbance {YES / NO:19727}  Fatigue {YES / NO:19727}  Loss of pleasure {YES / QE:66442}  Impulsive behavior {YES / TL:84130}  Speech    {SPEECH:759531496}  Mood    {MSE Mood:40800}  Affect    {MSE Affect:84656}  Thought Content    {EXAM; PSYCH THOUGHT CONTENT FA:31539}  Thought Process    {THOUGHT PROCESS:599706406}  Associations    {Findings; psych thought process exam:75366::\"logical connections\"}  Insight    {Assessment:6106541021}  Judgment    {FINDINGS; OT UE INTACT/IMPAIRED:7721211374}  Orientation    {Exam; orientation:87876}  Memory    {EXAM; NEURO PED YMNEKM:81160}  Attention/Concentration    {Desc; intact/impaired:74897}  Morbid ideation {YES / JJ:87825}  Suicide Assessment    {SUICIDE:179153749}    A:          Visit Diagnosis:         History from Medical Record:        Diagnosis Date    Abnormal Pap smear of cervix     ADHD (attention deficit hyperactivity disorder)     Anxiety     Depression     Fibromyalgia     Hyperlipidemia     Obesity     Unspecified sleep apnea     UTI (urinary tract infection)      Medications:   Current Outpatient Medications   Medication Sig Dispense Refill    DODEX 1000 MCG/ML injection INJECT 1ML INTRAMUSCULARLY EVERY 30 DAYS 1 mL 2    sertraline (ZOLOFT) 100 MG tablet TAKE 1 AND 1/2 TABLETS BY MOUTH DAILY 135 tablet 1    pantoprazole (PROTONIX) 40 MG tablet take

## 2023-11-17 ENCOUNTER — HOSPITAL ENCOUNTER (OUTPATIENT)
Dept: PSYCHIATRY | Age: 53
Setting detail: THERAPIES SERIES
Discharge: HOME | End: 2023-11-17

## 2023-11-17 NOTE — PROGRESS NOTES
[facility] Therapy Note in 214 [facility], [address] [place] [address]   11/17/2023  11:00 AM  Steph Villalobos  1970  [id number]    Time spent with Patient: 60 minutes      Pt was provided informed consent for the [facility]. Discussed with patient model of service to include the limits of confidentiality (i.e. abuse reporting, suicide intervention, etc.) and short-term intervention focused approach. Pt indicated understanding.     {TRC Inpatient or Virtual Question:27934}      S:          O:  MSE:     Appearance:   {MSE Appearance:47892}  Appetite {Desc; normal/:90362}  Sleep disturbance {YES / NO:19727}  Fatigue {YES / NO:19727}  Loss of pleasure {YES / FK:88091}  Impulsive behavior {YES / GQ:86049}  Speech    {SPEECH:408275299}  Mood    {MSE Mood:67677}  Affect    {MSE Affect:66570}  Thought Content    {EXAM; PSYCH THOUGHT CONTENT FA:91498}  Thought Process    {THOUGHT PROCESS:895693439}  Associations    {Findings; psych thought process exam:43698::\"logical connections\"}  Insight    {Assessment:0024402286}  Judgment    {FINDINGS; OT UE INTACT/IMPAIRED:0995369079}  Orientation    {Exam; orientation:56499}  Memory    {EXAM; NEURO PED GWSRDF:07830}  Attention/Concentration    {Desc; intact/impaired:19987}  Morbid ideation {YES / NZ:62354}  Suicide Assessment    {SUICIDE:051124132}    A:          Visit Diagnosis:         History from Medical Record:        Diagnosis Date    Abnormal Pap smear of cervix     ADHD (attention deficit hyperactivity disorder)     Anxiety     Depression     Fibromyalgia     Hyperlipidemia     Obesity     Unspecified sleep apnea     UTI (urinary tract infection)      Medications:   Current Outpatient Medications   Medication Sig Dispense Refill    DODEX 1000 MCG/ML injection INJECT 1ML INTRAMUSCULARLY EVERY 30 DAYS 1 mL 2    sertraline (ZOLOFT) 100 MG tablet TAKE 1 AND 1/2 TABLETS BY MOUTH DAILY 135 tablet 1    pantoprazole (PROTONIX) 40 MG tablet take 1 tablet by mouth once

## 2023-12-01 ENCOUNTER — HOSPITAL ENCOUNTER (OUTPATIENT)
Dept: PSYCHIATRY | Age: 53
Setting detail: THERAPIES SERIES
Discharge: HOME | End: 2023-12-01

## 2023-12-01 NOTE — PROGRESS NOTES
[facility] Therapy Note in 214 [facility], [address] [place] [address]   2023  11:00 AM  Porfirio Momin  1970  [id number]    Time spent with Patient: *** minutes      Pt was provided informed consent for the [facility]. Discussed with patient model of service to include the limits of confidentiality (i.e. abuse reporting, suicide intervention, etc.) and short-term intervention focused approach. Pt indicated understanding.     {TR Inpatient or Virtual Question:50186}      S:          O:  MSE:     Appearance:   {MSE Appearance:15045}  Appetite {Desc; normal/:24562}  Sleep disturbance {YES / NO:}  Fatigue {YES / NO:}  Loss of pleasure {YES / IR:55828}  Impulsive behavior {YES / H}  Speech    {SPEECH:113537213}  Mood    {MSE Mood:95650}  Affect    {MSE Affect:26985}  Thought Content    {EXAM; PSYCH THOUGHT CONTENT FA:76248}  Thought Process    {THOUGHT PROCESS:504202991}  Associations    {Findings; psych thought process exam:48999::\"logical connections\"}  Insight    {Assessment:7908192598}  Judgment    {FINDINGS; OT UE INTACT/IMPAIRED:4681381670}  Orientation    {Exam; orientation:95000}  Memory    {EXAM; NEURO PED ZVPXAI:84435}  Attention/Concentration    {Desc; intact/impaired:04374}  Morbid ideation {YES / DK:52750}  Suicide Assessment    {SUICIDE:557956402}    A:          Visit Diagnosis:         History from Medical Record:        Diagnosis Date    Abnormal Pap smear of cervix     ADHD (attention deficit hyperactivity disorder)     Anxiety     Depression     Fibromyalgia     Hyperlipidemia     Obesity     Unspecified sleep apnea     UTI (urinary tract infection)      Medications:   Current Outpatient Medications   Medication Sig Dispense Refill    DODEX 1000 MCG/ML injection INJECT 1ML INTRAMUSCULARLY EVERY 30 DAYS 1 mL 2    sertraline (ZOLOFT) 100 MG tablet TAKE 1 AND 1/2 TABLETS BY MOUTH DAILY 135 tablet 1    pantoprazole (PROTONIX) 40 MG tablet take 1 tablet by mouth once

## 2023-12-15 ENCOUNTER — HOSPITAL ENCOUNTER (OUTPATIENT)
Dept: PSYCHIATRY | Age: 53
Setting detail: THERAPIES SERIES
Discharge: HOME | End: 2023-12-15

## 2023-12-15 NOTE — PROGRESS NOTES
[facility] Therapy Note in 214 [facility], [address] [place] [address]   12/15/2023  11:00 AM  Horald Bones  1970  [id number]    Time spent with Patient: *** minutes      Pt was provided informed consent for the [facility]. Discussed with patient model of service to include the limits of confidentiality (i.e. abuse reporting, suicide intervention, etc.) and short-term intervention focused approach. Pt indicated understanding.     {TRC Inpatient or Virtual Question:90382}      S:          O:  MSE:     Appearance:   {MSE Appearance:53018}  Appetite {Desc; normal/:01957}  Sleep disturbance {YES / NO:19727}  Fatigue {YES / NO:19727}  Loss of pleasure {YES / PZ:12053}  Impulsive behavior {YES / UZ:51525}  Speech    {SPEECH:364090609}  Mood    {MSE Mood:55090}  Affect    {MSE Affect:90535}  Thought Content    {EXAM; PSYCH THOUGHT CONTENT FA:97613}  Thought Process    {THOUGHT PROCESS:528948393}  Associations    {Findings; psych thought process exam:11433::\"logical connections\"}  Insight    {Assessment:4559436103}  Judgment    {FINDINGS; OT UE INTACT/IMPAIRED:2787785965}  Orientation    {Exam; orientation:23936}  Memory    {EXAM; NEURO PED BGVUFF:58586}  Attention/Concentration    {Desc; intact/impaired:53262}  Morbid ideation {YES / OR:22027}  Suicide Assessment    {SUICIDE:999452710}    A:          Visit Diagnosis:         History from Medical Record:        Diagnosis Date    Abnormal Pap smear of cervix     ADHD (attention deficit hyperactivity disorder)     Anxiety     Depression     Fibromyalgia     Hyperlipidemia     Obesity     Unspecified sleep apnea     UTI (urinary tract infection)      Medications:   Current Outpatient Medications   Medication Sig Dispense Refill    DODEX 1000 MCG/ML injection INJECT 1ML INTRAMUSCULARLY EVERY 30 DAYS 1 mL 2    sertraline (ZOLOFT) 100 MG tablet TAKE 1 AND 1/2 TABLETS BY MOUTH DAILY 135 tablet 1    pantoprazole (PROTONIX) 40 MG tablet take 1 tablet by mouth

## 2023-12-29 ENCOUNTER — HOSPITAL ENCOUNTER (OUTPATIENT)
Dept: PSYCHIATRY | Age: 53
Setting detail: THERAPIES SERIES
Discharge: HOME | End: 2023-12-29
Payer: COMMERCIAL

## 2023-12-29 PROCEDURE — 90837 PSYTX W PT 60 MINUTES: CPT | Performed by: SOCIAL WORKER

## 2023-12-29 NOTE — PROGRESS NOTES
NEEDED FOR PAIN 500 g 5    amphetamine-dextroamphetamine (ADDERALL) 15 MG tablet take 1 tablet by mouth twice a day 60 tablet 0    fluticasone (FLONASE) 50 MCG/ACT nasal spray 1 spray by Nasal route daily 16 g 5    Pantoprazole Sodium (PROTONIX PO) Take by mouth      loratadine (CLARITIN) 10 MG tablet take 1 tablet by mouth once daily if needed 30 tablet 5    Mirabegron ER (MYRBETRIQ) 25 MG TB24 Take 1 tablet by mouth daily 14 tablet 0    IRON PO       Vitamin D (CHOLECALCIFEROL) 1000 UNITS CAPS capsule Take 1 capsule by mouth daily      CALCIUM CITRATE PO Take 1,500 mg by mouth daily. acetaminophen (TYLENOL) 325 MG tablet Take 2 tablets by mouth every 6 hours as needed       No current facility-administered medications for this encounter. Social History:   Social History     Socioeconomic History    Marital status:      Spouse name: Not on file    Number of children: Not on file    Years of education: Not on file    Highest education level: Not on file   Occupational History    Not on file   Tobacco Use    Smoking status: Former     Current packs/day: 0.00     Types: Cigarettes     Quit date: 1993     Years since quittin.3    Smokeless tobacco: Never   Substance and Sexual Activity    Alcohol use: No    Drug use: No    Sexual activity: Not on file   Other Topics Concern    Not on file   Social History Narrative    Not on file     Social Determinants of Health     Financial Resource Strain: Low Risk  (2023)    Overall Financial Resource Strain (CARDIA)     Difficulty of Paying Living Expenses: Not hard at all   Food Insecurity: Not on file (2023)   Transportation Needs: Unknown (2023)    PRAPARE - Transportation     Lack of Transportation (Medical): Not on file     Lack of Transportation (Non-Medical):  No   Physical Activity: Not on file   Stress: Not on file   Social Connections: Not on file   Intimate Partner Violence: Not on file   Housing Stability: Unknown (2023)

## 2024-01-12 ENCOUNTER — HOSPITAL ENCOUNTER (OUTPATIENT)
Dept: PSYCHIATRY | Age: 54
Setting detail: THERAPIES SERIES
Discharge: HOME | End: 2024-01-12

## 2024-02-01 ENCOUNTER — HOSPITAL ENCOUNTER (OUTPATIENT)
Dept: PSYCHIATRY | Age: 54
Setting detail: THERAPIES SERIES
Discharge: HOME | End: 2024-02-01
Payer: COMMERCIAL

## 2024-02-01 PROCEDURE — 90837 PSYTX W PT 60 MINUTES: CPT | Performed by: SOCIAL WORKER
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
## 2024-02-14 ENCOUNTER — HOSPITAL ENCOUNTER (OUTPATIENT)
Dept: PSYCHIATRY | Age: 54
Setting detail: THERAPIES SERIES
Discharge: HOME | End: 2024-02-14

## 2024-02-14 NOTE — PROGRESS NOTES
[facility] Therapy Note in Person  FRANCA Soriano   2/14/2024    Tejal ALEXA Tenzin  1970  [id number]    Time spent with Patient: 60 minutes      Pt was provided informed consent for the [facility]. Discussed with patient model of service to include the limits of confidentiality (i.e. abuse reporting, suicide intervention, etc.) and short-term intervention focused approach.  Pt indicated understanding.    This was not a virtual session      S:  Pt completed updated PCL5.  Pt reports feeling sad and upset about incident at new job.  Pt reports increased thoughts of things being \"her fault\" and worrying about what others think.  Pt also feels lonely.  Pt reported she tried two support groups at [employer] and is attempting not to assume the worst and that she is \"too much.\"  Pt will go back to support groups.  Pt then engaged in EMDR with incident at travel job.    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 Yes   Closure: resources, mindfulness   Client Stability: Good     Other Notes:  It's their loss, I would have been fine   Wouldn't have been worth it to be there   Insight: may need to continue to process mom    O:  MSE:     Appearance:   Appropriate Dress, Good Hygiene, Good Eye Contact, and Tearful/Crying  Appetite abnormal: increased appetite   Sleep disturbance Yes  Fatigue Yes  Loss of pleasure Yes  Impulsive behavior No  Speech    normal rate, normal volume, and pressured  Mood    Sadness / Tearfulness and Low Self Esteem  Affect    Depressed  Thought Content    worthlessness, excessive guilt, cognitive distortions, and all or nothing thinking  Thought Process    linear, goal directed, and coherent  Associations    logical connections  Insight

## 2024-02-28 ENCOUNTER — HOSPITAL ENCOUNTER (OUTPATIENT)
Dept: PSYCHIATRY | Age: 54
Setting detail: THERAPIES SERIES
Discharge: HOME | End: 2024-02-28
Payer: COMMERCIAL

## 2024-02-28 PROCEDURE — 90837 PSYTX W PT 60 MINUTES: CPT | Performed by: SOCIAL WORKER

## 2024-02-28 NOTE — PROGRESS NOTES
[facility] Therapy Note in Person  FRANCA Soriano   2/28/2024  1:00 PM  Tejal Dave  1970  [id number]    Time spent with Patient: 60 minutes      Pt was provided informed consent for the [facility]. Discussed with patient model of service to include the limits of confidentiality (i.e. abuse reporting, suicide intervention, etc.) and short-term intervention focused approach.  Pt indicated understanding.    This was not a virtual session      S:  Pt gave update on two groups she has been attending at [facility].  Pt reported things that were going well and times when she had difficult thoughts about herself (including when she convinced herself she \"broke the rules\") and reported she managed those difficult thoughts well.  Pt and therapist explored pt's needs for session.  Pt expressed a need to externally process thoughts.  Pt and therapist discussed past experiences and relationships and therapist offered psychoeducation on trauma.  Pt and therapist discussed the safety measures that \"little cristian\" used and letting some of those go.  Through processing pt identified that silence is unsafe and anger is dangerous. 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   Pt also reports she sometimes \"wakes up thinking of things.\"  Pt to utilize journal by bedside to track these thoughts.  Pt to also use thought log to track negative thinking patterns to start reframing them.      O:  MSE:     Appearance:   Appropriate Dress, Good Eye Contact, and Tearful/Crying  Appetite normal  Sleep disturbance No  Fatigue Yes  Loss of pleasure No  Impulsive behavior No  Speech    normal rate, normal volume, and well articulated  Mood    Euthymic, \"Okay\"  Affect    Anxious  Thought Content    cognitive distortions and all or nothing thinking  Thought Process    linear, goal directed, and coherent  Associations    logical

## 2024-03-13 ENCOUNTER — HOSPITAL ENCOUNTER (OUTPATIENT)
Dept: PSYCHIATRY | Age: 54
Discharge: HOME | End: 2024-03-13

## 2024-03-13 NOTE — PROGRESS NOTES
[facility]  Note  FRANCA Soriano   3/13/2024    Tejal Dave  1970  [id number]    Today's 2 PM appt cancelled due to provider illness.  Rescheduled for Monday 3/18/24 at 11 AM.

## 2024-03-18 ENCOUNTER — HOSPITAL ENCOUNTER (OUTPATIENT)
Dept: PSYCHIATRY | Age: 54
Setting detail: THERAPIES SERIES
Discharge: HOME | End: 2024-03-18
Payer: COMMERCIAL

## 2024-03-18 PROCEDURE — 90837 PSYTX W PT 60 MINUTES: CPT | Performed by: SOCIAL WORKER

## 2024-03-18 NOTE — PROGRESS NOTES
injection INJECT 1ML INTRAMUSCULARLY EVERY 30 DAYS 1 mL 2    busPIRone (BUSPAR) 7.5 MG tablet take 1 tablet by mouth twice a day if needed for anxiety 60 tablet 0    hydrOXYzine (ATARAX) 10 MG tablet Take 1 tablet by mouth 3 times daily as needed for Anxiety 90 tablet 2    topiramate (TOPAMAX) 100 MG tablet Take 1 tablet by mouth 2 times daily 60 tablet 3    diclofenac sodium (VOLTAREN) 1 % GEL apply 4 grams topically four times a day AS NEEDED FOR PAIN 500 g 5    amphetamine-dextroamphetamine (ADDERALL) 15 MG tablet take 1 tablet by mouth twice a day 60 tablet 0    fluticasone (FLONASE) 50 MCG/ACT nasal spray 1 spray by Nasal route daily 16 g 5    Pantoprazole Sodium (PROTONIX PO) Take by mouth      loratadine (CLARITIN) 10 MG tablet take 1 tablet by mouth once daily if needed 30 tablet 5    Mirabegron ER (MYRBETRIQ) 25 MG TB24 Take 1 tablet by mouth daily 14 tablet 0    IRON PO       Vitamin D (CHOLECALCIFEROL) 1000 UNITS CAPS capsule Take 1 capsule by mouth daily      CALCIUM CITRATE PO Take 1,500 mg by mouth daily.      acetaminophen (TYLENOL) 325 MG tablet Take 2 tablets by mouth every 6 hours as needed       No current facility-administered medications for this encounter.       Social History:   Social History     Socioeconomic History    Marital status:      Spouse name: Not on file    Number of children: Not on file    Years of education: Not on file    Highest education level: Not on file   Occupational History    Not on file   Tobacco Use    Smoking status: Former     Current packs/day: 0.00     Types: Cigarettes     Quit date: 1993     Years since quittin.5    Smokeless tobacco: Never   Substance and Sexual Activity    Alcohol use: No    Drug use: No    Sexual activity: Not on file   Other Topics Concern    Not on file   Social History Narrative    Not on file     Social Determinants of Health     Financial Resource Strain: Low Risk  (2023)    Overall Financial Resource Strain (CARDIA)

## 2024-03-25 ENCOUNTER — HOSPITAL ENCOUNTER (OUTPATIENT)
Dept: PSYCHIATRY | Age: 54
Setting detail: THERAPIES SERIES
Discharge: HOME | End: 2024-03-25
Payer: COMMERCIAL

## 2024-03-25 PROCEDURE — 90837 PSYTX W PT 60 MINUTES: CPT | Performed by: SOCIAL WORKER
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
## 2024-10-07 ENCOUNTER — HOSPITAL ENCOUNTER (OUTPATIENT)
Age: 54
Discharge: HOME | End: 2024-10-07
Payer: COMMERCIAL

## 2024-10-07 DIAGNOSIS — Z13.9 SCREENING FOR CONDITION: ICD-10-CM

## 2024-10-07 LAB
25(OH)D3 SERPL-MCNC: 37.2 NG/ML (ref 30–100)
ALBUMIN SERPL-MCNC: 4.5 G/DL (ref 3.5–5.2)
ALP SERPL-CCNC: 73 U/L (ref 35–104)
ALT SERPL-CCNC: 9 U/L (ref 5–33)
ANION GAP SERPL CALCULATED.3IONS-SCNC: 8 MMOL/L (ref 9–17)
AST SERPL-CCNC: 15 U/L
BILIRUB SERPL-MCNC: 0.3 MG/DL (ref 0.3–1.2)
BUN SERPL-MCNC: 12 MG/DL (ref 6–20)
CALCIUM SERPL-MCNC: 9.6 MG/DL (ref 8.6–10.4)
CHLORIDE SERPL-SCNC: 109 MMOL/L (ref 98–107)
CHOLEST SERPL-MCNC: 234 MG/DL
CHOLESTEROL/HDL RATIO: 4.3
CO2 SERPL-SCNC: 27 MMOL/L (ref 20–31)
CREAT SERPL-MCNC: 0.8 MG/DL (ref 0.5–0.9)
ERYTHROCYTE [DISTWIDTH] IN BLOOD BY AUTOMATED COUNT: 14.8 % (ref 11.5–14.9)
ERYTHROCYTE [SEDIMENTATION RATE] IN BLOOD BY PHOTOMETRIC METHOD: 5 MM/HR (ref 0–30)
EST. AVERAGE GLUCOSE BLD GHB EST-MCNC: 108 MG/DL
GFR, ESTIMATED: 88 ML/MIN/1.73M2
GLUCOSE SERPL-MCNC: 96 MG/DL (ref 70–99)
HBA1C MFR BLD: 5.4 % (ref 4–6)
HCT VFR BLD AUTO: 37.2 % (ref 36–46)
HDLC SERPL-MCNC: 54 MG/DL
HGB BLD-MCNC: 12.5 G/DL (ref 12–16)
IRON SERPL-MCNC: 38 UG/DL (ref 37–145)
LDLC SERPL CALC-MCNC: 158 MG/DL (ref 0–130)
MAGNESIUM SERPL-MCNC: 2.2 MG/DL (ref 1.6–2.6)
MCH RBC QN AUTO: 28 PG (ref 26–34)
MCHC RBC AUTO-ENTMCNC: 33.6 G/DL (ref 31–37)
MCV RBC AUTO: 83.4 FL (ref 80–100)
PLATELET # BLD AUTO: 255 K/UL (ref 150–450)
PMV BLD AUTO: 8.4 FL (ref 6–12)
POTASSIUM SERPL-SCNC: 4 MMOL/L (ref 3.7–5.3)
PROT SERPL-MCNC: 7.8 G/DL (ref 6.4–8.3)
RBC # BLD AUTO: 4.46 M/UL (ref 4–5.2)
SODIUM SERPL-SCNC: 144 MMOL/L (ref 135–144)
T4 FREE SERPL-MCNC: 1.1 NG/DL (ref 0.9–1.7)
TRIGL SERPL-MCNC: 110 MG/DL
TSH SERPL DL<=0.05 MIU/L-ACNC: 2.14 UIU/ML (ref 0.3–5)
VIT B12 SERPL-MCNC: 1024 PG/ML (ref 232–1245)
WBC OTHER # BLD: 5.1 K/UL (ref 3.5–11)

## 2024-10-07 PROCEDURE — 85652 RBC SED RATE AUTOMATED: CPT

## 2024-10-07 PROCEDURE — 85027 COMPLETE CBC AUTOMATED: CPT

## 2024-10-07 PROCEDURE — 36415 COLL VENOUS BLD VENIPUNCTURE: CPT

## 2024-10-07 PROCEDURE — 80061 LIPID PANEL: CPT

## 2024-10-07 PROCEDURE — 84443 ASSAY THYROID STIM HORMONE: CPT

## 2024-10-07 PROCEDURE — 83540 ASSAY OF IRON: CPT

## 2024-10-07 PROCEDURE — 82306 VITAMIN D 25 HYDROXY: CPT

## 2024-10-07 PROCEDURE — 82607 VITAMIN B-12: CPT

## 2024-10-07 PROCEDURE — 84439 ASSAY OF FREE THYROXINE: CPT

## 2024-10-07 PROCEDURE — 83036 HEMOGLOBIN GLYCOSYLATED A1C: CPT

## 2024-10-07 PROCEDURE — 80053 COMPREHEN METABOLIC PANEL: CPT

## 2024-10-07 PROCEDURE — 83735 ASSAY OF MAGNESIUM: CPT

## 2024-10-11 ENCOUNTER — HOSPITAL ENCOUNTER (OUTPATIENT)
Dept: WOMENS IMAGING | Age: 54
Discharge: HOME | End: 2024-10-13
Payer: COMMERCIAL

## 2024-10-11 DIAGNOSIS — Z12.31 ENCOUNTER FOR SCREENING MAMMOGRAM FOR MALIGNANT NEOPLASM OF BREAST: ICD-10-CM

## 2024-10-11 PROCEDURE — 77063 BREAST TOMOSYNTHESIS BI: CPT

## 2025-01-07 ENCOUNTER — HOSPITAL ENCOUNTER (OUTPATIENT)
Age: 55
Discharge: HOME | End: 2025-01-07
Payer: COMMERCIAL

## 2025-01-07 DIAGNOSIS — J30.9 ALLERGIC RHINITIS, UNSPECIFIED SEASONALITY, UNSPECIFIED TRIGGER: ICD-10-CM

## 2025-01-07 PROCEDURE — 82785 ASSAY OF IGE: CPT

## 2025-01-07 PROCEDURE — 36415 COLL VENOUS BLD VENIPUNCTURE: CPT

## 2025-01-07 PROCEDURE — 86003 ALLG SPEC IGE CRUDE XTRC EA: CPT

## 2025-01-08 LAB
A ALTERNATA IGE QN: <0.1 KU/L (ref 0–0.34)
A FUMIGATUS IGE QN: <0.1 KU/L (ref 0–0.34)
ALLERGEN BIRCH IGE: <0.1 KU/L (ref 0–0.34)
BERMUDA GRASS IGE QN: <0.1 KU/L (ref 0–0.34)
BOXELDER IGE QN: <0.1 KU/L (ref 0–0.34)
C HERBARUM IGE QN: <0.1 KUL/L (ref 0–0.34)
CALIF WALNUT POLN IGE QN: <0.1 KU/L (ref 0–0.34)
CAT DANDER IGE QN: <0.1 KU/L (ref 0–0.34)
CMN PIGWEED IGE QN: <0.1 KU/L (ref 0–0.34)
COMMON RAGWEED IGE QN: <0.1 KU/L (ref 0–0.34)
COTTONWOOD IGE QN: <0.1 KU/L (ref 0–0.34)
D FARINAE IGE QN: <0.1 KU/L (ref 0–0.34)
D PTERONYSS IGE QN: <0.1 KU/L (ref 0–0.34)
DOG DANDER IGE QN: <0.1 KU/L (ref 0–0.34)
IGE SERPL-ACNC: 2 IU/ML (ref 0–100)
LONDON PLANE IGE QN: <0.1 KU/L (ref 0–0.34)
M RACEMOSUS IGE QN: <0.1 KU/L (ref 0–0.34)
MOUSE EPITH IGE QN: <0.1 KU/L (ref 0–0.34)
MT JUNIPER IGE QN: <0.1 KU/L (ref 0–0.34)
P NOTATUM IGE QN: <0.1 KU/L (ref 0–0.34)
PECAN/HICK TREE IGE QN: <0.1 KU/L (ref 0–0.34)
ROACH IGE QN: <0.1 KU/L (ref 0–0.34)
SALTWORT IGE QN: <0.1 KU/L (ref 0–0.34)
SHEEP SORREL IGE QN: <0.1 KU/L (ref 0–0.34)
TIMOTHY IGE QN: <0.1 KU/L (ref 0–0.34)
WHITE ASH IGE QN: <0.1 KU/L (ref 0–0.34)
WHITE ELM IGE QN: <0.1 KU/L (ref 0–0.34)
WHITE MULBERRY IGE QN: <0.1 KU/L (ref 0–0.34)
WHITE OAK IGE QN: <0.1 KU/L (ref 0–0.34)

## 2025-01-10 ENCOUNTER — TELEPHONE (OUTPATIENT)
Dept: OTOLARYNGOLOGY | Age: 55
End: 2025-01-10

## 2025-01-10 NOTE — TELEPHONE ENCOUNTER
I phoned Ms. Dave regarding the results of her allergy testing which is otherwise negative.  Dr. Adams continues to recommend conservative medical management of chronic rhinitis, specifically, nasal saline irrigations, topical steroid sprays, plus or minus daily oral antihistamine.  She should continue close follow-up with GI, to review her pathology results.  She may return to clinic as needed.  Ms. Dave states understanding of information and denies further needs.